# Patient Record
Sex: FEMALE | Race: WHITE | NOT HISPANIC OR LATINO | Employment: OTHER
[De-identification: names, ages, dates, MRNs, and addresses within clinical notes are randomized per-mention and may not be internally consistent; named-entity substitution may affect disease eponyms.]

---

## 2017-01-15 ENCOUNTER — HOSPITAL (OUTPATIENT)
Dept: OTHER | Age: 72
End: 2017-01-15
Attending: EMERGENCY MEDICINE

## 2017-01-19 ENCOUNTER — APPOINTMENT (OUTPATIENT)
Dept: CT IMAGING | Facility: HOSPITAL | Age: 72
DRG: 190 | End: 2017-01-19
Attending: EMERGENCY MEDICINE
Payer: MEDICARE

## 2017-01-19 ENCOUNTER — APPOINTMENT (OUTPATIENT)
Dept: GENERAL RADIOLOGY | Facility: HOSPITAL | Age: 72
DRG: 190 | End: 2017-01-19
Attending: EMERGENCY MEDICINE
Payer: MEDICARE

## 2017-01-19 ENCOUNTER — HOSPITAL ENCOUNTER (INPATIENT)
Facility: HOSPITAL | Age: 72
LOS: 2 days | Discharge: HOME | DRG: 190 | End: 2017-01-21
Attending: EMERGENCY MEDICINE | Admitting: HOSPITALIST
Payer: MEDICARE

## 2017-01-19 DIAGNOSIS — J96.01 ACUTE RESPIRATORY FAILURE WITH HYPOXIA (HCC): ICD-10-CM

## 2017-01-19 DIAGNOSIS — J44.0 ACUTE BRONCHITIS WITH COPD (HCC): Primary | ICD-10-CM

## 2017-01-19 DIAGNOSIS — J20.9 ACUTE BRONCHITIS WITH COPD (HCC): Primary | ICD-10-CM

## 2017-01-19 LAB
ALBUMIN SERPL BCP-MCNC: 3.8 G/DL (ref 3.5–4.8)
ALP SERPL-CCNC: 72 U/L (ref 32–100)
ALT SERPL-CCNC: 34 U/L (ref 14–54)
ANION GAP SERPL CALC-SCNC: 12 MMOL/L (ref 0–18)
AST SERPL-CCNC: 40 U/L (ref 15–41)
BACTERIA UR QL AUTO: NEGATIVE /HPF
BASOPHILS # BLD: 0 K/UL (ref 0–0.2)
BASOPHILS NFR BLD: 0 %
BILIRUB DIRECT SERPL-MCNC: 0.1 MG/DL (ref 0–0.2)
BILIRUB SERPL-MCNC: 0.7 MG/DL (ref 0.3–1.2)
BILIRUB UR QL: NEGATIVE
BNP SERPL-MCNC: 36 PG/ML (ref 0–100)
BUN SERPL-MCNC: 17 MG/DL (ref 8–20)
BUN/CREAT SERPL: 20.7 (ref 10–20)
CALCIUM SERPL-MCNC: 9.3 MG/DL (ref 8.5–10.5)
CHLORIDE SERPL-SCNC: 96 MMOL/L (ref 95–110)
CO2 SERPL-SCNC: 28 MMOL/L (ref 22–32)
COLOR UR: YELLOW
CREAT SERPL-MCNC: 0.82 MG/DL (ref 0.5–1.5)
D DIMER PPP FEU-MCNC: 0.76 MCG/ML
EOSINOPHIL # BLD: 0.1 K/UL (ref 0–0.7)
EOSINOPHIL NFR BLD: 1 %
ERYTHROCYTE [DISTWIDTH] IN BLOOD BY AUTOMATED COUNT: 12 % (ref 11–15)
FLUAV + FLUBV RNA SPEC NAA+PROBE: NEGATIVE
GLUCOSE SERPL-MCNC: 130 MG/DL (ref 70–99)
GLUCOSE UR-MCNC: NEGATIVE MG/DL
HCT VFR BLD AUTO: 45 % (ref 35–48)
HGB BLD-MCNC: 14.9 G/DL (ref 12–16)
INR BLD: 1 (ref 0.9–1.2)
KETONES UR-MCNC: NEGATIVE MG/DL
LACTATE SERPL-SCNC: 1.8 MMOL/L (ref 0.5–2.2)
LYMPHOCYTES # BLD: 2.5 K/UL (ref 1–4)
LYMPHOCYTES NFR BLD: 22 %
MAGNESIUM SERPL-MCNC: 2 MG/DL (ref 1.8–2.5)
MCH RBC QN AUTO: 32.9 PG (ref 27–32)
MCHC RBC AUTO-ENTMCNC: 33.2 G/DL (ref 32–37)
MCV RBC AUTO: 99.1 FL (ref 80–100)
MONOCYTES # BLD: 0.9 K/UL (ref 0–1)
MONOCYTES NFR BLD: 7 %
NEUTROPHILS # BLD AUTO: 8.3 K/UL (ref 1.8–7.7)
NEUTROPHILS NFR BLD: 70 %
NITRITE UR QL STRIP.AUTO: NEGATIVE
OSMOLALITY UR CALC.SUM OF ELEC: 285 MOSM/KG (ref 275–295)
PH UR: 5 [PH] (ref 5–8)
PLATELET # BLD AUTO: 240 K/UL (ref 140–400)
PMV BLD AUTO: 8.5 FL (ref 7.4–10.3)
POTASSIUM SERPL-SCNC: 3.7 MMOL/L (ref 3.3–5.1)
PROT SERPL-MCNC: 7 G/DL (ref 5.9–8.4)
PROT UR-MCNC: NEGATIVE MG/DL
PROTHROMBIN TIME: 12.5 SECONDS (ref 11.8–14.5)
RBC # BLD AUTO: 4.54 M/UL (ref 3.7–5.4)
RBC #/AREA URNS AUTO: 35 /HPF
SODIUM SERPL-SCNC: 136 MMOL/L (ref 136–144)
SP GR UR STRIP: 1.02 (ref 1–1.03)
TROPONIN I SERPL-MCNC: 0 NG/ML (ref ?–0.03)
UROBILINOGEN UR STRIP-ACNC: <2
VIT C UR-MCNC: NEGATIVE MG/DL
WBC # BLD AUTO: 11.7 K/UL (ref 4–11)
WBC #/AREA URNS AUTO: 55 /HPF

## 2017-01-19 PROCEDURE — 71260 CT THORAX DX C+: CPT

## 2017-01-19 PROCEDURE — 99222 1ST HOSP IP/OBS MODERATE 55: CPT | Performed by: INTERNAL MEDICINE

## 2017-01-19 PROCEDURE — 71020 XR CHEST PA + LAT CHEST (CPT=71020): CPT

## 2017-01-19 PROCEDURE — 99223 1ST HOSP IP/OBS HIGH 75: CPT | Performed by: HOSPITALIST

## 2017-01-19 RX ORDER — METHYLPREDNISOLONE SODIUM SUCCINATE 125 MG/2ML
125 INJECTION, POWDER, LYOPHILIZED, FOR SOLUTION INTRAMUSCULAR; INTRAVENOUS ONCE
Status: DISCONTINUED | OUTPATIENT
Start: 2017-01-19 | End: 2017-01-20

## 2017-01-19 RX ORDER — PREDNISONE 1 MG/1
TABLET ORAL
Status: ON HOLD | COMMUNITY
End: 2017-01-21

## 2017-01-19 RX ORDER — PREDNISONE 20 MG/1
60 TABLET ORAL ONCE
Status: COMPLETED | OUTPATIENT
Start: 2017-01-19 | End: 2017-01-19

## 2017-01-19 RX ORDER — IPRATROPIUM BROMIDE AND ALBUTEROL SULFATE 2.5; .5 MG/3ML; MG/3ML
3 SOLUTION RESPIRATORY (INHALATION) ONCE
Status: COMPLETED | OUTPATIENT
Start: 2017-01-19 | End: 2017-01-19

## 2017-01-19 NOTE — ED NOTES
Pt resting on cart. Pt with coarse breath sounds throughout. Pt states she has been coughing x 1 week, but increased in the last 2 days. Fever started this morning. Resp Therapy at bedside. Neb tx in progress.

## 2017-01-19 NOTE — ED NOTES
Pt up to the restroom to void, back to bed without assist. Pt noted to have expiratory wheezing heard without auscultation. Checked patient's room air saturation 86-87%, . Pt placed back on oxygen 2 lpm via NC. Sats now up to 95%.  MD Lesley Burris made a

## 2017-01-19 NOTE — ED NOTES
Pt refused IV solumedrol, sts \"when I take that it makes me sick I do better with oral prednisone\" Dr. Mejia Larsen made aware orders for prednisone received.

## 2017-01-19 NOTE — H&P
[facility]    PATIENT'S NAME: Apple Esparza   ATTENDING PHYSICIAN: Alexa Briggs MD   PATIENT ACCOUNT#:   [de-identified]    LOCATION:  [place] [address]  MEDICAL RECORD #:   [id number]       YOB: 1945  ADMISSION DATE:       01/19/20 wheezing and shortness of breath for the last few days. Symptoms started with cough, generalized body aches, and low-grade fever. Her  had similar symptoms. The patient stated the cough is nonproductive of phlegm. No abdominal pain.   No dysuria

## 2017-01-19 NOTE — ED NOTES
Explained IV CT process to patient. Pt states understanding, denies any history of allergy to contrast dye. IVF infusing at this time.

## 2017-01-19 NOTE — ED PROVIDER NOTES
Patient Seen in: [facility] AND [facility] Emergency Department    History   Patient presents with:  Fever Sepsis (infectious)    Stated Complaint: SOB FEVER    HPI    65-year-old female with past medical history of smoking and cervical radiculopathy and stenosi reviewed. All other systems reviewed and negative except as noted above. PSFH elements reviewed from today and agreed except as otherwise stated in HPI.     Physical Exam       ED Triage Vitals   BP 01/19/17 1331 161/77 mmHg   Pulse 01/19/17 1331 83 following:     Clarity Urine Hazy (*)     Blood Urine Moderate (*)     Leukocyte Esterase Urine Large (*)     WBC Urine 55 (*)     RBC URINE 35 (*)     All other components within normal limits   CBC W/ DIFFERENTIAL - Abnormal; Notable for the following: treatment. Imaging:   Xr Chest Pa + Lat Chest (cpt=71020)    1/19/2017  PROCEDURE: XR CHEST PA + LAT CHEST (CPT=71020)  COMPARISON: None. INDICATIONS: Worsening shortness of breath, cough x2 days. Fever today. TECHNIQUE:   Two views.    FINDINGS: CAR to artifactual degradation. The main pulmonary artery trunk is normal in caliber, measuring 2.5 cm. CARDIAC: The heart is not enlarged. There is no bowing of the interventricular septum to suggest right ventricular strain.  Aortic annular calcifications are MD on 1/19/2017 at 16:36          1/19/2017  CONCLUSION:  1. No evidence of acute pulmonary embolism to the level of the segmental pulmonary artery branches.   2. Scattered peripheral nodular opacities, likely representing small airways disease, possibly wi

## 2017-01-20 PROBLEM — R09.02 HYPOXEMIA: Status: ACTIVE | Noted: 2017-01-20

## 2017-01-20 LAB
ANION GAP SERPL CALC-SCNC: 8 MMOL/L (ref 0–18)
BASOPHILS # BLD: 0 K/UL (ref 0–0.2)
BASOPHILS NFR BLD: 0 %
BUN SERPL-MCNC: 13 MG/DL (ref 8–20)
BUN/CREAT SERPL: 21 (ref 10–20)
CALCIUM SERPL-MCNC: 8.4 MG/DL (ref 8.5–10.5)
CHLORIDE SERPL-SCNC: 98 MMOL/L (ref 95–110)
CO2 SERPL-SCNC: 28 MMOL/L (ref 22–32)
CREAT SERPL-MCNC: 0.62 MG/DL (ref 0.5–1.5)
EOSINOPHIL # BLD: 0 K/UL (ref 0–0.7)
EOSINOPHIL NFR BLD: 0 %
ERYTHROCYTE [DISTWIDTH] IN BLOOD BY AUTOMATED COUNT: 11.9 % (ref 11–15)
GLUCOSE SERPL-MCNC: 82 MG/DL (ref 70–99)
HCT VFR BLD AUTO: 38.1 % (ref 35–48)
HGB BLD-MCNC: 12.9 G/DL (ref 12–16)
LYMPHOCYTES # BLD: 2.2 K/UL (ref 1–4)
LYMPHOCYTES NFR BLD: 19 %
MCH RBC QN AUTO: 33.2 PG (ref 27–32)
MCHC RBC AUTO-ENTMCNC: 34 G/DL (ref 32–37)
MCV RBC AUTO: 97.7 FL (ref 80–100)
MONOCYTES # BLD: 1.1 K/UL (ref 0–1)
MONOCYTES NFR BLD: 9 %
NEUTROPHILS # BLD AUTO: 8.2 K/UL (ref 1.8–7.7)
NEUTROPHILS NFR BLD: 71 %
OSMOLALITY UR CALC.SUM OF ELEC: 277 MOSM/KG (ref 275–295)
PLATELET # BLD AUTO: 213 K/UL (ref 140–400)
PMV BLD AUTO: 8.5 FL (ref 7.4–10.3)
POTASSIUM SERPL-SCNC: 4 MMOL/L (ref 3.3–5.1)
RBC # BLD AUTO: 3.9 M/UL (ref 3.7–5.4)
SODIUM SERPL-SCNC: 134 MMOL/L (ref 136–144)
WBC # BLD AUTO: 11.4 K/UL (ref 4–11)

## 2017-01-20 PROCEDURE — 99233 SBSQ HOSP IP/OBS HIGH 50: CPT | Performed by: HOSPITALIST

## 2017-01-20 PROCEDURE — 99232 SBSQ HOSP IP/OBS MODERATE 35: CPT | Performed by: INTERNAL MEDICINE

## 2017-01-20 RX ORDER — METHYLPREDNISOLONE SODIUM SUCCINATE 40 MG/ML
40 INJECTION, POWDER, LYOPHILIZED, FOR SOLUTION INTRAMUSCULAR; INTRAVENOUS EVERY 6 HOURS
Status: DISCONTINUED | OUTPATIENT
Start: 2017-01-20 | End: 2017-01-20

## 2017-01-20 RX ORDER — IPRATROPIUM BROMIDE AND ALBUTEROL SULFATE 2.5; .5 MG/3ML; MG/3ML
3 SOLUTION RESPIRATORY (INHALATION)
Status: DISCONTINUED | OUTPATIENT
Start: 2017-01-20 | End: 2017-01-21

## 2017-01-20 RX ORDER — NICOTINE 21 MG/24HR
1 PATCH, TRANSDERMAL 24 HOURS TRANSDERMAL DAILY PRN
Status: DISCONTINUED | OUTPATIENT
Start: 2017-01-20 | End: 2017-01-21

## 2017-01-20 RX ORDER — DOCUSATE SODIUM 100 MG/1
100 CAPSULE, LIQUID FILLED ORAL 2 TIMES DAILY
Status: DISCONTINUED | OUTPATIENT
Start: 2017-01-20 | End: 2017-01-21

## 2017-01-20 RX ORDER — ACETAMINOPHEN 325 MG/1
650 TABLET ORAL EVERY 6 HOURS PRN
Status: DISCONTINUED | OUTPATIENT
Start: 2017-01-20 | End: 2017-01-21

## 2017-01-20 RX ORDER — SODIUM CHLORIDE 9 MG/ML
INJECTION, SOLUTION INTRAVENOUS CONTINUOUS
Status: DISCONTINUED | OUTPATIENT
Start: 2017-01-20 | End: 2017-01-20

## 2017-01-20 RX ORDER — PREDNISONE 20 MG/1
60 TABLET ORAL
Status: DISCONTINUED | OUTPATIENT
Start: 2017-01-21 | End: 2017-01-21

## 2017-01-20 RX ORDER — GUAIFENESIN 100 MG/5ML
100 SOLUTION ORAL EVERY 4 HOURS PRN
Status: DISCONTINUED | OUTPATIENT
Start: 2017-01-20 | End: 2017-01-21

## 2017-01-20 RX ORDER — ENOXAPARIN SODIUM 100 MG/ML
40 INJECTION SUBCUTANEOUS DAILY
Status: DISCONTINUED | OUTPATIENT
Start: 2017-01-20 | End: 2017-01-21

## 2017-01-20 RX ORDER — ONDANSETRON 2 MG/ML
4 INJECTION INTRAMUSCULAR; INTRAVENOUS EVERY 6 HOURS PRN
Status: DISCONTINUED | OUTPATIENT
Start: 2017-01-20 | End: 2017-01-21

## 2017-01-20 RX ORDER — SODIUM PHOSPHATE, DIBASIC AND SODIUM PHOSPHATE, MONOBASIC 7; 19 G/133ML; G/133ML
1 ENEMA RECTAL ONCE AS NEEDED
Status: ACTIVE | OUTPATIENT
Start: 2017-01-20 | End: 2017-01-20

## 2017-01-20 RX ORDER — POLYETHYLENE GLYCOL 3350 17 G/17G
17 POWDER, FOR SOLUTION ORAL DAILY PRN
Status: DISCONTINUED | OUTPATIENT
Start: 2017-01-20 | End: 2017-01-21

## 2017-01-20 RX ORDER — IPRATROPIUM BROMIDE AND ALBUTEROL SULFATE 2.5; .5 MG/3ML; MG/3ML
3 SOLUTION RESPIRATORY (INHALATION) EVERY 4 HOURS PRN
Status: DISCONTINUED | OUTPATIENT
Start: 2017-01-20 | End: 2017-01-21

## 2017-01-20 RX ORDER — PREDNISONE 20 MG/1
40 TABLET ORAL
Status: DISCONTINUED | OUTPATIENT
Start: 2017-01-20 | End: 2017-01-20

## 2017-01-20 RX ORDER — PREDNISONE 20 MG/1
40 TABLET ORAL
Status: DISCONTINUED | OUTPATIENT
Start: 2017-01-21 | End: 2017-01-20

## 2017-01-20 RX ORDER — BISACODYL 10 MG
10 SUPPOSITORY, RECTAL RECTAL
Status: DISCONTINUED | OUTPATIENT
Start: 2017-01-20 | End: 2017-01-21

## 2017-01-20 NOTE — PLAN OF CARE
Vss, assessment wnl. Will continue iv abx, ivf. Pt currently refusing iv steriods, but requesting oral steroids. Will continue to monitor pt closely for changes.   Smoking cessation hand out as given,

## 2017-01-20 NOTE — PROGRESS NOTES
[facility]     Progress Note        Algernon Dance Patient Status:  Inpatient    1945 MRN [id number]   Location [facility] 3W/SW Attending Joseph Chand MD   [facility] Day # 1 PCP Luiza Cote MD       Subjective • sodium chloride 100 mL/hr at 01/20/17 8154       Physical Exam  Constitutional: no acute distress, alert, oriented  HEENT: PERRL, EOMI, nares patents, no nasal polyps   Cardio: RRR, S1 S2  Respiratory: bilateral expiratory wheeze  GI: abdomen soft, non material. Automated exposure control for dose reduction was used. Adjustment of the mA and/or kV was done based on the patient's size. Iterative reconstruction technique for dose reduction was employed.  Multiplanar reformats and maximum intensity projectio LIMITED ABDOMEN: Within the parameters of the arterial phase of contrast-enhancement, the included upper abdomen is notable for the presence of a splenule in the left upper quadrant.   BONES: And angulated levoscoliotic curvature of the lumbar spine is inco steroids.  -Nebulizer treatments  -Continue empiric ceftriaxone and azithromycin  -Activity as tolerated.  Assess for home oxygen prior to [facility] [place] in 1-2 days    Curly Riedel, [address] [facility]

## 2017-01-20 NOTE — CONSULTS
[facility] HOSP - [facility]    Report of Consultation    José Miguelrussell Childss Patient Status:  Emergency    1945 MRN [id number]   Location [address] Attending Coy Zavala MD   Hosp Day # 0 PCP Luiza Cote, syncope, weakness, dizziness,   Psychiatric: denies depression, anxiety  Hematologic: denies bruising        Physical Exam:   Blood pressure 137/68, pulse 80, temperature 98.4 °F (36.9 °C), temperature source Temporal, resp.  rate 18, height 4' 11\" (1.499 cardiomegaly. Tortuous aorta. 2. Mild diffuse reticulonodular interstitial pattern with bibasilar predominance.  Suspect mild interstitial pneumonitis         Ct Chest Pain/pe (iv Only) Em    1/19/2017  PROCEDURE: CT OF THE CHEST W CONTRAST PAIN/PE PROTOCOL subsegmental atelectasis bilaterally. No airspace consolidation, pleural effusion, or pneumothorax is detected. AIRWAYS: Mild, diffuse borderline bronchiectasis is suggested. Subtle areas of bronchial wall thickening may reflect bronchitis.  The tracheobro function testing. -CT chest without evidence of acute pulmonary embolism or significant consolidation seen.   -Influenza panel negative.  -Steroids and will wean gradually  -Nebulizer treatments  -Continue empiric ceftriaxone and azithromycin  -Encourage

## 2017-01-21 VITALS
SYSTOLIC BLOOD PRESSURE: 131 MMHG | RESPIRATION RATE: 22 BRPM | OXYGEN SATURATION: 89 % | HEIGHT: 59 IN | DIASTOLIC BLOOD PRESSURE: 61 MMHG | TEMPERATURE: 98 F | BODY MASS INDEX: 22.18 KG/M2 | HEART RATE: 95 BPM | WEIGHT: 110 LBS

## 2017-01-21 LAB
ANION GAP SERPL CALC-SCNC: 8 MMOL/L (ref 0–18)
BUN SERPL-MCNC: 13 MG/DL (ref 8–20)
BUN/CREAT SERPL: 20 (ref 10–20)
CALCIUM SERPL-MCNC: 8.7 MG/DL (ref 8.5–10.5)
CHLORIDE SERPL-SCNC: 100 MMOL/L (ref 95–110)
CO2 SERPL-SCNC: 29 MMOL/L (ref 22–32)
CREAT SERPL-MCNC: 0.65 MG/DL (ref 0.5–1.5)
ERYTHROCYTE [DISTWIDTH] IN BLOOD BY AUTOMATED COUNT: 12.1 % (ref 11–15)
GLUCOSE SERPL-MCNC: 94 MG/DL (ref 70–99)
HCT VFR BLD AUTO: 37.9 % (ref 35–48)
HGB BLD-MCNC: 13 G/DL (ref 12–16)
MCH RBC QN AUTO: 33.4 PG (ref 27–32)
MCHC RBC AUTO-ENTMCNC: 34.2 G/DL (ref 32–37)
MCV RBC AUTO: 97.7 FL (ref 80–100)
OSMOLALITY UR CALC.SUM OF ELEC: 284 MOSM/KG (ref 275–295)
PLATELET # BLD AUTO: 191 K/UL (ref 140–400)
PMV BLD AUTO: 8.5 FL (ref 7.4–10.3)
POTASSIUM SERPL-SCNC: 3.9 MMOL/L (ref 3.3–5.1)
RBC # BLD AUTO: 3.88 M/UL (ref 3.7–5.4)
SODIUM SERPL-SCNC: 137 MMOL/L (ref 136–144)
WBC # BLD AUTO: 10 K/UL (ref 4–11)

## 2017-01-21 PROCEDURE — 99239 HOSP IP/OBS DSCHRG MGMT >30: CPT | Performed by: HOSPITALIST

## 2017-01-21 PROCEDURE — 99232 SBSQ HOSP IP/OBS MODERATE 35: CPT | Performed by: INTERNAL MEDICINE

## 2017-01-21 RX ORDER — LEVOFLOXACIN 500 MG/1
500 TABLET, FILM COATED ORAL DAILY
Qty: 5 TABLET | Refills: 0 | Status: SHIPPED | OUTPATIENT
Start: 2017-01-21 | End: 2017-01-26

## 2017-01-21 RX ORDER — PREDNISONE 10 MG/1
10 TABLET ORAL DAILY
Qty: 45 TABLET | Refills: 0 | Status: ON HOLD | OUTPATIENT
Start: 2017-01-21 | End: 2017-12-25 | Stop reason: ALTCHOICE

## 2017-01-21 RX ORDER — NICOTINE 21 MG/24HR
1 PATCH, TRANSDERMAL 24 HOURS TRANSDERMAL EVERY 24 HOURS
Qty: 30 PATCH | Refills: 0 | Status: SHIPPED | OUTPATIENT
Start: 2017-01-21 | End: 2017-02-20

## 2017-01-21 NOTE — PLAN OF CARE
Patient/Family Goals    • Patient/Family Long Term Goal Progressing    • Patient/Family Short Term Goal Progressing        RESPIRATORY - ADULT    • Achieves optimal ventilation and oxygenation Progressing        Iv antibiotics and po steroids continue.  Oxy

## 2017-01-21 NOTE — PROGRESS NOTES
[facility]     Progress Note        Genna Lopez Patient Status:  Inpatient    1945 MRN [id number]   Location [facility] 3W/SW Attending Claudette Harsh, MD   [address] Day # 2 PCP Luiza Cote MD       Subjective Infusions:        Physical Exam  Constitutional: no acute distress, alert, oriented  HEENT: PERRL, EOMI, nares patents, no nasal polyps   Cardio: RRR, S1 S2  Respiratory: diminished breath sounds  GI: abdomen soft, non tender  Extremities: no clubbing, cya for dose reduction was used. Adjustment of the mA and/or kV was done based on the patient's size. Iterative reconstruction technique for dose reduction was employed. Multiplanar reformats and maximum intensity projection images were created.    FINDINGS: CO the arterial phase of contrast-enhancement, the included upper abdomen is notable for the presence of a splenule in the left upper quadrant. BONES: And angulated levoscoliotic curvature of the lumbar spine is incompletely delineated.  There is right convex azithromycin  -Activity as tolerated. Assess for home oxygen today  -If tolerating ambulation possible discharge later today with tapering course of Prednisone therapy.     Lori Hogan, DO  Pulmonary [address] [facility]

## 2017-01-21 NOTE — PROGRESS NOTES
[facility] HOSPITALIST  Progress Note     Carmelita Massed Patient Status:  Inpatient    1945 MRN [id number]   Location [facility] 3W/SW Attending Adrianna Tiwari MD   [facility] Day # 1 PCP Luiza Cote MD     Chief Complaint: SOB Em    1/19/2017  CONCLUSION:  1. No evidence of acute pulmonary embolism to the level of the segmental pulmonary artery branches. 2. Scattered peripheral nodular opacities, likely representing small airways disease, possibly with an element of bronchitis. clinical documentation in H+P. Based on patients current state of illness, I anticipate that, after discharge, patient will require TBD.

## 2017-01-21 NOTE — DISCHARGE SUMMARY
[place] HOSPITALIST  DISCHARGE SUMMARY     Michi Wilber Campbellmary Patient Status:  Inpatient    1945 MRN [id number]   Location [facility] 3W/SW Attending Balwinder Stevenson MD   Hosp Day # 2 PCP Luiza Cote MD     Date of Admission: 1 for RAD.    - o2 as needed.    - Resp panel negative. CXR and CT chest no consolidation     Probable COPD  - Long standing h/o smoking since age 20 1PPD.    - Needs PFT's outpt in 3-6 weeks.    - Pulm following.    - cont duonebs [id number] Wa.  fLOVENT  - o2 nurse     Bring a paper prescription for each of these medications    - ipratropium-albuterol  MCG/ACT Aero  - levofloxacin 500 MG Tabs  - predniSONE 10 MG Tabs          Discharge Condition: Medically Stable for discharge. Discharge Diet: .  G 1/21/2017    Time spent:  > 35 minutes

## 2017-01-21 NOTE — PLAN OF CARE
Patient ambulated 150ft on room air. Lowest oxygen saturation is 89% and highest o2 saturation is 93%. Patient did not experience shortness of breath with exertion. At rest, o2 saturation is 92% on room air.   Dr. Sly Ortiz made aware of oxygen saturation

## 2017-01-23 ENCOUNTER — TELEPHONE (OUTPATIENT)
Dept: MEDSURG UNIT | Facility: HOSPITAL | Age: 72
End: 2017-01-23

## 2017-01-26 ENCOUNTER — CHARTING TRANS (OUTPATIENT)
Dept: OTHER | Age: 72
End: 2017-01-26

## 2017-02-14 ENCOUNTER — CHARTING TRANS (OUTPATIENT)
Dept: OTHER | Age: 72
End: 2017-02-14

## 2017-12-25 ENCOUNTER — HOSPITAL ENCOUNTER (INPATIENT)
Facility: HOSPITAL | Age: 72
LOS: 2 days | Discharge: HOME | DRG: 392 | End: 2017-12-27
Attending: EMERGENCY MEDICINE | Admitting: INTERNAL MEDICINE
Payer: MEDICARE

## 2017-12-25 ENCOUNTER — APPOINTMENT (OUTPATIENT)
Dept: GENERAL RADIOLOGY | Facility: HOSPITAL | Age: 72
DRG: 392 | End: 2017-12-25
Attending: EMERGENCY MEDICINE
Payer: MEDICARE

## 2017-12-25 DIAGNOSIS — R53.1 WEAKNESS GENERALIZED: Primary | ICD-10-CM

## 2017-12-25 DIAGNOSIS — R13.10 DYSPHAGIA, UNSPECIFIED TYPE: ICD-10-CM

## 2017-12-25 PROCEDURE — 71020 XR CHEST PA + LAT CHEST (CPT=71020): CPT | Performed by: EMERGENCY MEDICINE

## 2017-12-25 PROCEDURE — 80076 HEPATIC FUNCTION PANEL: CPT | Performed by: EMERGENCY MEDICINE

## 2017-12-25 PROCEDURE — 81001 URINALYSIS AUTO W/SCOPE: CPT | Performed by: EMERGENCY MEDICINE

## 2017-12-25 PROCEDURE — 85025 COMPLETE CBC W/AUTO DIFF WBC: CPT | Performed by: EMERGENCY MEDICINE

## 2017-12-25 PROCEDURE — 36415 COLL VENOUS BLD VENIPUNCTURE: CPT

## 2017-12-25 PROCEDURE — 80048 BASIC METABOLIC PNL TOTAL CA: CPT | Performed by: EMERGENCY MEDICINE

## 2017-12-25 PROCEDURE — 99285 EMERGENCY DEPT VISIT HI MDM: CPT

## 2017-12-25 RX ORDER — ENOXAPARIN SODIUM 100 MG/ML
40 INJECTION SUBCUTANEOUS DAILY
Status: DISCONTINUED | OUTPATIENT
Start: 2017-12-25 | End: 2017-12-27

## 2017-12-25 RX ORDER — DEXTROAMPHETAMINE SACCHARATE, AMPHETAMINE ASPARTATE, DEXTROAMPHETAMINE SULFATE AND AMPHETAMINE SULFATE 1.25; 1.25; 1.25; 1.25 MG/1; MG/1; MG/1; MG/1
5 TABLET ORAL 2 TIMES DAILY
COMMUNITY
End: 2018-03-10

## 2017-12-25 RX ORDER — SODIUM CHLORIDE 0.9 % (FLUSH) 0.9 %
3 SYRINGE (ML) INJECTION AS NEEDED
Status: DISCONTINUED | OUTPATIENT
Start: 2017-12-25 | End: 2017-12-27

## 2017-12-25 RX ORDER — DIAZEPAM 5 MG/1
5 TABLET ORAL EVERY 6 HOURS PRN
Status: DISCONTINUED | OUTPATIENT
Start: 2017-12-25 | End: 2017-12-27

## 2017-12-25 RX ORDER — DEXTROAMPHETAMINE SACCHARATE, AMPHETAMINE ASPARTATE, DEXTROAMPHETAMINE SULFATE AND AMPHETAMINE SULFATE 1.25; 1.25; 1.25; 1.25 MG/1; MG/1; MG/1; MG/1
5 TABLET ORAL 2 TIMES DAILY
Status: DISCONTINUED | OUTPATIENT
Start: 2017-12-25 | End: 2017-12-27

## 2017-12-25 RX ORDER — DEXTROSE AND SODIUM CHLORIDE 5; .45 G/100ML; G/100ML
INJECTION, SOLUTION INTRAVENOUS CONTINUOUS
Status: DISCONTINUED | OUTPATIENT
Start: 2017-12-25 | End: 2017-12-27

## 2017-12-25 RX ORDER — HYDROCODONE BITARTRATE AND ACETAMINOPHEN 5; 325 MG/1; MG/1
1 TABLET ORAL EVERY 6 HOURS PRN
COMMUNITY
End: 2018-03-10

## 2017-12-25 RX ORDER — HYDROCODONE BITARTRATE AND ACETAMINOPHEN 5; 325 MG/1; MG/1
1 TABLET ORAL EVERY 6 HOURS PRN
Status: DISCONTINUED | OUTPATIENT
Start: 2017-12-25 | End: 2017-12-27

## 2017-12-25 RX ORDER — DIAZEPAM 5 MG/1
5 TABLET ORAL EVERY 6 HOURS PRN
COMMUNITY

## 2017-12-25 NOTE — ED INITIAL ASSESSMENT (HPI)
Pt brought in by EMS with difficulty swallowing. Pt has been dealing with this issue for the last few months but feels it has worsened over the last week. PT states she is very hungry. No difficulty breathing. Speaking in full sentences.

## 2017-12-25 NOTE — CONSULTS
[facility] HOSP - [facility]    Report of Consultation    Batsheva Seo Patient Status:  Inpatient    1945 MRN [id number]   [address] [place] [address] Drive 5SW/SE Attending Marty Bragg MD   Hosp Day # 0 PCP Luiza Cote MD     Date infusion  Intravenous Continuous       Prescriptions Prior to Admission:  diazepam 5 MG Oral Tab Take 5 mg by mouth every 6 (six) hours as needed for Anxiety. amphetamine-dextroamphetamine (ADDERALL) 5 MG Oral Tab Take 5 mg by mouth 2 (two) times daily. unspecified type  Suspect mechanical due to surgery and possibly subsequent scaring. Rule out stricture, neoplasm, candidiasis.       Recommendations:  EGD will be done  If negative for other than general hypopharyngeal narrowing, advise speech therapy (wit

## 2017-12-25 NOTE — H&P
[address] Patient Status:  Inpatient    1945 MRN [id number]   Location [facility] 5SW/SE Attending Grabiel Mera MD   Hosp Day # 0 PCP Luiza Cote MD     Date:   DISK,ONE LEVEL  No family history on file.   Social History:  Smoking status: Former Smoker                                                              Packs/day: 0.00      Years: 0.00      Smokeless tobacco: Never Used                        Allergies/Med throat is clear, JORGE, EOMI, clear conjunctiva   NECK: No masses, symmetric, no adenopathy   LUNGS: CTAB, no wrr   CV: RRR without murmur   ABD: Soft, nontender and not distended   EXTREMITIES: no cyanosis, clubbing or edema   NEURO: Strength intact; no se

## 2017-12-25 NOTE — ED PROVIDER NOTES
Patient Seen in: [facility] AND [facility] Emergency Department    History   Patient presents with:  Swallowing Problem (gastrointestinal)    Stated Complaint: difficulty swallowing    HPI    71-year-old female patient presents complaining of generalized weaknes Normal respiratory effort, clear lungs  Abdomen: Soft,  nondistended, non tender  : No CVA tenderness  Skin: No rash, no lesions  Musculoskeletal: Symmetric, no deformity, no injuries  Neuro: Normal speech, nonfocal examination.   Symmetric motor, sensory List

## 2017-12-26 ENCOUNTER — ANESTHESIA (OUTPATIENT)
Dept: ENDOSCOPY | Facility: HOSPITAL | Age: 72
DRG: 392 | End: 2017-12-26
Payer: MEDICARE

## 2017-12-26 ENCOUNTER — ANESTHESIA EVENT (OUTPATIENT)
Dept: ENDOSCOPY | Facility: HOSPITAL | Age: 72
DRG: 392 | End: 2017-12-26
Payer: MEDICARE

## 2017-12-26 ENCOUNTER — SURGERY (OUTPATIENT)
Age: 72
End: 2017-12-26

## 2017-12-26 ENCOUNTER — APPOINTMENT (OUTPATIENT)
Dept: CT IMAGING | Facility: HOSPITAL | Age: 72
DRG: 392 | End: 2017-12-26
Attending: INTERNAL MEDICINE
Payer: MEDICARE

## 2017-12-26 PROBLEM — R63.4 WEIGHT LOSS: Status: ACTIVE | Noted: 2017-12-26

## 2017-12-26 PROCEDURE — 0DB28ZX EXCISION OF MIDDLE ESOPHAGUS, VIA NATURAL OR ARTIFICIAL OPENING ENDOSCOPIC, DIAGNOSTIC: ICD-10-PCS | Performed by: INTERNAL MEDICINE

## 2017-12-26 PROCEDURE — 80048 BASIC METABOLIC PNL TOTAL CA: CPT | Performed by: INTERNAL MEDICINE

## 2017-12-26 PROCEDURE — 88312 SPECIAL STAINS GROUP 1: CPT | Performed by: INTERNAL MEDICINE

## 2017-12-26 PROCEDURE — 70491 CT SOFT TISSUE NECK W/DYE: CPT | Performed by: INTERNAL MEDICINE

## 2017-12-26 PROCEDURE — 85652 RBC SED RATE AUTOMATED: CPT | Performed by: INTERNAL MEDICINE

## 2017-12-26 PROCEDURE — 0DB38ZX EXCISION OF LOWER ESOPHAGUS, VIA NATURAL OR ARTIFICIAL OPENING ENDOSCOPIC, DIAGNOSTIC: ICD-10-PCS | Performed by: INTERNAL MEDICINE

## 2017-12-26 PROCEDURE — 88305 TISSUE EXAM BY PATHOLOGIST: CPT | Performed by: INTERNAL MEDICINE

## 2017-12-26 PROCEDURE — 84443 ASSAY THYROID STIM HORMONE: CPT | Performed by: INTERNAL MEDICINE

## 2017-12-26 RX ORDER — LIDOCAINE HYDROCHLORIDE 10 MG/ML
INJECTION, SOLUTION EPIDURAL; INFILTRATION; INTRACAUDAL; PERINEURAL AS NEEDED
Status: DISCONTINUED | OUTPATIENT
Start: 2017-12-26 | End: 2017-12-26 | Stop reason: SURG

## 2017-12-26 RX ORDER — SENNA AND DOCUSATE SODIUM 50; 8.6 MG/1; MG/1
2 TABLET, FILM COATED ORAL DAILY
Status: DISCONTINUED | OUTPATIENT
Start: 2017-12-26 | End: 2017-12-27

## 2017-12-26 RX ORDER — 0.9 % SODIUM CHLORIDE 0.9 %
VIAL (ML) INJECTION
Status: COMPLETED
Start: 2017-12-26 | End: 2017-12-26

## 2017-12-26 RX ORDER — SODIUM CHLORIDE, SODIUM LACTATE, POTASSIUM CHLORIDE, CALCIUM CHLORIDE 600; 310; 30; 20 MG/100ML; MG/100ML; MG/100ML; MG/100ML
INJECTION, SOLUTION INTRAVENOUS CONTINUOUS
Status: DISCONTINUED | OUTPATIENT
Start: 2017-12-26 | End: 2017-12-26

## 2017-12-26 RX ORDER — POTASSIUM CHLORIDE 20 MEQ/1
40 TABLET, EXTENDED RELEASE ORAL EVERY 4 HOURS
Status: DISPENSED | OUTPATIENT
Start: 2017-12-26 | End: 2017-12-26

## 2017-12-26 RX ORDER — SODIUM CHLORIDE 9 MG/ML
INJECTION, SOLUTION INTRAVENOUS CONTINUOUS PRN
Status: DISCONTINUED | OUTPATIENT
Start: 2017-12-26 | End: 2017-12-26 | Stop reason: SURG

## 2017-12-26 RX ORDER — NALOXONE HYDROCHLORIDE 0.4 MG/ML
80 INJECTION, SOLUTION INTRAMUSCULAR; INTRAVENOUS; SUBCUTANEOUS AS NEEDED
Status: DISCONTINUED | OUTPATIENT
Start: 2017-12-26 | End: 2017-12-26 | Stop reason: HOSPADM

## 2017-12-26 RX ADMIN — SODIUM CHLORIDE: 9 INJECTION, SOLUTION INTRAVENOUS at 12:27:00

## 2017-12-26 RX ADMIN — SODIUM CHLORIDE: 9 INJECTION, SOLUTION INTRAVENOUS at 12:55:00

## 2017-12-26 RX ADMIN — LIDOCAINE HYDROCHLORIDE 40 MG: 10 INJECTION, SOLUTION EPIDURAL; INFILTRATION; INTRACAUDAL; PERINEURAL at 12:39:00

## 2017-12-26 NOTE — H&P
PRE-PROCEDURE UPDATE    HPI: David Emanuel is a 67year old female. 9/25/1945. Patient presents for an Esophagogastroduodenoscopy. Has had dysphagia with wt loss since cervical surgery earlier this year. EGD to evaluate.     ALLERGIES:   Aspirin

## 2017-12-26 NOTE — PROGRESS NOTES
[facility]    Progress Note     Patient Status:  Inpatient    1945 MRN [id number]   Location [facility] 5SW/SE Attending Nigel Sawyer MD   [facility] Day # 1 PCP Luiza Cote MD     Subjective:

## 2017-12-26 NOTE — ANESTHESIA PREPROCEDURE EVALUATION
Anesthesia PreOp Note    HPI:     Eliot Marino is a 67year old female who presents for preoperative consultation requested by: April Castorena MD    Date of Surgery: 12/25/2017 - 12/26/2017    Procedure(s):  ESOPHAGOGASTRODUODENOSCOPY (EGD)  Indication: diazepam (VALIUM) tab 5 mg 5 mg Oral Q6H PRN Elsa Abraham MD 5 mg at 12/25/17 1819     No current [facility]-ordered outpatient prescriptions on file. Aspirin                 Itching    History reviewed. No pertinent family history.     Social Histo distance: >3 FB  Neck ROM: limited  Dental - normal exam     Pulmonary - normal exam    breath sounds clear to auscultation    ROS comment: Hx of acute bronchitis, no current lung issues    Cardiovascular - negative ROS and normal exam  Exercise tolerance:

## 2017-12-26 NOTE — PROCEDURES
ESOPHAGOGASTRODUODENOSCOPY REPORT    Patient Name:  Clari Daniel Record #: [id number]  YOB: 1945  Date of Procedure: 12/26/2017    Referring physician: Luiza Cote MD    Surgeon:  Thien Briscoe MD    Pre-op diag

## 2017-12-26 NOTE — ANESTHESIA POSTPROCEDURE EVALUATION
Patient: David Emanuel    Procedure Summary     Date:  12/26/17 Room / Location:  [address] [place] [address] ENDOSCOPY 01 / [address] [place] [address] ENDOSCOPY    Anesthesia Start:  5698 Anesthesia Stop:      Procedure:  ESOPHAGOGASTRODUODENOSCOPY (EGD) (N/A ) Diagnosis:       Dysphagia, unspecified

## 2017-12-27 VITALS
RESPIRATION RATE: 18 BRPM | BODY MASS INDEX: 18.85 KG/M2 | SYSTOLIC BLOOD PRESSURE: 135 MMHG | OXYGEN SATURATION: 95 % | DIASTOLIC BLOOD PRESSURE: 67 MMHG | TEMPERATURE: 98 F | HEIGHT: 59 IN | HEART RATE: 73 BPM | WEIGHT: 93.5 LBS

## 2017-12-27 PROCEDURE — 92610 EVALUATE SWALLOWING FUNCTION: CPT

## 2017-12-27 PROCEDURE — 84132 ASSAY OF SERUM POTASSIUM: CPT | Performed by: INTERNAL MEDICINE

## 2017-12-27 NOTE — DISCHARGE SUMMARY
[facility]    Discharge Summary    Missy Khanjen Patient Status:  Inpatient    1945 MRN [id number]   Location [facility] 5SW/SE Attending Britt Linares MD   Hosp Day # 2 PCP Luiza Cote MD     Date of daily. Refills:  0     diazepam 5 MG Tabs  Commonly known as:  VALIUM      Take 5 mg by mouth every 6 (six) hours as needed for Anxiety.    Refills:  0     HYDROcodone-acetaminophen 5-325 MG Tabs  Commonly known as:  NORCO      Take 1 tablet by mouth ever

## 2017-12-27 NOTE — PROGRESS NOTES
[facility]  Gastroenterology Progress Note    Missy Mace Patient Status:  Inpatient    1945 MRN [id number]   [facility] 5SW/SE Attending Britt Linares MD   Hosp Day # 2 PCP Luiza Cote MD     Subject plate s/p surgery in this area. EGD noted patent lumen. Patient does tolerate a soft diet.  Biopsies of esophagus unremarkable.    -Consult to speech and nutrition to maximize caloric intake, dietary restrictions etc.  -Can consider neurosurgery re-evaluati

## 2017-12-27 NOTE — PROGRESS NOTES
[facility]    Progress Note    Camron Kato Patient Status:  Inpatient    1945 MRN [id number]   Location [facility] 5SW/SE Attending Lyndsay Araujo MD   [address] Day # 2 PCP Luiza Cote MD     Subjective: from cervical fusion plate onto esophagus. However the lumen is opened. Therefore will have dietary and speech therapy see patient to help with the dietary choices. Patient is able to tolerate pure re-food well.   Hopefully over time symptoms will improve

## 2017-12-27 NOTE — SLP NOTE
ADULT SWALLOWING EVALUATION    ASSESSMENT    ASSESSMENT/OVERALL IMPRESSION:  Pt seen for bedside swallow evaluation secondary to a complaint of dysphagia post spinal fusion.   The pt reports she has a globus sensation with solids sticking in her lower throa Aspiration precautions  Discharge Recommendations/Plan: Home    HISTORY   MEDICAL HISTORY  Reason for Referral: R/O aspiration    Problem List  Principal Problem:    Weakness generalized  Active Problems:    Dysphagia, unspecified type    Weight loss aspiration.)    Esophageal Phase of Swallow: Complaints consistent with possible esophageal involvement                GOALS  Goal #1 The patient will tolerate soft solids consistency and thin liquids without overt signs or symptoms of aspiration with 100

## 2017-12-27 NOTE — CM/SW NOTE
Met with patient at bedside to explain the BPCI/Medicare program. Patient agreed with phone follow up for 3 months from [address] [place] [address] after discharge from [address] [place] [address]. Patient was enrolled under DRG    392 . BPCI/Medicare letter and brochure provided.

## 2017-12-27 NOTE — PROGRESS NOTES
Pt discharged,  provided transportation home. Discharge paperwork and prescriptions reviewed. Pt and  verbalized understanding. All questions and concerns addressed. IV access discontinued.

## 2018-02-03 ENCOUNTER — APPOINTMENT (OUTPATIENT)
Dept: GENERAL RADIOLOGY | Age: 73
End: 2018-02-03
Attending: EMERGENCY MEDICINE
Payer: MEDICARE

## 2018-02-03 ENCOUNTER — HOSPITAL ENCOUNTER (OUTPATIENT)
Age: 73
Discharge: HOME | End: 2018-02-03
Attending: EMERGENCY MEDICINE
Payer: MEDICARE

## 2018-02-03 VITALS
SYSTOLIC BLOOD PRESSURE: 168 MMHG | OXYGEN SATURATION: 97 % | BODY MASS INDEX: 20.22 KG/M2 | HEART RATE: 103 BPM | TEMPERATURE: 98 F | WEIGHT: 103 LBS | HEIGHT: 60 IN | DIASTOLIC BLOOD PRESSURE: 97 MMHG | RESPIRATION RATE: 18 BRPM

## 2018-02-03 DIAGNOSIS — S90.129A CONTUSION OF TOE WITHOUT DAMAGE TO NAIL, UNSPECIFIED TOE, INITIAL ENCOUNTER: Primary | ICD-10-CM

## 2018-02-03 PROCEDURE — 99213 OFFICE O/P EST LOW 20 MIN: CPT

## 2018-02-03 PROCEDURE — 73630 X-RAY EXAM OF FOOT: CPT | Performed by: EMERGENCY MEDICINE

## 2018-02-03 NOTE — ED INITIAL ASSESSMENT (HPI)
PATIENT ARRIVED AMBULATORY TO ROOM C/O PAIN TO THE 2ND DIGIT ON THE RIGHT FOOT PATIENT STATES \"A FEW DAYS AGO I WALKED INTO A WALL DURING THE NIGHT\" PATIENT STATES \"IT WAS FEELING BETTER BUT THE PAIN IS BACK AND IT WAS GOING UP MY LEG\" PT [address]

## 2018-02-03 NOTE — ED PROVIDER NOTES
Patient Seen in: [address]    History   Patient presents with:   Toe Pain    Stated Complaint: Toe Pain    HPI    Patient is a 50-year-old female that states she woke up in the middle of night to go the bathroom and she s Impression:  Contusion of toe without damage to nail, unspecified toe, initial encounter  (primary encounter diagnosis)    Disposition:  Discharge  2/3/2018 11:52 am    Follow-up:  Luiza Cote MD [address]

## 2018-03-08 ENCOUNTER — APPOINTMENT (OUTPATIENT)
Dept: CT IMAGING | Facility: HOSPITAL | Age: 73
DRG: 069 | End: 2018-03-08
Attending: EMERGENCY MEDICINE
Payer: MEDICARE

## 2018-03-08 ENCOUNTER — APPOINTMENT (OUTPATIENT)
Dept: GENERAL RADIOLOGY | Facility: HOSPITAL | Age: 73
DRG: 069 | End: 2018-03-08
Attending: EMERGENCY MEDICINE
Payer: MEDICARE

## 2018-03-08 ENCOUNTER — APPOINTMENT (OUTPATIENT)
Dept: ULTRASOUND IMAGING | Facility: HOSPITAL | Age: 73
DRG: 069 | End: 2018-03-08
Attending: INTERNAL MEDICINE
Payer: MEDICARE

## 2018-03-08 ENCOUNTER — APPOINTMENT (OUTPATIENT)
Dept: CV DIAGNOSTICS | Facility: HOSPITAL | Age: 73
DRG: 069 | End: 2018-03-08
Attending: INTERNAL MEDICINE
Payer: MEDICARE

## 2018-03-08 ENCOUNTER — HOSPITAL ENCOUNTER (INPATIENT)
Facility: HOSPITAL | Age: 73
LOS: 2 days | Discharge: HOME HEALTH | DRG: 069 | End: 2018-03-10
Attending: EMERGENCY MEDICINE | Admitting: INTERNAL MEDICINE
Payer: MEDICARE

## 2018-03-08 DIAGNOSIS — G45.1 TIA INVOLVING CAROTID ARTERY: Primary | ICD-10-CM

## 2018-03-08 PROBLEM — E44.0 MODERATE PROTEIN-CALORIE MALNUTRITION (HCC): Status: ACTIVE | Noted: 2018-03-08

## 2018-03-08 PROBLEM — F98.8 ATTENTION DEFICIT DISORDER (ADD) WITHOUT HYPERACTIVITY: Status: ACTIVE | Noted: 2018-03-08

## 2018-03-08 PROBLEM — E87.1 HYPONATREMIA: Status: ACTIVE | Noted: 2018-03-08

## 2018-03-08 LAB
ANION GAP SERPL CALC-SCNC: 6 MMOL/L (ref 0–18)
APTT PPP: 27.3 SECONDS (ref 23.2–35.3)
BACTERIA UR QL AUTO: NEGATIVE /HPF
BASOPHILS # BLD: 0.1 K/UL (ref 0–0.2)
BASOPHILS # BLD: 0.1 K/UL (ref 0–0.2)
BASOPHILS NFR BLD: 1 %
BASOPHILS NFR BLD: 1 %
BILIRUB UR QL: NEGATIVE
BUN SERPL-MCNC: 10 MG/DL (ref 8–20)
BUN/CREAT SERPL: 13.7 (ref 10–20)
CALCIUM SERPL-MCNC: 9.1 MG/DL (ref 8.5–10.5)
CHLORIDE SERPL-SCNC: 93 MMOL/L (ref 95–110)
CHOLEST SERPL-MCNC: 191 MG/DL (ref 110–200)
CLARITY UR: CLEAR
CO2 SERPL-SCNC: 25 MMOL/L (ref 22–32)
COLOR UR: YELLOW
CREAT SERPL-MCNC: 0.73 MG/DL (ref 0.5–1.5)
CREAT UR-MCNC: 26.3 MG/DL
EOSINOPHIL # BLD: 0.1 K/UL (ref 0–0.7)
EOSINOPHIL # BLD: 0.1 K/UL (ref 0–0.7)
EOSINOPHIL NFR BLD: 1 %
EOSINOPHIL NFR BLD: 2 %
ERYTHROCYTE [DISTWIDTH] IN BLOOD BY AUTOMATED COUNT: 13.1 % (ref 11–15)
ERYTHROCYTE [DISTWIDTH] IN BLOOD BY AUTOMATED COUNT: 13.2 % (ref 11–15)
GLUCOSE BLDC GLUCOMTR-MCNC: 95 MG/DL (ref 70–99)
GLUCOSE SERPL-MCNC: 107 MG/DL (ref 70–99)
GLUCOSE UR-MCNC: NEGATIVE MG/DL
HBA1C MFR BLD: 4.9 % (ref 4–6)
HCT VFR BLD AUTO: 35.5 % (ref 35–48)
HCT VFR BLD AUTO: 39.4 % (ref 35–48)
HDLC SERPL-MCNC: 60 MG/DL
HGB BLD-MCNC: 12.4 G/DL (ref 12–16)
HGB BLD-MCNC: 13.6 G/DL (ref 12–16)
INR BLD: 1 (ref 0.9–1.2)
INR BLD: 1.2 (ref 0.9–1.2)
KETONES UR-MCNC: NEGATIVE MG/DL
LDLC SERPL CALC-MCNC: 117 MG/DL (ref 0–99)
LEUKOCYTE ESTERASE UR QL STRIP.AUTO: NEGATIVE
LYMPHOCYTES # BLD: 1.7 K/UL (ref 1–4)
LYMPHOCYTES # BLD: 2 K/UL (ref 1–4)
LYMPHOCYTES NFR BLD: 16 %
LYMPHOCYTES NFR BLD: 31 %
MCH RBC QN AUTO: 32.6 PG (ref 27–32)
MCH RBC QN AUTO: 33.1 PG (ref 27–32)
MCHC RBC AUTO-ENTMCNC: 34.6 G/DL (ref 32–37)
MCHC RBC AUTO-ENTMCNC: 35 G/DL (ref 32–37)
MCV RBC AUTO: 94.2 FL (ref 80–100)
MCV RBC AUTO: 94.3 FL (ref 80–100)
MONOCYTES # BLD: 0.8 K/UL (ref 0–1)
MONOCYTES # BLD: 0.8 K/UL (ref 0–1)
MONOCYTES NFR BLD: 11 %
MONOCYTES NFR BLD: 8 %
NEUTROPHILS # BLD AUTO: 3.7 K/UL (ref 1.8–7.7)
NEUTROPHILS # BLD AUTO: 7.8 K/UL (ref 1.8–7.7)
NEUTROPHILS NFR BLD: 56 %
NEUTROPHILS NFR BLD: 74 %
NITRITE UR QL STRIP.AUTO: NEGATIVE
NONHDLC SERPL-MCNC: 131 MG/DL
OSMOLALITY SERPL: 260 MOSM/KG (ref 275–295)
OSMOLALITY UR CALC.SUM OF ELEC: 258 MOSM/KG (ref 275–295)
OSMOLALITY UR: 125 MOSM/KG (ref 300–1100)
PH UR: 6 [PH] (ref 5–8)
PLATELET # BLD AUTO: 277 K/UL (ref 140–400)
PLATELET # BLD AUTO: 293 K/UL (ref 140–400)
PMV BLD AUTO: 7.7 FL (ref 7.4–10.3)
PMV BLD AUTO: 8 FL (ref 7.4–10.3)
POTASSIUM SERPL-SCNC: 3.6 MMOL/L (ref 3.3–5.1)
PROT UR-MCNC: NEGATIVE MG/DL
PROTHROMBIN TIME: 13.3 SECONDS (ref 11.8–14.5)
PROTHROMBIN TIME: 14.4 SECONDS (ref 11.8–14.5)
RBC # BLD AUTO: 3.76 M/UL (ref 3.7–5.4)
RBC # BLD AUTO: 4.18 M/UL (ref 3.7–5.4)
RBC #/AREA URNS AUTO: 1 /HPF
SODIUM SERPL-SCNC: 124 MMOL/L (ref 136–144)
SODIUM SERPL-SCNC: 130 MMOL/L (ref 136–144)
SODIUM UR-SCNC: 14 MMOL/L
SP GR UR STRIP: 1.01 (ref 1–1.03)
TRIGL SERPL-MCNC: 69 MG/DL (ref 1–149)
TROPONIN I SERPL-MCNC: 0 NG/ML (ref ?–0.03)
UROBILINOGEN UR STRIP-ACNC: <2
VIT C UR-MCNC: NEGATIVE MG/DL
WBC # BLD AUTO: 10.5 K/UL (ref 4–11)
WBC # BLD AUTO: 6.7 K/UL (ref 4–11)
WBC #/AREA URNS AUTO: 1 /HPF

## 2018-03-08 PROCEDURE — 70450 CT HEAD/BRAIN W/O DYE: CPT | Performed by: EMERGENCY MEDICINE

## 2018-03-08 PROCEDURE — 99223 1ST HOSP IP/OBS HIGH 75: CPT | Performed by: OTHER

## 2018-03-08 PROCEDURE — B246YZZ ULTRASONOGRAPHY OF RIGHT AND LEFT HEART USING OTHER CONTRAST: ICD-10-PCS | Performed by: INTERNAL MEDICINE

## 2018-03-08 PROCEDURE — 93306 TTE W/DOPPLER COMPLETE: CPT | Performed by: INTERNAL MEDICINE

## 2018-03-08 PROCEDURE — 93880 EXTRACRANIAL BILAT STUDY: CPT | Performed by: INTERNAL MEDICINE

## 2018-03-08 PROCEDURE — 4A00X4Z MEASUREMENT OF CENTRAL NERVOUS ELECTRICAL ACTIVITY, EXTERNAL APPROACH: ICD-10-PCS | Performed by: OTHER

## 2018-03-08 PROCEDURE — 71045 X-RAY EXAM CHEST 1 VIEW: CPT | Performed by: EMERGENCY MEDICINE

## 2018-03-08 PROCEDURE — 95816 EEG AWAKE AND DROWSY: CPT | Performed by: OTHER

## 2018-03-08 RX ORDER — MECLIZINE HYDROCHLORIDE 25 MG/1
25 TABLET ORAL 2 TIMES DAILY PRN
Status: DISCONTINUED | OUTPATIENT
Start: 2018-03-08 | End: 2018-03-10

## 2018-03-08 RX ORDER — ONDANSETRON 2 MG/ML
4 INJECTION INTRAMUSCULAR; INTRAVENOUS ONCE
Status: COMPLETED | OUTPATIENT
Start: 2018-03-08 | End: 2018-03-08

## 2018-03-08 RX ORDER — SODIUM CHLORIDE 9 MG/ML
INJECTION, SOLUTION INTRAVENOUS CONTINUOUS
Status: DISCONTINUED | OUTPATIENT
Start: 2018-03-08 | End: 2018-03-10

## 2018-03-08 RX ORDER — FAMOTIDINE 20 MG/1
10 TABLET ORAL 2 TIMES DAILY
Status: DISCONTINUED | OUTPATIENT
Start: 2018-03-08 | End: 2018-03-10

## 2018-03-08 RX ORDER — ONDANSETRON 4 MG/1
4 TABLET, FILM COATED ORAL EVERY 8 HOURS PRN
Status: DISCONTINUED | OUTPATIENT
Start: 2018-03-08 | End: 2018-03-10

## 2018-03-08 RX ORDER — DEXTROAMPHETAMINE SACCHARATE, AMPHETAMINE ASPARTATE, DEXTROAMPHETAMINE SULFATE AND AMPHETAMINE SULFATE 1.25; 1.25; 1.25; 1.25 MG/1; MG/1; MG/1; MG/1
5 TABLET ORAL 2 TIMES DAILY
Status: DISCONTINUED | OUTPATIENT
Start: 2018-03-08 | End: 2018-03-08

## 2018-03-08 RX ORDER — DICYCLOMINE HYDROCHLORIDE 10 MG/5ML
20 SOLUTION ORAL
Status: DISCONTINUED | OUTPATIENT
Start: 2018-03-08 | End: 2018-03-10

## 2018-03-08 RX ORDER — ATORVASTATIN CALCIUM 10 MG/1
10 TABLET, FILM COATED ORAL NIGHTLY
Status: DISCONTINUED | OUTPATIENT
Start: 2018-03-08 | End: 2018-03-10

## 2018-03-08 RX ORDER — MECLIZINE HYDROCHLORIDE 25 MG/1
25 TABLET ORAL 3 TIMES DAILY PRN
COMMUNITY

## 2018-03-08 RX ORDER — CLOPIDOGREL BISULFATE 75 MG/1
75 TABLET ORAL DAILY
Status: DISCONTINUED | OUTPATIENT
Start: 2018-03-08 | End: 2018-03-10

## 2018-03-08 RX ORDER — HYDROCODONE BITARTRATE AND ACETAMINOPHEN 5; 325 MG/1; MG/1
1 TABLET ORAL 2 TIMES DAILY PRN
Status: DISCONTINUED | OUTPATIENT
Start: 2018-03-08 | End: 2018-03-10

## 2018-03-08 RX ORDER — ASPIRIN 81 MG/1
324 TABLET, CHEWABLE ORAL ONCE
Status: DISCONTINUED | OUTPATIENT
Start: 2018-03-08 | End: 2018-03-10

## 2018-03-08 RX ORDER — POLYVINYL ALCOHOL 14 MG/ML
2 SOLUTION/ DROPS OPHTHALMIC 4 TIMES DAILY PRN
Status: DISCONTINUED | OUTPATIENT
Start: 2018-03-08 | End: 2018-03-10

## 2018-03-08 NOTE — ED PROVIDER NOTES
Patient Seen in: [facility] AND [facility] Emergency Department    History   Patient presents with:  Stroke (neurologic)    Stated Complaint:     HPI    66 yo female presents via EMS with possible stroke symptoms.  The patient is a poor historian and  is n to light. Neck: Normal range of motion. Neck supple. Cardiovascular: Normal rate, regular rhythm, normal heart sounds and intact distal pulses. Pulmonary/Chest: Effort normal and breath sounds normal.   Abdominal: Soft.  Bowel sounds are normal. She RAINBOW DRAW BLUE   RAINBOW DRAW LAVENDER   RAINBOW DRAW DARK GREEN   RAINBOW DRAW LIGHT GREEN   RAINBOW DRAW GOLD   RAINBOW DRAW LAVENDER TALL (BNP)     EKG    Rate, intervals and axes as noted on EKG Report.   Rate: 76  Rhythm: Sinus Rhythm  Reading: no

## 2018-03-08 NOTE — PROGRESS NOTES
Therapeutic interchange from tagamet 200mg BID to Famotidine (Pepcid) 10 mg BID per P&T approved protocol.      Coreen TongD

## 2018-03-08 NOTE — CONSULTS
[facility] HOSP - [facility]    Report of Consultation    David Nazs Patient Status:  Inpatient    1945 MRN [id number]   Location [facility] 3W/SW Attending Amada Chun MD   Hosp Day # 0 PCP Christine Gifford MD     Date of Admission: Dicyclomine HCl (BENTYL) 10 MG/5ML syrup 20 mg 20 mg Oral TID AC and HS   Ondansetron HCl (ZOFRAN) tab 4 mg 4 mg Oral Q8H PRN   Clopidogrel Bisulfate (PLAVIX) tab 75 mg 75 mg Oral Daily   0.9%  NaCl infusion  Intravenous Continuous       Prescriptions Pr Imaging:  Xr Chest Ap Portable  (cpt=71045)    Result Date: 3/8/2018  CONCLUSION:  1. Little change from December 25, 2017. 2. Cardiomegaly. 3. Atherosclerosis. 4. Demineralization. 5. Scoliosis. 6. Osteoarthritis. 7. Postop changes cervical spine.  8.

## 2018-03-08 NOTE — PROGRESS NOTES
Have attempted to patient twice this morning-down for carotid,then echo-will see later this afternoon. Thank you for consult.

## 2018-03-08 NOTE — PHYSICAL THERAPY NOTE
Attempted physical therapy x 2 attempts. Patient off unit for testing. Will re-attempt as available. ELOINA Banegas aware.

## 2018-03-08 NOTE — SLP NOTE
Attempted to see patient for Bedside Swallow Evaluation, however, patient was off floor for ECHO. Will attempt again tomorrow.   Candi Smart MA/CCC-SLP  Speech Language Pathologist  [place]. [phone]

## 2018-03-09 ENCOUNTER — APPOINTMENT (OUTPATIENT)
Dept: MRI IMAGING | Facility: HOSPITAL | Age: 73
DRG: 069 | End: 2018-03-09
Attending: Other
Payer: MEDICARE

## 2018-03-09 LAB
ANION GAP SERPL CALC-SCNC: 4 MMOL/L (ref 0–18)
BASOPHILS # BLD: 0.1 K/UL (ref 0–0.2)
BASOPHILS NFR BLD: 1 %
BUN SERPL-MCNC: 10 MG/DL (ref 8–20)
BUN/CREAT SERPL: 15.4 (ref 10–20)
CALCIUM SERPL-MCNC: 9 MG/DL (ref 8.5–10.5)
CHLORIDE SERPL-SCNC: 100 MMOL/L (ref 95–110)
CO2 SERPL-SCNC: 28 MMOL/L (ref 22–32)
CREAT SERPL-MCNC: 0.65 MG/DL (ref 0.5–1.5)
EOSINOPHIL # BLD: 0.1 K/UL (ref 0–0.7)
EOSINOPHIL NFR BLD: 2 %
ERYTHROCYTE [DISTWIDTH] IN BLOOD BY AUTOMATED COUNT: 13.7 % (ref 11–15)
GLUCOSE BLDC GLUCOMTR-MCNC: 87 MG/DL (ref 70–99)
GLUCOSE SERPL-MCNC: 95 MG/DL (ref 70–99)
HCT VFR BLD AUTO: 36 % (ref 35–48)
HGB BLD-MCNC: 12.4 G/DL (ref 12–16)
LYMPHOCYTES # BLD: 1.5 K/UL (ref 1–4)
LYMPHOCYTES NFR BLD: 25 %
MCH RBC QN AUTO: 32.7 PG (ref 27–32)
MCHC RBC AUTO-ENTMCNC: 34.4 G/DL (ref 32–37)
MCV RBC AUTO: 95 FL (ref 80–100)
MONOCYTES # BLD: 0.6 K/UL (ref 0–1)
MONOCYTES NFR BLD: 11 %
NEUTROPHILS # BLD AUTO: 3.7 K/UL (ref 1.8–7.7)
NEUTROPHILS NFR BLD: 62 %
OSMOLALITY UR CALC.SUM OF ELEC: 273 MOSM/KG (ref 275–295)
PLATELET # BLD AUTO: 281 K/UL (ref 140–400)
PMV BLD AUTO: 8 FL (ref 7.4–10.3)
POTASSIUM SERPL-SCNC: 3.5 MMOL/L (ref 3.3–5.1)
RBC # BLD AUTO: 3.79 M/UL (ref 3.7–5.4)
SODIUM SERPL-SCNC: 132 MMOL/L (ref 136–144)
TSH SERPL-ACNC: 0.66 UIU/ML (ref 0.45–5.33)
VIT B12 SERPL-MCNC: 852 PG/ML (ref 181–914)
WBC # BLD AUTO: 6 K/UL (ref 4–11)

## 2018-03-09 PROCEDURE — 70551 MRI BRAIN STEM W/O DYE: CPT | Performed by: OTHER

## 2018-03-09 PROCEDURE — 99233 SBSQ HOSP IP/OBS HIGH 50: CPT | Performed by: OTHER

## 2018-03-09 RX ORDER — CLOPIDOGREL BISULFATE 75 MG/1
75 TABLET ORAL DAILY
Qty: 30 TABLET | Refills: 3 | Status: SHIPPED | OUTPATIENT
Start: 2018-03-10 | End: 2020-11-24 | Stop reason: ALTCHOICE

## 2018-03-09 RX ORDER — ATORVASTATIN CALCIUM 10 MG/1
10 TABLET, FILM COATED ORAL NIGHTLY
Qty: 30 TABLET | Refills: 3 | Status: SHIPPED | OUTPATIENT
Start: 2018-03-09 | End: 2020-11-24

## 2018-03-09 RX ORDER — SENNOSIDES 8.6 MG
8.6 TABLET ORAL 2 TIMES DAILY
Status: DISCONTINUED | OUTPATIENT
Start: 2018-03-09 | End: 2018-03-10

## 2018-03-09 RX ORDER — 0.9 % SODIUM CHLORIDE 0.9 %
VIAL (ML) INJECTION
Status: COMPLETED
Start: 2018-03-09 | End: 2018-03-09

## 2018-03-09 NOTE — PLAN OF CARE
Problem: Patient/Family Goals  Goal: Patient/Family Long Term Goal  Patient's Long Term Goal: Stable to be discharged home   Interventions:  - Medication administration  -Tests  -Diet/Nutrition   Outcome: Progressing    Goal: Patient/Family Short Term Goal safety  and administer oxygen as ordered  - If seizure occurs, turn patient to side and suction secretions as needed  - Instruct patient/family to notify RN of any seizure activity  - Instruct patient/family to call for assistance with activity based on as

## 2018-03-09 NOTE — PROGRESS NOTES
Apparently patient was very unsteady with physical therapy today. Therapy  feels that patient would benefit from rehab. When patient later seen by neurology, gait appear to be unremarkable.   However patient is having intermittent fluctuations as confirme

## 2018-03-09 NOTE — PROGRESS NOTES
[facility]    Progress Note    [facility] Patient Status:  Inpatient    1945 MRN [id number]   [facility] 3W/SW Attending Rachael Clark MD   Hosp Day # 1 PCP Niya Summers MD       Subjective:   Jordana Og There are mild microvascular white matter ischemic changes, likely related to long-standing hypertension and/or diabetes.       Us Carotid Doppler Bilat - Diag Im (cpt=93880)    Result Date: 3/8/2018  CONCLUSION: 0-49% luminal stenosis both internal caroti

## 2018-03-09 NOTE — DISCHARGE SUMMARY
[facility]    Discharge Summary    Camron Huang Patient Status:  Inpatient    1945 MRN [id number]   Location [facility] 3W/SW Attending Kendall Dandy, MD   Hosp Day # 1 PCP Grace Macdonald MD     Date of Admission: 3/ has been seen by dietitian to help with diet. I have asked patient to make follow-up appointment with neurosurgeon regarding her cervical fusion to see if this needs to be addressed as a cause of her symptoms.     Otherwise patient was medically stable for Tabs  · Clopidogrel Bisulfate 75 MG Tabs         Follow up Visits:  Follow-up with PCP in 2 weeks      Giuseppe Parsons  3/9/2018  10:05 AM

## 2018-03-09 NOTE — CM/SW NOTE
CTL progression of care note:  Order for PMR consult entered per Dr. Cassie Bronson. Contacted Viktoria at [facility] and notified her of request for consult. Pt info given. Notifed [employer].

## 2018-03-09 NOTE — PHYSICAL THERAPY NOTE
PHYSICAL THERAPY EVALUATION - INPATIENT     Room Number: 213/422-F  Evaluation Date: 3/9/2018  Type of Evaluation: Initial   Physician Order: PT Eval and Treat    Presenting Problem: p/w garbled speech, weakness, R facial droop, confusion; dx corotid michele anterior). Patient ambulated additional 48' with rolling walker for gait training at min A, demonstrated improved stability however impaired walker management especially during turns, improved with verbal/tactile cues and practice.  Patient and  educ level  Stairs to Enter : 3  Railing: Yes  Stairs to Bedroom: 0       Lives With: Spouse  Drives: Yes  Patient Owned Equipment: None  Patient Regularly Uses: Glasses    Prior Level of [place]: Independent ADLs, IADLs without use of assistive of assisti speed;Right decreased accuracy (L>R)  Coordination - Heel to Lorenzana: Left decreased speed;Right decreased speed (L>R)  Coordination - Rapid Alternating Movement: Left decreased speed;Left decreased accuracy; Right decreased speed;Right decreased accuracy (johnny session/findings; All patient questions and concerns addressed; Alarm set    CURRENT GOALS    Goals to be met by: 3/16/18  Patient Goal Patient's self-stated goal is: to get rid of the dizziness   Goal #1 Patient is able to demonstrate supine - sit EOB @ lev

## 2018-03-09 NOTE — PROGRESS NOTES
[facility] HOSP - [facility]    Progress Note    Cachorro Clark Patient Status:  Inpatient    1945 MRN [id number]   [facility] 3W/SW Attending Louis Sevilla MD   Hosp Day # 1 PCP Stanley Nye MD       Subjective:   Windy Medico (ANTIVERT) tab 25 mg 25 mg Oral BID PRN   Dicyclomine HCl (BENTYL) 10 MG/5ML syrup 20 mg 20 mg Oral TID AC and HS   Ondansetron HCl (ZOFRAN) tab 4 mg 4 mg Oral Q8H PRN   Clopidogrel Bisulfate (PLAVIX) tab 75 mg 75 mg Oral Daily   0.9%  NaCl infusion  Intra 3/8/2018  CONCLUSION: 0-49% luminal stenosis both internal carotid arteries. Xr Chest Ap Portable  (cpt=71045)    Result Date: 3/8/2018  CONCLUSION:  1. Little change from December 25, 2017. 2. Cardiomegaly. 3. Atherosclerosis. 4. Demineralization.

## 2018-03-09 NOTE — PROCEDURES
[address] [facility] [address] [place] [address] Georgette S      PATIENT'S NAME: Jame Benton   ATTENDING PHYSICIAN: Irvin Hillman MD   PATIENT ACCOUNT #: [de-identified] LOCATION: [address] [place] [address] #: [id number] DATE OF BIRTH: [birth year]

## 2018-03-09 NOTE — CM/SW NOTE
3/9/18 CM Discharge planning   Requested PMR for rehab recommendations, PT recommending acute rehab. Tentative referral made to [facility], pending PMR recommendations.   Mortimer Spiegel X [phone]

## 2018-03-09 NOTE — DIETARY NOTE
NUTRITION - INITIAL ASSESSMENT    Pt is at moderate nutrition risk. Pt does not meet malnutrition criteria.       RECOMMENDATIONS TO MD:  See Nutrition Intervention     NUTRITION DIAGNOSIS/PROBLEM:  Inadequate oral intake related to dysphagia as evidenced No  Cultural/Ethnic/Islam Preferences: No    MEDICATIONS: reviewed    LABS: reviewed    Recent Labs   03/08/18  0338 03/08/18  1749 03/09/18  0721   *  --  95   BUN 10  --  10   CREATSERUM 0.73  --  0.65   CA 9.1  --  9.0   * 130* 132*   K

## 2018-03-09 NOTE — OCCUPATIONAL THERAPY NOTE
OCCUPATIONAL THERAPY EVALUATION - INPATIENT     Room Number: 643/920-O  Evaluation Date: 3/9/2018  Type of Evaluation: Initial  Presenting Problem: TIA involving carotid artery    Physician Order: IP Consult to Occupational Therapy  Reason for Therapy: ADL decreased cognitive deficits, decreased initiation, decreased safety awareness, decreased balance, decreased coordination. These deficits manifest functionally while performing ADLs/IADLs.  The patient is below baseline and would benefit from skilled inpat OBJECTIVE  Precautions: Bed/chair alarm  Fall Risk: High fall risk    PAIN ASSESSMENT  Ratin          ACTIVITY TOLERANCE  Room air  No shortness of breath  Blood Pressure: 147/76    COGNITION  Attention Span:  difficulty attending to directions  Fo need…  -   Putting on and taking off regular lower body clothing?: A Little  -   Bathing (including washing, rinsing, drying)?: A Little  -   Toileting, which includes using toilet, bedpan or urinal? : A Little  -   Putting on and taking off regular upper

## 2018-03-09 NOTE — PROGRESS NOTES
[facility]    Progress Note    Andrewallison Ramires Patient Status:  Inpatient    1945 MRN [id number]   [facility] 3W/ Attending Caden Arriola MD   Hosp Day # 1 PCP Shital George MD     Subjective:     Constitut Ap Portable  (cpt=71045)    Result Date: 3/8/2018  CONCLUSION:  1. Little change from December 25, 2017. 2. Cardiomegaly. 3. Atherosclerosis. 4. Demineralization. 5. Scoliosis. 6. Osteoarthritis. 7. Postop changes cervical spine.  8. A preliminary report wa follow-up with me again in 2 weeks at which time we will recheck her sodium.       Elke Alvarez MD  3/9/2018

## 2018-03-09 NOTE — PROGRESS NOTES
EEG reveals a very slight generalized slowing of cerebral activity. No focal or generalized epileptogenic etiology. Consult previously dictated–probable left middle cerebral artery TIA. Recommendations pending results of test.  Thank you for consult.

## 2018-03-09 NOTE — SLP NOTE
ADULT SWALLOWING EVALUATION    ASSESSMENT    ASSESSMENT/OVERALL IMPRESSION:    Pt verbalized history of swallowing solid foods and \"usually eats pureed at home\". Pt eats solids (crackers/cookies) after being \"dipped in liquids to soft it up. \" Pt report Recommendations - Liquid: Thin    Compensatory Strategies Recommended: Slow rate; Alternate consistencies;Small bites and sips;Multiple swallows  Aspiration Precautions: Upright position; Slow rate;Small bites and sips  Medication Administration Recommendati Appears impaired     (Please note: Silent aspiration cannot be evaluated clinically.  Videofluoroscopic Swallow Study is required to rule-out silent aspiration.)    GOALS  Goal #1 The patient will tolerate pureed consistency and thin liquids without overt s

## 2018-03-09 NOTE — CONSULTS
[facility]    PATIENT'S NAME: Tavo Husain   ATTENDING PHYSICIAN: Henrry Ortega MD   CONSULTING PHYSICIAN: Felecia Ricci MD   PATIENT ACCOUNT#:   [id number]    LOCATION:  [facility] [address] RECORD #:   [id number]       DATE OF BIRTH:  0 Visual fields are full. NEUROLOGIC:  Cranial nerves III through VII and IX through XII normal.  Strength in the arms and legs normal.  No carotid bruits. Deep tendon reflexes symmetric without Babinski signs.   Alternating motion rates are normal.  No pr

## 2018-03-10 VITALS
RESPIRATION RATE: 18 BRPM | HEART RATE: 79 BPM | BODY MASS INDEX: 20.53 KG/M2 | HEIGHT: 58 IN | OXYGEN SATURATION: 95 % | SYSTOLIC BLOOD PRESSURE: 165 MMHG | DIASTOLIC BLOOD PRESSURE: 89 MMHG | WEIGHT: 97.81 LBS | TEMPERATURE: 98 F

## 2018-03-10 LAB
ANION GAP SERPL CALC-SCNC: 8 MMOL/L (ref 0–18)
BUN SERPL-MCNC: 5 MG/DL (ref 8–20)
BUN/CREAT SERPL: 8.8 (ref 10–20)
CALCIUM SERPL-MCNC: 9.2 MG/DL (ref 8.5–10.5)
CHLORIDE SERPL-SCNC: 101 MMOL/L (ref 95–110)
CO2 SERPL-SCNC: 25 MMOL/L (ref 22–32)
CREAT SERPL-MCNC: 0.57 MG/DL (ref 0.5–1.5)
GLUCOSE BLDC GLUCOMTR-MCNC: 103 MG/DL (ref 70–99)
GLUCOSE BLDC GLUCOMTR-MCNC: 88 MG/DL (ref 70–99)
GLUCOSE SERPL-MCNC: 94 MG/DL (ref 70–99)
OSMOLALITY UR CALC.SUM OF ELEC: 275 MOSM/KG (ref 275–295)
POTASSIUM SERPL-SCNC: 3.8 MMOL/L (ref 3.3–5.1)
SODIUM SERPL-SCNC: 134 MMOL/L (ref 136–144)

## 2018-03-10 RX ORDER — POTASSIUM CHLORIDE 20 MEQ/1
40 TABLET, EXTENDED RELEASE ORAL ONCE
Status: DISCONTINUED | OUTPATIENT
Start: 2018-03-10 | End: 2018-03-10

## 2018-03-10 NOTE — PLAN OF CARE
Goal: To go home. Patient is ambulating in room, no complaints of pain. Patient reports slight dizziness at times when ambulating. Accuchecks TID AC per protocol. Patient is able to be discharged today, awaiting PMR consult.

## 2018-03-10 NOTE — PROGRESS NOTES
[facility]    Progress Note    [facility] Patient Status:  Inpatient    1945 MRN [id number]   [facility] 3W/SW Attending Melissa Vasquez MD   Hosp Day # 2 PCP Kita Granado MD       Subjective:   Marita Bonilla intracranial process. No evidence of acute or subacute infarct. 2.  There are mild microvascular white matter ischemic changes, likely related to long-standing hypertension and/or diabetes.       Us Carotid Doppler Bilat - Diag Img (cpt=93880)    Result Da

## 2018-03-10 NOTE — PROGRESS NOTES
[facility] HOSP - [facility]    Progress Note    [facility] Patient Status:  Inpatient    1945 MRN [id number]   [facility] Attending Rachael Clark MD   Hosp Day # 2 PCP Niya Summers MD       Subjective:   Pt feels we changes, likely related to long-standing hypertension and/or diabetes. Us Carotid Doppler Bilat - Diag Img (cpt=93880)    Result Date: 3/8/2018  CONCLUSION: 0-49% luminal stenosis both internal carotid arteries.                  Kentrell Aleman MD

## 2018-03-10 NOTE — CONSULTS
[address] Patient Status:  Inpatient    1945 MRN [id number]   [facility] 3W/SW Attending Tamica Giles MD   Hosp Day # 2 PCP Marcela Lindsey MD     Patient Identification  Nicole Jordan is a 67 year ol consistent with abnormal left ventricular relaxation (grade 1     diastolic dysfunction). 2. Atrial septum: Negative Bubble Study. 3. Baseline ECG: Normal sinus rhythm. No previous study was available for comparison      Seen by Neurology.  Marshall to be a Polyvinyl Alcohol (LIQUI-TEARS) 1.4 % ophthalmic solution 2 drop 2 drop Both Eyes QID PRN          Smoking status: Former Smoker     Smokeless tobacco: Never Used    Alcohol use Yes    Comment: socially       Family History   Problem Relation Age of Onse 5. ROM WNL. Left Upper Extremity:  Strength is 5. ROM WNL. Right Lower Extremity:  Strength  is 5. ROM WNL. Left Lower Extremity: Strength  is 5. ROM WNL. Neuro: CNII-XII are grossly intact.  Sensation to dull touch intact in all extrem

## 2018-03-10 NOTE — HOME CARE LIAISON
MET WITH PATIENT TO DISCUSS HOME HEALTH SERVICES. PATIENT VERBALIZED AGREEMENT WITH SERVICES BEING PROVIDED BY [facility]IDENT[facility]. PROVIDED RESIDENTIAL BROCHURE WITH CONTACT INFORMATION, ALONG WITH LIAISON'S BUSINESS CARD.

## 2018-03-10 NOTE — PLAN OF CARE
AOx3, forgetful, VSS, no c/o pain. C/o dizziness early AM, meclizine given, resolved. Neuro Q4. Bed locked/lowest position, bed alarm on, call light and belongings within reach, calls appropriately.

## 2018-03-15 ENCOUNTER — LAB REQUISITION (OUTPATIENT)
Dept: LAB | Facility: HOSPITAL | Age: 73
End: 2018-03-15
Payer: MEDICARE

## 2018-03-15 DIAGNOSIS — G45.9 TRANSIENT CEREBRAL ISCHEMIC ATTACK: ICD-10-CM

## 2018-03-15 DIAGNOSIS — G45.1 CAROTID ARTERY SYNDROME HEMISPHERIC: ICD-10-CM

## 2018-03-15 DIAGNOSIS — J44.9 CHRONIC OBSTRUCTIVE PULMONARY DISEASE (HCC): ICD-10-CM

## 2018-03-15 LAB
ANION GAP SERPL CALC-SCNC: 7 MMOL/L (ref 0–18)
BUN SERPL-MCNC: 13 MG/DL (ref 8–20)
BUN/CREAT SERPL: 17.6 (ref 10–20)
CALCIUM SERPL-MCNC: 9.7 MG/DL (ref 8.5–10.5)
CHLORIDE SERPL-SCNC: 99 MMOL/L (ref 95–110)
CO2 SERPL-SCNC: 27 MMOL/L (ref 22–32)
CREAT SERPL-MCNC: 0.74 MG/DL (ref 0.5–1.5)
GLUCOSE SERPL-MCNC: 94 MG/DL (ref 70–99)
OSMOLALITY UR CALC.SUM OF ELEC: 276 MOSM/KG (ref 275–295)
POTASSIUM SERPL-SCNC: 4.4 MMOL/L (ref 3.3–5.1)
SODIUM SERPL-SCNC: 133 MMOL/L (ref 136–144)

## 2018-03-15 PROCEDURE — 80048 BASIC METABOLIC PNL TOTAL CA: CPT | Performed by: INTERNAL MEDICINE

## 2018-03-15 NOTE — PROGRESS NOTES
Na level is stable. Therefore will stay off [place] completely. Pt will need to discuss with psychiatry other options. I spoke with pt about above.   She understands and agrees  Pt has f/u appt with me next wk

## 2018-04-08 ENCOUNTER — HOSPITAL ENCOUNTER (OUTPATIENT)
Age: 73
Discharge: HOME | End: 2018-04-08
Attending: EMERGENCY MEDICINE
Payer: MEDICARE

## 2018-04-08 VITALS
WEIGHT: 100 LBS | HEIGHT: 59 IN | HEART RATE: 98 BPM | SYSTOLIC BLOOD PRESSURE: 128 MMHG | OXYGEN SATURATION: 99 % | BODY MASS INDEX: 20.16 KG/M2 | DIASTOLIC BLOOD PRESSURE: 56 MMHG | TEMPERATURE: 99 F | RESPIRATION RATE: 18 BRPM

## 2018-04-08 DIAGNOSIS — N76.0 ACUTE VAGINITIS: Primary | ICD-10-CM

## 2018-04-08 PROCEDURE — 99214 OFFICE O/P EST MOD 30 MIN: CPT

## 2018-04-08 PROCEDURE — 81002 URINALYSIS NONAUTO W/O SCOPE: CPT

## 2018-04-08 PROCEDURE — 87086 URINE CULTURE/COLONY COUNT: CPT | Performed by: EMERGENCY MEDICINE

## 2018-04-08 RX ORDER — FLUCONAZOLE 150 MG/1
150 TABLET ORAL ONCE
Qty: 1 TABLET | Refills: 0 | Status: SHIPPED | OUTPATIENT
Start: 2018-04-08 | End: 2018-04-08

## 2018-04-08 NOTE — ED PROVIDER NOTES
Patient Seen in: [facility]    History   Patient presents with:  Martha-PINKY (gynecologic)    Stated Complaint: POSS UTI    HPI    Patient is a 50-year-old female that states for the last 2 weeks she has had some mild dysuria. reactive to light. Neck: Neck supple. Cardiovascular: Normal rate, regular rhythm, normal heart sounds and intact distal pulses. Pulmonary/Chest: Effort normal and breath sounds normal. No respiratory distress. Abdominal: Soft.  Bowel sounds are no

## 2018-04-08 NOTE — ED INITIAL ASSESSMENT (HPI)
2weeks of vaginal irritation  Burning with urination to area, pt, using water to flush area  +itching  No discharge

## 2018-06-27 ENCOUNTER — HOSPITAL ENCOUNTER (OUTPATIENT)
Age: 73
Discharge: HOME | End: 2018-06-27
Attending: FAMILY MEDICINE
Payer: MEDICARE

## 2018-06-27 VITALS
SYSTOLIC BLOOD PRESSURE: 122 MMHG | WEIGHT: 105 LBS | HEART RATE: 81 BPM | OXYGEN SATURATION: 99 % | HEIGHT: 59 IN | RESPIRATION RATE: 20 BRPM | DIASTOLIC BLOOD PRESSURE: 91 MMHG | TEMPERATURE: 98 F | BODY MASS INDEX: 21.17 KG/M2

## 2018-06-27 DIAGNOSIS — R11.0 NAUSEA: Primary | ICD-10-CM

## 2018-06-27 DIAGNOSIS — M54.2 NECK PAIN: ICD-10-CM

## 2018-06-27 PROCEDURE — 99213 OFFICE O/P EST LOW 20 MIN: CPT

## 2018-06-27 PROCEDURE — 99214 OFFICE O/P EST MOD 30 MIN: CPT

## 2018-06-27 RX ORDER — OMEPRAZOLE 40 MG/1
40 CAPSULE, DELAYED RELEASE ORAL DAILY
Qty: 14 CAPSULE | Refills: 0 | Status: SHIPPED | OUTPATIENT
Start: 2018-06-27 | End: 2018-07-11

## 2018-06-27 RX ORDER — ASPIRIN 81 MG
100 TABLET, DELAYED RELEASE (ENTERIC COATED) ORAL 2 TIMES DAILY
Qty: 14 TABLET | Refills: 0 | Status: SHIPPED | OUTPATIENT
Start: 2018-06-27 | End: 2018-07-04

## 2018-06-27 RX ORDER — ONDANSETRON 4 MG/1
4 TABLET, ORALLY DISINTEGRATING ORAL EVERY 8 HOURS PRN
Qty: 9 TABLET | Refills: 0 | Status: SHIPPED | OUTPATIENT
Start: 2018-06-27 | End: 2018-06-30

## 2018-06-27 RX ORDER — HYDROCODONE BITARTRATE AND ACETAMINOPHEN 5; 325 MG/1; MG/1
1 TABLET ORAL EVERY 6 HOURS PRN
Qty: 8 TABLET | Refills: 0 | Status: SHIPPED | OUTPATIENT
Start: 2018-06-27 | End: 2018-06-29

## 2018-06-27 NOTE — ED INITIAL ASSESSMENT (HPI)
States for the last 2 weeks she has trouble eating. C/o epigastric pain, nausea, and \"heartburn\". No fever. Hx of narrow esophagus post cervical fusion. No choking episodes. Some episodes of diarrhea last night. Taking 's norco for pain in neck.

## 2018-06-27 NOTE — ED PROVIDER NOTES
Patient Seen in: [address] [facility]    History   Patient presents with:  Nausea    Stated Complaint: nause    HPI    Pt is here with complaints of nausea, heartburn and abdominal discomfort.    X weeks  Also c/o chronic neck pain Normal range of motion. Neck supple. No JVD present. No tracheal deviation present. Cardiovascular: Normal rate, regular rhythm, normal heart sounds and intact distal pulses. Exam reveals no friction rub. No murmur heard.   Pulmonary/Chest: Effort nor R-0    docusate sodium 100 MG Oral Tab  Take 1 tablet (100 mg total) by mouth 2 (two) times daily. , Normal, Disp-14 tablet, R-0    HYDROcodone-acetaminophen (NORCO) 5-325 MG Oral Tab  Take 1 tablet by mouth every 6 (six) hours as needed for Pain., Print, D

## 2018-11-06 VITALS
WEIGHT: 109 LBS | HEART RATE: 70 BPM | BODY MASS INDEX: 22.88 KG/M2 | OXYGEN SATURATION: 96 % | HEIGHT: 58 IN | SYSTOLIC BLOOD PRESSURE: 145 MMHG | DIASTOLIC BLOOD PRESSURE: 65 MMHG

## 2019-03-06 ENCOUNTER — OFFICE VISIT (OUTPATIENT)
Dept: PODIATRY CLINIC | Facility: CLINIC | Age: 74
End: 2019-03-06
Payer: MEDICARE

## 2019-03-06 DIAGNOSIS — G57.91 NEURITIS OF ANKLE, RIGHT: Primary | ICD-10-CM

## 2019-03-06 PROCEDURE — 99203 OFFICE O/P NEW LOW 30 MIN: CPT | Performed by: PODIATRIST

## 2019-03-10 NOTE — PROGRESS NOTES
Pro Jefferson is a 75year old female. Patient presents with:   Foot Pain: Right - onset 2/2018 - no injury - has pain has pain in the R big and 2nd toe - statrted to have tingling in her toes after CS sx - rates pain as 6/10 on and off - uses a cream for Number of children: Not on file      Years of education: Not on file      Highest education level: Not on file    Tobacco Use      Smoking status: Former Smoker      Smokeless tobacco: Never Used    Substance and Sexual Activity      Alcohol use:  Yes worsen or fail to improve.     Lien Espinal, KOFI  3/10/2019

## 2019-06-27 ENCOUNTER — TELEPHONE (OUTPATIENT)
Dept: PODIATRY CLINIC | Facility: CLINIC | Age: 74
End: 2019-06-27

## 2019-06-27 NOTE — TELEPHONE ENCOUNTER
Pt saw  in March - asking about the foam insets that she was given to hold her toes together - she states she cannot find them at [employer] - asking if some can be mailed to her

## 2019-07-02 NOTE — TELEPHONE ENCOUNTER
Called pt's preferred # and no voicemail. Phone rang several times with no answer. Unable to LM. Please send call back to triage when pt calls back. Thank you.

## 2019-07-03 NOTE — TELEPHONE ENCOUNTER
Spoke to pt and informed that she may go by the [facility] office of [facility] to get toe separators today between 1:00 - 4:30. Pt asking if she will be billed for this and informed her that I am not sure. Pt verbalized understanding.     Lonny Ghosh and Dr. Magnus Olszewski

## 2019-08-17 ENCOUNTER — HOSPITAL ENCOUNTER (OUTPATIENT)
Age: 74
Discharge: HOME | End: 2019-08-17
Attending: FAMILY MEDICINE
Payer: MEDICARE

## 2019-08-17 VITALS
WEIGHT: 98 LBS | OXYGEN SATURATION: 96 % | DIASTOLIC BLOOD PRESSURE: 68 MMHG | TEMPERATURE: 99 F | SYSTOLIC BLOOD PRESSURE: 158 MMHG | RESPIRATION RATE: 18 BRPM | HEART RATE: 88 BPM | BODY MASS INDEX: 20 KG/M2

## 2019-08-17 DIAGNOSIS — N30.01 ACUTE CYSTITIS WITH HEMATURIA: Primary | ICD-10-CM

## 2019-08-17 LAB
BILIRUB UR QL STRIP: NEGATIVE
COLOR UR: YELLOW
GLUCOSE UR STRIP-MCNC: NEGATIVE MG/DL
KETONES UR STRIP-MCNC: NEGATIVE MG/DL
NITRITE UR QL STRIP: POSITIVE
PH UR STRIP: 8.5 [PH]
PROT UR STRIP-MCNC: 100 MG/DL
SP GR UR STRIP: 1.02
UROBILINOGEN UR STRIP-ACNC: <2 MG/DL

## 2019-08-17 PROCEDURE — 99214 OFFICE O/P EST MOD 30 MIN: CPT

## 2019-08-17 PROCEDURE — 87086 URINE CULTURE/COLONY COUNT: CPT | Performed by: FAMILY MEDICINE

## 2019-08-17 PROCEDURE — 87186 SC STD MICRODIL/AGAR DIL: CPT | Performed by: FAMILY MEDICINE

## 2019-08-17 PROCEDURE — 81002 URINALYSIS NONAUTO W/O SCOPE: CPT

## 2019-08-17 PROCEDURE — 87088 URINE BACTERIA CULTURE: CPT | Performed by: FAMILY MEDICINE

## 2019-08-17 RX ORDER — OXCARBAZEPINE 150 MG/1
150 TABLET, FILM COATED ORAL 3 TIMES DAILY
COMMUNITY
End: 2020-11-24

## 2019-08-17 RX ORDER — CEPHALEXIN 500 MG/1
500 CAPSULE ORAL 2 TIMES DAILY
Qty: 20 CAPSULE | Refills: 0 | Status: SHIPPED | OUTPATIENT
Start: 2019-08-17 | End: 2019-08-27

## 2019-08-17 RX ORDER — ONDANSETRON 4 MG/1
4 TABLET, ORALLY DISINTEGRATING ORAL EVERY 8 HOURS PRN
COMMUNITY
End: 2020-11-24

## 2019-08-17 RX ORDER — SENNOSIDES 8.6 MG
8.6 TABLET ORAL NIGHTLY
COMMUNITY

## 2019-08-17 RX ORDER — GABAPENTIN 100 MG/1
100 CAPSULE ORAL 3 TIMES DAILY
COMMUNITY
End: 2020-11-24

## 2019-08-17 NOTE — ED PROVIDER NOTES
Patient Seen in: [facility]    History   Patient presents with:  Urinary Symptoms (urologic)    Stated Complaint: urinary symptoms    HPI    Patient complains of urinary frequency, urgency and dysuria that began about 3-4 Used    Alcohol use: Yes      Comment: socially    Drug use: No      Review of Systems    Positive for stated complaint: urinary symptoms  Other systems are as noted in HPI. Constitutional and vital signs reviewed.       All other systems reviewed and nega Normal, Disp-20 capsule, R-0

## 2019-08-17 NOTE — ED INITIAL ASSESSMENT (HPI)
PATIENT ARRIVED AMBULATORY TO ROOM C/O SYMPTOMS OF A UTI X3 DAYS. PATIENT WAS RECENTLY ADMITTED TO THE HOSPITAL FOR \"MEDICATION CHANGE\" +DYSURIA. +FREQUENCY/URGENCY. NO FEVERS. NO N/V/D.

## 2020-01-15 ENCOUNTER — HOSPITAL ENCOUNTER (OUTPATIENT)
Age: 75
Discharge: HOME | End: 2020-01-15
Attending: EMERGENCY MEDICINE
Payer: MEDICARE

## 2020-01-15 VITALS
DIASTOLIC BLOOD PRESSURE: 68 MMHG | BODY MASS INDEX: 20.16 KG/M2 | OXYGEN SATURATION: 97 % | HEIGHT: 59 IN | SYSTOLIC BLOOD PRESSURE: 128 MMHG | RESPIRATION RATE: 20 BRPM | WEIGHT: 100 LBS | HEART RATE: 88 BPM | TEMPERATURE: 98 F

## 2020-01-15 DIAGNOSIS — R30.0 DYSURIA: Primary | ICD-10-CM

## 2020-01-15 DIAGNOSIS — N76.0 ACUTE VAGINITIS: ICD-10-CM

## 2020-01-15 LAB
BILIRUB UR QL STRIP: NEGATIVE
CLARITY UR: CLEAR
COLOR UR: YELLOW
GLUCOSE UR STRIP-MCNC: NEGATIVE MG/DL
KETONES UR STRIP-MCNC: NEGATIVE MG/DL
LEUKOCYTE ESTERASE UR QL STRIP: NEGATIVE
NITRITE UR QL STRIP: NEGATIVE
PH UR STRIP: 7 [PH]
PROT UR STRIP-MCNC: NEGATIVE MG/DL
SP GR UR STRIP: 1.02
UROBILINOGEN UR STRIP-ACNC: <2 MG/DL

## 2020-01-15 PROCEDURE — 87086 URINE CULTURE/COLONY COUNT: CPT | Performed by: EMERGENCY MEDICINE

## 2020-01-15 PROCEDURE — 81002 URINALYSIS NONAUTO W/O SCOPE: CPT

## 2020-01-15 PROCEDURE — 99214 OFFICE O/P EST MOD 30 MIN: CPT

## 2020-01-15 RX ORDER — PHENAZOPYRIDINE HYDROCHLORIDE 200 MG/1
200 TABLET, FILM COATED ORAL 3 TIMES DAILY PRN
Qty: 6 TABLET | Refills: 0 | Status: SHIPPED | OUTPATIENT
Start: 2020-01-15 | End: 2020-01-22

## 2020-01-15 NOTE — ED INITIAL ASSESSMENT (HPI)
PATIENT ARRIVED AMBULATORY TO ROOM C/O SYMPTOMS OF A UTI THAT STARTED 4 DAYS AGO. +BURNING WITH URINATION. NO HEMATURIA. NO ABDOMINAL PAIN. NO BACK PAIN.  NO FEVERS
15-Aug-2019

## 2020-01-15 NOTE — ED PROVIDER NOTES
Patient Seen in: [address] [facility]      History   Patient presents with:  Urinary Symptoms    Stated Complaint: UTI    HPI    Patient presents to the immediate care center complaining of dysuria and frequency of urination for th acute distress. Appearance: She is well-developed. HENT:      Head: Normocephalic. Eyes:      Conjunctiva/sclera: Conjunctivae normal.   Neck:      Musculoskeletal: Normal range of motion and neck supple.    Cardiovascular:      Rate and Rhythm: Nor 0.625 MG/GM Vaginal Cream  Place 0.5 g vaginally daily for 7 days. , Chapin Disp-1 Tube, R-0    Phenazopyridine HCl 200 MG Oral Tab  Take 1 tablet (200 mg total) by mouth 3 (three) times daily as needed for Pain., Print, Disp-6 tablet, R-0

## 2020-02-21 ENCOUNTER — OFFICE VISIT (OUTPATIENT)
Dept: PHYSICAL THERAPY | Facility: HOSPITAL | Age: 75
End: 2020-02-21
Attending: INTERNAL MEDICINE
Payer: MEDICARE

## 2020-02-21 ENCOUNTER — ORDER TRANSCRIPTION (OUTPATIENT)
Dept: PHYSICAL THERAPY | Facility: HOSPITAL | Age: 75
End: 2020-02-21

## 2020-02-21 DIAGNOSIS — M54.9 DORSALGIA: Primary | ICD-10-CM

## 2020-02-21 PROCEDURE — 97162 PT EVAL MOD COMPLEX 30 MIN: CPT

## 2020-02-21 PROCEDURE — 97112 NEUROMUSCULAR REEDUCATION: CPT

## 2020-02-21 PROCEDURE — 97110 THERAPEUTIC EXERCISES: CPT

## 2020-02-21 NOTE — PROGRESS NOTES
LUMBAR SPINE EVALUATION:   Diego Coelho    9/25/1945  Referring Physician:  Luiza Cote  Diagnosis: Dorsalgia (M54.9)  Date of Onset: 9/2019  Initial Evaluation Date: 2/21/2020  Insurance: Medicare        Through date: 5/21/2020 not willing to go through surgery. She also is not open to injections and only wants to take opioids for the pain. She does not currently report taking opioids. History of current condition: 5 month history of flare up of back pain.    Current functional Pain Ave 8/10       Better: heat, medication, OTC patches  Sensation: no changes  Night: falls asleep - ok , Position - sides, Hours of sleep - 10 , Wakes - 1x/ bathroom, Sweats - no  Incontinence: no  Saddle Anesthesia: no  OB-Gyn:  NVD0  GI: incre 1      Lumbopelvic 2/21/2020   Control  Very poor        Neural mobility 2/21/2020   SLR R wnl   SLR L wnl      NICKO 2/21/2020   R Min limit w (+) LBP   L Min limit w (+) LBP        Functional Performance:    2/21/2020     Motor Control   Double leg squat (CST): Kristine Fabian MD on 3/08/2018 at 7:24          =====  CONCLUSION:   1. Little change from December 25, 2017.  2. Cardiomegaly. 3. Atherosclerosis. 4. Demineralization. 5. Scoliosis. 6. Osteoarthritis. 7. Postop changes cervical spine.   8. A [phone].  If you have any questions, please contact me at Dept: [phone]    Sincerely,  Electronically signed by therapist: Melissa Ramirez, PT    [de-identified] certification required: Yes  I certify the need for these services furnished under this plan

## 2020-02-24 ENCOUNTER — APPOINTMENT (OUTPATIENT)
Dept: PHYSICAL THERAPY | Facility: HOSPITAL | Age: 75
End: 2020-02-24
Attending: INTERNAL MEDICINE
Payer: MEDICARE

## 2020-02-24 ENCOUNTER — TELEPHONE (OUTPATIENT)
Dept: PHYSICAL THERAPY | Facility: HOSPITAL | Age: 75
End: 2020-02-24

## 2020-02-27 ENCOUNTER — OFFICE VISIT (OUTPATIENT)
Dept: PHYSICAL THERAPY | Facility: HOSPITAL | Age: 75
End: 2020-02-27
Attending: INTERNAL MEDICINE
Payer: MEDICARE

## 2020-02-27 PROCEDURE — 97112 NEUROMUSCULAR REEDUCATION: CPT

## 2020-02-27 PROCEDURE — 97110 THERAPEUTIC EXERCISES: CPT

## 2020-02-27 NOTE — PROGRESS NOTES
Diego Coelho    9/25/1945  Referring Physician:  Luiza Cote  Diagnosis: Dorsalgia (M54.9)  Date of Onset: 9/2019  Initial Evaluation Date: 2/21/2020  Insurance: Medicare        Through date: 5/21/2020          Next MD Visit: none schedu knowledge and understanding of patient on pain related topics was explored to create baseline. Education - 2.  Sensitive Nerves   Patient educated on the concept of  the nervous system as the bodies alarm system, and the role of  nociception to warn the omar  educated on therapeutic neuroscience concepts as noted above. Time given to answer all questions and reinforce concepts of the importance of movement and especially movement to get her out of her shifted postural alignment.   Pt requires extra time Devices: none       Assistance: none  Gillet Test: wnl  Standing Forward Bend Test: wnl    Abdominals 2/21/2020   Tone  Very poor     Diastasis in fingers 2/21/2020   6 cm above umbilicus 1   At umbilicus 1   6 cm below umbilicus 1      Lumbopelvic 2/21/20 scoliosis. Mild osteoarthritic changes are seen in the spine and both shoulders. Postop changes in the cervical spine. OTHER:             Negative. Dictated by (CST): Anne Stover MD on 3/08/2018 at 7:21       Approved by (CST):  Cher Patel

## 2020-03-09 NOTE — ED NOTES
Pt calls stating she is once again having the \"mosquito bite\" area of mild discomfort. She was wondering if she should just use her estrogen cream She was seen over 6 weeks ago.   After reading note, I advised pt she could try it, but if her symptoms did

## 2020-03-10 ENCOUNTER — TELEPHONE (OUTPATIENT)
Dept: PHYSICAL THERAPY | Facility: HOSPITAL | Age: 75
End: 2020-03-10

## 2020-03-10 NOTE — TELEPHONE ENCOUNTER
Left message advising pt to discontinue any exercises that she feels are worsening her pain and they will be reassessed at her next visit. Pt on waitlist for midday sooner appts.

## 2020-03-16 ENCOUNTER — TELEPHONE (OUTPATIENT)
Dept: PHYSICAL THERAPY | Facility: HOSPITAL | Age: 75
End: 2020-03-16

## 2020-03-16 NOTE — TELEPHONE ENCOUNTER
Pt called to cancel her 3/17 appointment due to concerns over COVID 19. She wishes to keep her 3/27 appointment at this time.

## 2020-03-16 NOTE — TELEPHONE ENCOUNTER
Pt called and advised that appointments are to be cancelled through 9/85 due to public health concerns. Next appt is 3/31.

## 2020-03-17 ENCOUNTER — TELEPHONE (OUTPATIENT)
Dept: PHYSICAL THERAPY | Facility: HOSPITAL | Age: 75
End: 2020-03-17

## 2020-03-20 ENCOUNTER — APPOINTMENT (OUTPATIENT)
Dept: PHYSICAL THERAPY | Facility: HOSPITAL | Age: 75
End: 2020-03-20
Attending: INTERNAL MEDICINE
Payer: MEDICARE

## 2020-03-24 ENCOUNTER — APPOINTMENT (OUTPATIENT)
Dept: PHYSICAL THERAPY | Facility: HOSPITAL | Age: 75
End: 2020-03-24
Attending: INTERNAL MEDICINE
Payer: MEDICARE

## 2020-03-25 ENCOUNTER — TELEPHONE (OUTPATIENT)
Dept: PHYSICAL THERAPY | Facility: HOSPITAL | Age: 75
End: 2020-03-25

## 2020-03-25 ENCOUNTER — HOSPITAL ENCOUNTER (EMERGENCY)
Facility: HOSPITAL | Age: 75
Discharge: HOME | End: 2020-03-25
Attending: EMERGENCY MEDICINE
Payer: MEDICARE

## 2020-03-25 VITALS
TEMPERATURE: 98 F | BODY MASS INDEX: 23.56 KG/M2 | WEIGHT: 120 LBS | HEART RATE: 82 BPM | RESPIRATION RATE: 16 BRPM | DIASTOLIC BLOOD PRESSURE: 74 MMHG | OXYGEN SATURATION: 97 % | HEIGHT: 60 IN | SYSTOLIC BLOOD PRESSURE: 142 MMHG

## 2020-03-25 DIAGNOSIS — R53.1 WEAKNESS GENERALIZED: Primary | ICD-10-CM

## 2020-03-25 LAB
ALBUMIN SERPL-MCNC: 3.5 G/DL (ref 3.4–5)
ALP LIVER SERPL-CCNC: 109 U/L (ref 55–142)
ALT SERPL-CCNC: 20 U/L (ref 13–56)
ANION GAP SERPL CALC-SCNC: 6 MMOL/L (ref 0–18)
AST SERPL-CCNC: 23 U/L (ref 15–37)
BASOPHILS # BLD AUTO: 0.05 X10(3) UL (ref 0–0.2)
BASOPHILS NFR BLD AUTO: 0.8 %
BILIRUB DIRECT SERPL-MCNC: <0.1 MG/DL (ref 0–0.2)
BILIRUB SERPL-MCNC: 0.4 MG/DL (ref 0.1–2)
BILIRUB UR QL: NEGATIVE
BUN BLD-MCNC: 11 MG/DL (ref 7–18)
BUN/CREAT SERPL: 15.3 (ref 10–20)
CALCIUM BLD-MCNC: 9.2 MG/DL (ref 8.5–10.1)
CHLORIDE SERPL-SCNC: 104 MMOL/L (ref 98–112)
CLARITY UR: CLEAR
CO2 SERPL-SCNC: 26 MMOL/L (ref 21–32)
COLOR UR: YELLOW
CREAT BLD-MCNC: 0.72 MG/DL (ref 0.55–1.02)
DEPRECATED RDW RBC AUTO: 42.7 FL (ref 35.1–46.3)
EOSINOPHIL # BLD AUTO: 0.22 X10(3) UL (ref 0–0.7)
EOSINOPHIL NFR BLD AUTO: 3.6 %
ERYTHROCYTE [DISTWIDTH] IN BLOOD BY AUTOMATED COUNT: 11.9 % (ref 11–15)
GLUCOSE BLD-MCNC: 100 MG/DL (ref 70–99)
GLUCOSE UR-MCNC: NEGATIVE MG/DL
HCT VFR BLD AUTO: 36.6 % (ref 35–48)
HGB BLD-MCNC: 12.3 G/DL (ref 12–16)
HGB UR QL STRIP.AUTO: NEGATIVE
IMM GRANULOCYTES # BLD AUTO: 0.01 X10(3) UL (ref 0–1)
IMM GRANULOCYTES NFR BLD: 0.2 %
KETONES UR-MCNC: NEGATIVE MG/DL
LEUKOCYTE ESTERASE UR QL STRIP.AUTO: NEGATIVE
LIPASE SERPL-CCNC: 92 U/L (ref 73–393)
LYMPHOCYTES # BLD AUTO: 1.96 X10(3) UL (ref 1–4)
LYMPHOCYTES NFR BLD AUTO: 32 %
M PROTEIN MFR SERPL ELPH: 6.9 G/DL (ref 6.4–8.2)
MCH RBC QN AUTO: 33 PG (ref 26–34)
MCHC RBC AUTO-ENTMCNC: 33.6 G/DL (ref 31–37)
MCV RBC AUTO: 98.1 FL (ref 80–100)
MONOCYTES # BLD AUTO: 0.55 X10(3) UL (ref 0.1–1)
MONOCYTES NFR BLD AUTO: 9 %
NEUTROPHILS # BLD AUTO: 3.33 X10 (3) UL (ref 1.5–7.7)
NEUTROPHILS # BLD AUTO: 3.33 X10(3) UL (ref 1.5–7.7)
NEUTROPHILS NFR BLD AUTO: 54.4 %
NITRITE UR QL STRIP.AUTO: NEGATIVE
OSMOLALITY SERPL CALC.SUM OF ELEC: 281 MOSM/KG (ref 275–295)
PH UR: 6 [PH] (ref 5–8)
PLATELET # BLD AUTO: 300 10(3)UL (ref 150–450)
POTASSIUM SERPL-SCNC: 4.7 MMOL/L (ref 3.5–5.1)
PROT UR-MCNC: NEGATIVE MG/DL
RBC # BLD AUTO: 3.73 X10(6)UL (ref 3.8–5.3)
SODIUM SERPL-SCNC: 136 MMOL/L (ref 136–145)
SP GR UR STRIP: 1.02 (ref 1–1.03)
UROBILINOGEN UR STRIP-ACNC: <2
WBC # BLD AUTO: 6.1 X10(3) UL (ref 4–11)

## 2020-03-25 PROCEDURE — 81003 URINALYSIS AUTO W/O SCOPE: CPT | Performed by: EMERGENCY MEDICINE

## 2020-03-25 PROCEDURE — 36415 COLL VENOUS BLD VENIPUNCTURE: CPT

## 2020-03-25 PROCEDURE — 93010 ELECTROCARDIOGRAM REPORT: CPT | Performed by: EMERGENCY MEDICINE

## 2020-03-25 PROCEDURE — 85025 COMPLETE CBC W/AUTO DIFF WBC: CPT | Performed by: EMERGENCY MEDICINE

## 2020-03-25 PROCEDURE — 83690 ASSAY OF LIPASE: CPT | Performed by: EMERGENCY MEDICINE

## 2020-03-25 PROCEDURE — 80076 HEPATIC FUNCTION PANEL: CPT | Performed by: EMERGENCY MEDICINE

## 2020-03-25 PROCEDURE — 93005 ELECTROCARDIOGRAM TRACING: CPT

## 2020-03-25 PROCEDURE — 85025 COMPLETE CBC W/AUTO DIFF WBC: CPT

## 2020-03-25 PROCEDURE — 99283 EMERGENCY DEPT VISIT LOW MDM: CPT

## 2020-03-25 PROCEDURE — 80048 BASIC METABOLIC PNL TOTAL CA: CPT | Performed by: EMERGENCY MEDICINE

## 2020-03-25 NOTE — TELEPHONE ENCOUNTER
Left message w patient advising that exercises have been mailed. Asked for return phone call to discuss her status.

## 2020-03-25 NOTE — ED NOTES
VERBALIZED UNDERSTANDING OF DC INSTRUCTIONS AND APPROPRIATE FOLLOW UP CARE. [address] [facility]. GIVEN COPY OF DC INSTRUCTIONS.

## 2020-03-25 NOTE — ED INITIAL ASSESSMENT (HPI)
Patient presents to ER via EMS with c/o weakness, dizziness, and mid abdominal pain over the last two weeks. Denies cough, fever, recent sick contacts.

## 2020-03-25 NOTE — ED PROVIDER NOTES
Patient Seen in: [facility] Emergency Department      History   Patient presents with:  Dizziness  Abdomen/Flank Pain    Stated Complaint: dizzy, abd pain    HPI    76-year-old female with listed history was from reported chronic fatigue problems equal, round, and reactive to light. Neck: Normal range of motion. Neck supple. Cardiovascular: Normal rate, regular rhythm and intact distal pulses. Pulmonary/Chest: Effort normal. No respiratory distress. Abdominal: Soft. There is no tenderness. hospital.  She is stable for discharge home. She knows to follow-up with her doctor and return with any worsening or change.               Disposition and Plan     Clinical Impression:  Weakness generalized  (primary encounter diagnosis)    Disposition:  D

## 2020-03-27 ENCOUNTER — APPOINTMENT (OUTPATIENT)
Dept: PHYSICAL THERAPY | Facility: HOSPITAL | Age: 75
End: 2020-03-27
Attending: INTERNAL MEDICINE
Payer: MEDICARE

## 2020-03-31 ENCOUNTER — APPOINTMENT (OUTPATIENT)
Dept: PHYSICAL THERAPY | Facility: HOSPITAL | Age: 75
End: 2020-03-31
Attending: INTERNAL MEDICINE
Payer: MEDICARE

## 2020-04-02 ENCOUNTER — TELEPHONE (OUTPATIENT)
Dept: PHYSICAL THERAPY | Facility: HOSPITAL | Age: 75
End: 2020-04-02

## 2020-04-02 ENCOUNTER — APPOINTMENT (OUTPATIENT)
Dept: PHYSICAL THERAPY | Facility: HOSPITAL | Age: 75
End: 2020-04-02
Attending: INTERNAL MEDICINE
Payer: MEDICARE

## 2020-04-02 NOTE — TELEPHONE ENCOUNTER
Returned call to patient and discussed her current HEP and progression of exercises that were mailed to her. Pt reports that she has not been feeling well and has not yet tried any of the new exercises.   Pt advised to start w 1-2 of each and progress slow

## 2020-04-07 ENCOUNTER — APPOINTMENT (OUTPATIENT)
Dept: PHYSICAL THERAPY | Facility: HOSPITAL | Age: 75
End: 2020-04-07
Attending: INTERNAL MEDICINE
Payer: MEDICARE

## 2020-04-10 ENCOUNTER — APPOINTMENT (OUTPATIENT)
Dept: PHYSICAL THERAPY | Facility: HOSPITAL | Age: 75
End: 2020-04-10
Attending: INTERNAL MEDICINE
Payer: MEDICARE

## 2020-04-17 ENCOUNTER — APPOINTMENT (OUTPATIENT)
Dept: PHYSICAL THERAPY | Facility: HOSPITAL | Age: 75
End: 2020-04-17
Attending: INTERNAL MEDICINE
Payer: MEDICARE

## 2020-04-24 ENCOUNTER — APPOINTMENT (OUTPATIENT)
Dept: PHYSICAL THERAPY | Facility: HOSPITAL | Age: 75
End: 2020-04-24
Attending: INTERNAL MEDICINE
Payer: MEDICARE

## 2020-07-07 ENCOUNTER — TELEPHONE (OUTPATIENT)
Dept: PHYSICAL THERAPY | Facility: HOSPITAL | Age: 75
End: 2020-07-07

## 2020-07-07 NOTE — TELEPHONE ENCOUNTER
Made return phone call to patient to discuss care. Pt states that she has not been able to do any of the pelvic tilt type exercises given to her and asked how to proceed.   Pt advised to contact her physician for further medical plan of care and then retur

## 2020-08-03 ENCOUNTER — TELEPHONE (OUTPATIENT)
Dept: NEUROLOGY | Facility: CLINIC | Age: 75
End: 2020-08-03

## 2020-08-03 NOTE — TELEPHONE ENCOUNTER
Patient called for sooner new patient appt with Dr. Lindsey March than 8/24/20. Patient offered appt 8/5/20 in [place] but declined.  Patient would like to be seen in [place].  No sooner appts available at this time in [place].

## 2020-08-13 ENCOUNTER — TELEPHONE (OUTPATIENT)
Dept: NEUROLOGY | Facility: CLINIC | Age: 75
End: 2020-08-13

## 2020-08-13 ENCOUNTER — TELEPHONE (OUTPATIENT)
Dept: PAIN CLINIC | Facility: HOSPITAL | Age: 75
End: 2020-08-13

## 2020-08-13 NOTE — TELEPHONE ENCOUNTER
New patient appt 8/24/20 with Dr. Fannie Claros    Patient called to cancel her appt for her  Lenni Neely appt on 9/8/20 at 2p.  appt cancelled

## 2020-08-13 NOTE — TELEPHONE ENCOUNTER
----- Message from NIA Nguyen sent at 8/13/2020  1:18 PM CDT -----  Denisha Sanford,   This patient called our inpatient consult line regarding her appointment with Dr. Alcira Johnston. Can you please give her a call back. Thank you!     Sherice

## 2020-08-13 NOTE — TELEPHONE ENCOUNTER
Patient called inpatient pain line regarding outpatient appointment. Has appointment scheduled with Dr. Charlotte Ballard.  Will forward to appropriate team

## 2020-10-27 ENCOUNTER — ORDER TRANSCRIPTION (OUTPATIENT)
Dept: ADMINISTRATIVE | Facility: HOSPITAL | Age: 75
End: 2020-10-27

## 2020-10-27 DIAGNOSIS — Z11.59 SCREENING FOR VIRAL DISEASE: ICD-10-CM

## 2020-10-27 DIAGNOSIS — M54.50 SEVERE LOW BACK PAIN: Primary | ICD-10-CM

## 2020-10-27 DIAGNOSIS — Z01.818 PREOP EXAMINATION: ICD-10-CM

## 2020-11-16 ENCOUNTER — APPOINTMENT (OUTPATIENT)
Dept: LAB | Age: 75
End: 2020-11-16
Attending: ANESTHESIOLOGY
Payer: MEDICARE

## 2020-11-16 DIAGNOSIS — Z01.818 PREOP EXAMINATION: ICD-10-CM

## 2020-11-16 DIAGNOSIS — M54.50 SEVERE LOW BACK PAIN: ICD-10-CM

## 2020-11-16 DIAGNOSIS — Z11.59 SCREENING FOR VIRAL DISEASE: ICD-10-CM

## 2020-11-18 ENCOUNTER — ORDER TRANSCRIPTION (OUTPATIENT)
Dept: ADMINISTRATIVE | Facility: HOSPITAL | Age: 75
End: 2020-11-18

## 2020-11-18 DIAGNOSIS — Z01.818 PREPROCEDURAL EXAMINATION: Primary | ICD-10-CM

## 2020-11-19 ENCOUNTER — HOSPITAL ENCOUNTER (OUTPATIENT)
Dept: MRI IMAGING | Facility: HOSPITAL | Age: 75
End: 2020-11-19
Attending: ANESTHESIOLOGY
Payer: MEDICARE

## 2020-11-19 ENCOUNTER — HOSPITAL ENCOUNTER (OUTPATIENT)
Dept: MRI IMAGING | Facility: HOSPITAL | Age: 75
Discharge: HOME | End: 2020-11-19
Attending: ANESTHESIOLOGY
Payer: MEDICARE

## 2020-11-22 ENCOUNTER — APPOINTMENT (OUTPATIENT)
Dept: LAB | Facility: HOSPITAL | Age: 75
End: 2020-11-22
Attending: ANESTHESIOLOGY
Payer: MEDICARE

## 2020-11-22 DIAGNOSIS — Z01.818 PREPROCEDURAL EXAMINATION: ICD-10-CM

## 2020-11-24 VITALS — HEIGHT: 59 IN | BODY MASS INDEX: 21.37 KG/M2 | WEIGHT: 106 LBS

## 2020-11-24 RX ORDER — ONDANSETRON 4 MG/1
4 TABLET, ORALLY DISINTEGRATING ORAL DAILY
COMMUNITY

## 2020-11-24 RX ORDER — HYDROCODONE BITARTRATE AND ACETAMINOPHEN 10; 325 MG/1; MG/1
1 TABLET ORAL EVERY 6 HOURS PRN
COMMUNITY

## 2020-11-24 RX ORDER — CHOLECALCIFEROL (VITAMIN D3) 1250 MCG
1 CAPSULE ORAL WEEKLY
COMMUNITY

## 2020-11-24 RX ORDER — SODIUM CHLORIDE, SODIUM LACTATE, POTASSIUM CHLORIDE, CALCIUM CHLORIDE 600; 310; 30; 20 MG/100ML; MG/100ML; MG/100ML; MG/100ML
INJECTION, SOLUTION INTRAVENOUS CONTINUOUS
Status: CANCELLED | OUTPATIENT
Start: 2020-11-24

## 2020-11-24 RX ORDER — ACETAMINOPHEN 500 MG
1000 TABLET ORAL ONCE
Status: CANCELLED | OUTPATIENT
Start: 2020-11-24 | End: 2020-11-24

## 2020-11-25 ENCOUNTER — HOSPITAL ENCOUNTER (OUTPATIENT)
Dept: MRI IMAGING | Facility: HOSPITAL | Age: 75
Discharge: HOME | End: 2020-11-25
Attending: ANESTHESIOLOGY
Payer: MEDICARE

## 2020-11-30 ENCOUNTER — HOSPITAL ENCOUNTER (OUTPATIENT)
Age: 75
Discharge: HOME | End: 2020-11-30
Payer: MEDICARE

## 2020-11-30 VITALS
SYSTOLIC BLOOD PRESSURE: 153 MMHG | TEMPERATURE: 98 F | DIASTOLIC BLOOD PRESSURE: 91 MMHG | HEART RATE: 96 BPM | RESPIRATION RATE: 18 BRPM | OXYGEN SATURATION: 96 %

## 2020-11-30 DIAGNOSIS — N89.8 VAGINAL IRRITATION: Primary | ICD-10-CM

## 2020-11-30 PROCEDURE — 99213 OFFICE O/P EST LOW 20 MIN: CPT

## 2020-11-30 PROCEDURE — 81002 URINALYSIS NONAUTO W/O SCOPE: CPT

## 2020-11-30 PROCEDURE — 99214 OFFICE O/P EST MOD 30 MIN: CPT

## 2020-11-30 RX ORDER — PREDNISONE 10 MG/1
10 TABLET ORAL DAILY
Qty: 5 TABLET | Refills: 0 | Status: SHIPPED | OUTPATIENT
Start: 2020-11-30 | End: 2020-12-05

## 2020-11-30 NOTE — ED INITIAL ASSESSMENT (HPI)
PATIENT REPORTS 1 WEEK HISTORY OF BURNING AFTER URINATION. STATES PAIN DECREASES AFTER WASHING/RINSING THE AREA WITH WATER. DENIES DISCHARGE.

## 2020-11-30 NOTE — ED PROVIDER NOTES
Patient Seen in: [facility]      History   Patient presents with:  Gerard    Stated Complaint: urinate burns     HPI    This is a 16-year-old female who presents with a chief complaint of irritation and burning to the vagina.   Patient states HPI.  Constitutional and vital signs reviewed. All other systems reviewed and negative except as noted above.     Physical Exam     ED Triage Vitals [11/30/20 1127]   BP (!) 153/91   Pulse 96   Resp 18   Temp 97.7 °F (36.5 °C)   Temp src Temporal   SpO DIPSTICK - Abnormal; Notable for the following components:       Result Value    Blood, Urine Trace-Intact (*)     All other components within normal limits           MDM      Patient is well-appearing, nontoxic appearance. Trace blood on the urine dip.

## 2020-12-07 ENCOUNTER — TELEPHONE (OUTPATIENT)
Dept: FAMILY MEDICINE CLINIC | Facility: CLINIC | Age: 75
End: 2020-12-07

## 2020-12-07 NOTE — TELEPHONE ENCOUNTER
Pt called with several questions. Initially it was concerning vaginal irritation. Had been seen at [facility] 8 days ago. Used Clotrimazole\"3day\" externally only, and then follow ed by some of her \"old Premarin cream\".  Instructed that she would need to have th

## 2021-03-04 DIAGNOSIS — Z23 NEED FOR VACCINATION: ICD-10-CM

## 2021-03-11 ENCOUNTER — IMMUNIZATION (OUTPATIENT)
Dept: LAB | Facility: HOSPITAL | Age: 76
End: 2021-03-11
Attending: HOSPITALIST
Payer: MEDICARE

## 2021-03-11 DIAGNOSIS — Z23 NEED FOR VACCINATION: Primary | ICD-10-CM

## 2021-03-11 PROCEDURE — 0011A SARSCOV2 VAC 100MCG/0.5ML IM: CPT

## 2021-04-08 ENCOUNTER — IMMUNIZATION (OUTPATIENT)
Dept: LAB | Facility: HOSPITAL | Age: 76
End: 2021-04-08
Attending: EMERGENCY MEDICINE
Payer: MEDICARE

## 2021-04-08 DIAGNOSIS — Z23 NEED FOR VACCINATION: Primary | ICD-10-CM

## 2021-04-08 PROCEDURE — 0012A SARSCOV2 VAC 100MCG/0.5ML IM: CPT

## 2022-02-16 ENCOUNTER — IMAGING SERVICES (OUTPATIENT)
Dept: OTHER | Age: 77
End: 2022-02-16

## 2022-06-28 ENCOUNTER — EXTERNAL RECORD (OUTPATIENT)
Dept: HEALTH INFORMATION MANAGEMENT | Facility: OTHER | Age: 77
End: 2022-06-28

## 2022-07-05 ENCOUNTER — EXTERNAL RECORD (OUTPATIENT)
Dept: HEALTH INFORMATION MANAGEMENT | Facility: OTHER | Age: 77
End: 2022-07-05

## 2022-07-29 ENCOUNTER — HOSPITAL ENCOUNTER (EMERGENCY)
Facility: HOSPITAL | Age: 77
Discharge: HOME | End: 2022-07-29
Attending: EMERGENCY MEDICINE
Payer: MEDICARE

## 2022-07-29 VITALS
TEMPERATURE: 98 F | SYSTOLIC BLOOD PRESSURE: 122 MMHG | OXYGEN SATURATION: 98 % | DIASTOLIC BLOOD PRESSURE: 70 MMHG | HEART RATE: 88 BPM | BODY MASS INDEX: 17.75 KG/M2 | WEIGHT: 104 LBS | RESPIRATION RATE: 18 BRPM | HEIGHT: 64 IN

## 2022-07-29 DIAGNOSIS — G89.29 CHRONIC LOW BACK PAIN WITH RIGHT-SIDED SCIATICA, UNSPECIFIED BACK PAIN LATERALITY: Primary | ICD-10-CM

## 2022-07-29 DIAGNOSIS — M54.41 CHRONIC LOW BACK PAIN WITH RIGHT-SIDED SCIATICA, UNSPECIFIED BACK PAIN LATERALITY: Primary | ICD-10-CM

## 2022-07-29 PROCEDURE — 99283 EMERGENCY DEPT VISIT LOW MDM: CPT

## 2022-07-29 RX ORDER — HYDROCODONE BITARTRATE AND ACETAMINOPHEN 10; 325 MG/1; MG/1
1 TABLET ORAL ONCE
Status: COMPLETED | OUTPATIENT
Start: 2022-07-29 | End: 2022-07-29

## 2022-07-29 RX ORDER — HYDROCODONE BITARTRATE AND ACETAMINOPHEN 10; 325 MG/1; MG/1
1 TABLET ORAL EVERY 6 HOURS PRN
Qty: 25 TABLET | Refills: 0 | Status: SHIPPED | OUTPATIENT
Start: 2022-07-29

## 2022-07-29 NOTE — ED INITIAL ASSESSMENT (HPI)
Pt arrived via EMS from home because pt reports back pain. Pt states that she took her last Norco today and needs a refill. Pt states that her doctor is no vacation this week. Pt reports 10/10 back pain.

## 2023-01-30 ENCOUNTER — TELEPHONE (OUTPATIENT)
Dept: NEUROSURGERY | Age: 78
End: 2023-01-30

## 2023-01-30 DIAGNOSIS — M54.50 LUMBAR BACK PAIN: Primary | ICD-10-CM

## 2023-02-14 ENCOUNTER — APPOINTMENT (OUTPATIENT)
Dept: NEUROSURGERY | Age: 78
End: 2023-02-14

## 2023-02-15 ENCOUNTER — APPOINTMENT (OUTPATIENT)
Dept: NEUROSURGERY | Age: 78
End: 2023-02-15

## 2023-02-16 ENCOUNTER — APPOINTMENT (OUTPATIENT)
Dept: NEUROSURGERY | Age: 78
End: 2023-02-16

## 2023-03-28 ENCOUNTER — OFFICE VISIT (OUTPATIENT)
Dept: NEUROSURGERY | Age: 78
End: 2023-03-28

## 2023-03-28 ENCOUNTER — EXTERNAL RECORD (OUTPATIENT)
Dept: HEALTH INFORMATION MANAGEMENT | Facility: OTHER | Age: 78
End: 2023-03-28

## 2023-03-28 ENCOUNTER — IMAGING SERVICES (OUTPATIENT)
Dept: GENERAL RADIOLOGY | Age: 78
End: 2023-03-28

## 2023-03-28 VITALS
RESPIRATION RATE: 16 BRPM | DIASTOLIC BLOOD PRESSURE: 69 MMHG | SYSTOLIC BLOOD PRESSURE: 115 MMHG | HEART RATE: 94 BPM | HEIGHT: 60 IN | BODY MASS INDEX: 21.6 KG/M2 | WEIGHT: 110 LBS

## 2023-03-28 DIAGNOSIS — M54.50 LUMBAR BACK PAIN: ICD-10-CM

## 2023-03-28 DIAGNOSIS — M41.9 SCOLIOSIS OF LUMBAR SPINE, UNSPECIFIED SCOLIOSIS TYPE: Primary | ICD-10-CM

## 2023-03-28 PROCEDURE — 72084 X-RAY EXAM ENTIRE SPI 6/> VW: CPT | Performed by: NURSE PRACTITIONER

## 2023-03-28 PROCEDURE — 77073 BONE LENGTH STUDIES: CPT | Performed by: NURSE PRACTITIONER

## 2023-03-28 PROCEDURE — 99203 OFFICE O/P NEW LOW 30 MIN: CPT | Performed by: NURSE PRACTITIONER

## 2023-03-28 RX ORDER — DIAZEPAM 5 MG/1
TABLET ORAL
COMMUNITY
Start: 2023-03-06

## 2023-03-28 RX ORDER — BUPRENORPHINE 20 UG/H
PATCH TRANSDERMAL
COMMUNITY
Start: 2023-03-03

## 2023-03-28 RX ORDER — ONDANSETRON 4 MG/1
4 TABLET, ORALLY DISINTEGRATING ORAL DAILY PRN
COMMUNITY

## 2023-03-28 RX ORDER — HYDROCODONE BITARTRATE AND ACETAMINOPHEN 10; 325 MG/1; MG/1
TABLET ORAL
COMMUNITY
Start: 2023-03-17

## 2023-03-28 ASSESSMENT — ENCOUNTER SYMPTOMS
NUMBNESS: 0
BACK PAIN: 1
WEAKNESS: 0

## 2023-04-11 ENCOUNTER — TELEPHONE (OUTPATIENT)
Dept: PHYSICAL THERAPY | Facility: HOSPITAL | Age: 78
End: 2023-04-11

## 2023-04-11 NOTE — TELEPHONE ENCOUNTER
Pt call returned inquiring about PT for scoliosis. Pt given information on PT in our system, Alex Barba. Pt advised that she will need a rx from her physician to get PT care.

## 2023-04-13 ENCOUNTER — TELEPHONE (OUTPATIENT)
Dept: PHYSICAL THERAPY | Facility: HOSPITAL | Age: 78
End: 2023-04-13

## 2023-04-14 ENCOUNTER — TELEPHONE (OUTPATIENT)
Dept: NEUROSURGERY | Age: 78
End: 2023-04-14

## 2023-04-18 ENCOUNTER — ORDER TRANSCRIPTION (OUTPATIENT)
Dept: PHYSICAL THERAPY | Facility: HOSPITAL | Age: 78
End: 2023-04-18

## 2023-04-18 DIAGNOSIS — M54.50 LOW BACK PAIN: ICD-10-CM

## 2023-04-18 DIAGNOSIS — M41.9 SCOLIOSIS: Primary | ICD-10-CM

## 2023-04-19 ENCOUNTER — TELEPHONE (OUTPATIENT)
Dept: PHYSICAL THERAPY | Age: 78
End: 2023-04-19

## 2023-04-19 NOTE — CM/SW NOTE
Met with patient at bedside. Patient states she is home with her  and is still driving. Patient is a current smoker, is currently on 2L of oxygen but does not think she will need it when she is discharged.  Patient states \"It's just some congestion
Implemented All Universal Safety Interventions:  [place] to call system. Call bell, personal items and telephone within reach. Instruct patient to call for assistance. Room bathroom lighting operational. Non-slip footwear when patient is off stretcher. Physically safe environment: no spills, clutter or unnecessary equipment. Stretcher in lowest position, wheels locked, appropriate side rails in place.

## 2023-05-10 ENCOUNTER — APPOINTMENT (OUTPATIENT)
Dept: PHYSICAL THERAPY | Age: 78
End: 2023-05-10
Attending: NURSE PRACTITIONER
Payer: MEDICARE

## 2023-05-10 ENCOUNTER — TELEPHONE (OUTPATIENT)
Dept: PHYSICAL THERAPY | Age: 78
End: 2023-05-10

## 2023-05-17 ENCOUNTER — OFFICE VISIT (OUTPATIENT)
Dept: PHYSICAL THERAPY | Age: 78
End: 2023-05-17
Attending: NURSE PRACTITIONER
Payer: MEDICARE

## 2023-05-17 PROCEDURE — 97112 NEUROMUSCULAR REEDUCATION: CPT

## 2023-05-17 PROCEDURE — 97162 PT EVAL MOD COMPLEX 30 MIN: CPT

## 2023-05-24 ENCOUNTER — OFFICE VISIT (OUTPATIENT)
Dept: PHYSICAL THERAPY | Age: 78
End: 2023-05-24
Attending: NURSE PRACTITIONER
Payer: MEDICARE

## 2023-05-24 PROCEDURE — 97530 THERAPEUTIC ACTIVITIES: CPT

## 2023-05-24 PROCEDURE — 97140 MANUAL THERAPY 1/> REGIONS: CPT

## 2023-05-24 PROCEDURE — 97112 NEUROMUSCULAR REEDUCATION: CPT

## 2023-05-24 NOTE — PROGRESS NOTES
Diagnosis: Scoliosis (M41.9)  Low back pain (M54.50)  Insurance (Authorized # of Visits):  Contreras Simon Physician: Dr. Angelina North MD visit: none scheduled  Fall Risk: standard         Precautions: n/a           Cancellations: 0   No Shows: 0  Most recent Evaluation/Progress note: Visit 1 on 5/17/23    Subjective: Pt reports she is in a lot of pain today. Laying down did not help. Had to take another pain pill today. Pain: 9/10    Assessment: initiated manual interventions focused on L lumbar paraspinals. Reviewed breathing mechanics and posture using mirror for visual cueing. Instructed pt on posture elongation for decompression. Reviewed pt imaging and educated pt of effect of compression, dysfunctional breathing and decreased proximal stability on symptoms as related to presentation. Objective: (See Flowsheet Below)  Decreased diaphragmatic expansion    Goals: (to be met in 10-12 visits)   1. Pt will verbalize and demo compliance with HEP at least 75% of the time to allow for independent management of symptoms - in progress  2. Pt will demo improved trunk mobility by at least 25% for all deficit motions to be able to transition and turn without limitation - in progress  3. Pt will demo improved core and pelvic strength for improved stability with ambulation - in progress  4. Pt will demo improved breathing mecahnics with good diaphragmatic and lateral costal expansion to facilitate best 3D alignment - in progress  5. Pt will demo improved posture awareness, requiring cueing <25% of the time to allow for independent management of symptoms - in progress     Date: 5/24/23  Tx #: 2 Date: Tx#: Date:   Tx#:   Therapeutic exercises Nustep lvl [id number] UEs/LEs           Manual Therapy Rsidely: STM thoracolumbar paraspinals focus on L lumbar     Neuromuscular Re-education Seated diaphragmatic breathing with self palpation   - with mirror cueing   - with TrA activation on exhalation     TherAct Reviewed and discussed CT imaging of lumbar spine    Reviewed and discussed importance of proper diaphragmatic function       Current HEP:   5/17: Patient was instructed in and issued a HEP for: diaphragmatic breathing  5/24: TrA draw in on exhalation; added focus on self elongation with breathing    Plan: Continue with manual interventions as indicated. Review breathing mechanics. May initiate gentle elongation work at stall bar. Initiate core and hip strengthening. May review pt home LE strengthening ex's. Charges:  Man x1 (10mins), Neuro Re-ed x1 (16mins), TherAct x1 (14mins), TherEx x0 (5mins)       Total Timed Treatment: 45 min  Total Treatment Time: 45 min

## 2023-05-31 ENCOUNTER — APPOINTMENT (OUTPATIENT)
Dept: PHYSICAL THERAPY | Age: 78
End: 2023-05-31
Attending: NURSE PRACTITIONER
Payer: MEDICARE

## 2023-05-31 ENCOUNTER — TELEPHONE (OUTPATIENT)
Dept: PHYSICAL THERAPY | Age: 78
End: 2023-05-31

## 2023-06-02 ENCOUNTER — APPOINTMENT (OUTPATIENT)
Dept: PHYSICAL THERAPY | Age: 78
End: 2023-06-02
Attending: NURSE PRACTITIONER
Payer: MEDICARE

## 2023-06-08 ENCOUNTER — OFFICE VISIT (OUTPATIENT)
Dept: PHYSICAL THERAPY | Age: 78
End: 2023-06-08
Attending: NURSE PRACTITIONER
Payer: MEDICARE

## 2023-06-08 PROCEDURE — 97112 NEUROMUSCULAR REEDUCATION: CPT

## 2023-06-08 PROCEDURE — 97110 THERAPEUTIC EXERCISES: CPT

## 2023-06-08 PROCEDURE — 97140 MANUAL THERAPY 1/> REGIONS: CPT

## 2023-06-08 NOTE — PROGRESS NOTES
Diagnosis: Scoliosis (M41.9)  Low back pain (M54.50)  Insurance (Authorized # of Visits):  Keegan Quezada Physician: Dr. Coleen North MD visit: none scheduled  Fall Risk: standard         Precautions: n/a           Cancellations: 0   No Shows: 0  Most recent Evaluation/Progress note: Visit 1 on 5/17/23    Subjective: Pt reports she has been feeling a little better the past couple days. Pain: 9/10 this morning; 7/10 currently with pain medication    Assessment:Continued with manual interventions. Greatest limitations at L lumbar paraspinals. Initiated gentle seated strengthening. Cueing required for proper core recruitment and posture control    Objective: (See Flowsheet Below)  Decreased diaphragmatic expansion    Goals: (to be met in 10-12 visits)   1. Pt will verbalize and demo compliance with HEP at least 75% of the time to allow for independent management of symptoms - in progress  2. Pt will demo improved trunk mobility by at least 25% for all deficit motions to be able to transition and turn without limitation - in progress  3. Pt will demo improved core and pelvic strength for improved stability with ambulation - in progress  4. Pt will demo improved breathing mecahnics with good diaphragmatic and lateral costal expansion to facilitate best 3D alignment - in progress  5. Pt will demo improved posture awareness, requiring cueing <25% of the time to allow for independent management of symptoms - in progress     Date: 5/24/23  Tx #: 2 Date: 6/8/23  Tx#:3 Date:   Tx#:   Therapeutic exercises Nustep lvl [id number] UEs/LEs       Nustep lvl [id number] UEs/LEs    Seated TrA    Seated TrA march 10x R/L    Seated LAQ 10x R/L    Seated hip add with ball [id number]    Manual Therapy Rsidely: STM thoracolumbar paraspinals focus on L lumbar Rsidely: STM thoracolumbar paraspinals focus on L lumbar; assessed glutes and R lumbar    Neuromuscular Re-education Seated diaphragmatic breathing with self palpation   - with mirror cueing   - with TrA activation on exhalation Seated diaphragmatic breathing with self palpation   - with TrA activation on exhalation   - with cervical flex/ext cueing    TherAct Reviewed and discussed CT imaging of lumbar spine    Reviewed and discussed importance of proper diaphragmatic function Reviewed previous HEP and instructed pt on appropriate ex's. Current HEP:   5/17: Patient was instructed in and issued a HEP for: diaphragmatic breathing  5/24: TrA draw in on exhalation; added focus on self elongation with breathing  6/8: seated march, laq, hip add    Plan: Continue with manual interventions as indicated. Review breathing mechanics. May initiate gentle elongation work at stall bar. Initiate core and hip strengthening. Charges:  Man x1 (10mins), Neuro Re-ed x1 (9mins), TherAct x0 (5mins), TherEx x1 (16 mins)       Total Timed Treatment: 40 min  Total Treatment Time: 42 min

## 2023-06-12 ENCOUNTER — APPOINTMENT (OUTPATIENT)
Dept: PHYSICAL THERAPY | Age: 78
End: 2023-06-12
Attending: NURSE PRACTITIONER
Payer: MEDICARE

## 2023-06-14 ENCOUNTER — OFFICE VISIT (OUTPATIENT)
Dept: PHYSICAL THERAPY | Age: 78
End: 2023-06-14
Attending: NURSE PRACTITIONER
Payer: MEDICARE

## 2023-06-14 PROCEDURE — 97110 THERAPEUTIC EXERCISES: CPT

## 2023-06-14 PROCEDURE — 97112 NEUROMUSCULAR REEDUCATION: CPT

## 2023-06-14 PROCEDURE — 97140 MANUAL THERAPY 1/> REGIONS: CPT

## 2023-06-14 NOTE — PROGRESS NOTES
Diagnosis: Scoliosis (M41.9)  Low back pain (M54.50)  Insurance (Authorized # of Visits):  Ceci Alvares Physician: Dr. Amanda Ibarra  Next MD visit: none scheduled  Fall Risk: standard         Precautions: n/a           Cancellations: 0   No Shows: 0  Most recent Evaluation/Progress note: Visit 1 on 5/17/23    Subjective: Pt reports back continues to be sore. Not sure if she is doing breathing exercises correctly  Pain: 7/10    Assessment: Reviewed breathing. Cued pt for maintenance of expansion and posture alignment on exhalation. Progressed hip strengthening. Continued with manual interventions. Pt reported improvement in symptoms by end of session. Objective: (See Flowsheet Below)  Decreased diaphragmatic expansion    Goals: (to be met in 10-12 visits)   1. Pt will verbalize and demo compliance with HEP at least 75% of the time to allow for independent management of symptoms - in progress  2. Pt will demo improved trunk mobility by at least 25% for all deficit motions to be able to transition and turn without limitation - in progress  3. Pt will demo improved core and pelvic strength for improved stability with ambulation - in progress  4. Pt will demo improved breathing mecahnics with good diaphragmatic and lateral costal expansion to facilitate best 3D alignment - in progress  5.  Pt will demo improved posture awareness, requiring cueing <25% of the time to allow for independent management of symptoms - in progress     Date: 5/24/23  Tx #: 2 Date: 6/8/23  Tx#:3 Date:6/14/23  Tx#: 4   Therapeutic exercises Nustep lvl [id number] UEs/LEs       Nustep lvl [id number] UEs/LEs    Seated TrA    Seated TrA march 10x R/L    Seated LAQ 10x R/L    Seated hip add with ball [id number] Nustep lvl [id number] UEs/LEs    Seated TrA    Seated TrA march 10x R/L    Seated LAQ 10x R/L    Seated hip abd GreenTB 10x self resistance hold   Manual Therapy Rsidely: STM thoracolumbar paraspinals focus on L lumbar Rsidely: [facility] thoracolumbar paraspinals focus on L lumbar; assessed glutes and R lumbar Seated at table: STM B thoracolumbar paraspinals, QLs   Neuromuscular Re-education Seated diaphragmatic breathing with self palpation   - with mirror cueing   - with TrA activation on exhalation Seated diaphragmatic breathing with self palpation   - with TrA activation on exhalation   - with cervical flex/ext cueing Seated diaphragmatic breathing with self palpation   - with TrA activation on exhalation   - with postural maintenance on exhalation   TherAct Reviewed and discussed CT imaging of lumbar spine    Reviewed and discussed importance of proper diaphragmatic function Reviewed previous HEP and instructed pt on appropriate ex's. Discussed 3D presentation of scoliosis, impact on discs, nerves and muscle imbalances as applies to pt's individual curve     Current HEP:   5/17: Patient was instructed in and issued a HEP for: diaphragmatic breathing  5/24: TrA draw in on exhalation; added focus on self elongation with breathing  6/8: seated march, laq, hip add    Plan: Continue with manual interventions as indicated. Review breathing mechanics. May initiate gentle elongation work at stall bar. Initiate core and hip strengthening. Charges:  Man x1 (10mins), Neuro Re-ed x1 (11mins), TherAct x0 (5mins), TherEx x1 (17 mins)       Total Timed Treatment: 43 min  Total Treatment Time: 43 min

## 2023-06-21 ENCOUNTER — APPOINTMENT (OUTPATIENT)
Dept: PHYSICAL THERAPY | Age: 78
End: 2023-06-21
Attending: NURSE PRACTITIONER
Payer: MEDICARE

## 2023-06-21 ENCOUNTER — TELEPHONE (OUTPATIENT)
Dept: PHYSICAL THERAPY | Facility: HOSPITAL | Age: 78
End: 2023-06-21

## 2023-06-28 ENCOUNTER — APPOINTMENT (OUTPATIENT)
Dept: PHYSICAL THERAPY | Age: 78
End: 2023-06-28
Attending: NURSE PRACTITIONER
Payer: MEDICARE

## 2023-07-05 ENCOUNTER — APPOINTMENT (OUTPATIENT)
Dept: PHYSICAL THERAPY | Age: 78
End: 2023-07-05
Attending: NURSE PRACTITIONER
Payer: MEDICARE

## 2023-07-12 ENCOUNTER — APPOINTMENT (OUTPATIENT)
Dept: PHYSICAL THERAPY | Age: 78
End: 2023-07-12
Attending: NURSE PRACTITIONER
Payer: MEDICARE

## 2023-07-17 ENCOUNTER — TELEPHONE (OUTPATIENT)
Dept: PHYSICAL THERAPY | Facility: HOSPITAL | Age: 78
End: 2023-07-17

## 2023-07-19 ENCOUNTER — APPOINTMENT (OUTPATIENT)
Dept: PHYSICAL THERAPY | Age: 78
End: 2023-07-19
Attending: NURSE PRACTITIONER
Payer: MEDICARE

## 2023-07-26 ENCOUNTER — APPOINTMENT (OUTPATIENT)
Dept: PHYSICAL THERAPY | Age: 78
End: 2023-07-26
Attending: NURSE PRACTITIONER
Payer: MEDICARE

## 2023-07-28 ENCOUNTER — TELEPHONE (OUTPATIENT)
Dept: NEUROSURGERY | Age: 78
End: 2023-07-28

## 2023-07-28 ENCOUNTER — ORDER TRANSCRIPTION (OUTPATIENT)
Dept: PHYSICAL THERAPY | Facility: HOSPITAL | Age: 78
End: 2023-07-28

## 2023-07-28 DIAGNOSIS — M54.50 LUMBAR PAIN: Primary | ICD-10-CM

## 2023-07-28 DIAGNOSIS — M41.9 SCOLIOSIS: ICD-10-CM

## 2023-08-02 ENCOUNTER — TELEPHONE (OUTPATIENT)
Dept: PHYSICAL THERAPY | Facility: HOSPITAL | Age: 78
End: 2023-08-02

## 2023-08-03 ENCOUNTER — TELEPHONE (OUTPATIENT)
Dept: PHYSICAL THERAPY | Facility: HOSPITAL | Age: 78
End: 2023-08-03

## 2023-08-23 ENCOUNTER — TELEPHONE (OUTPATIENT)
Dept: PHYSICAL THERAPY | Facility: HOSPITAL | Age: 78
End: 2023-08-23

## 2023-08-28 ENCOUNTER — APPOINTMENT (OUTPATIENT)
Dept: PHYSICAL THERAPY | Age: 78
End: 2023-08-28
Attending: NURSE PRACTITIONER
Payer: MEDICARE

## 2023-09-06 ENCOUNTER — APPOINTMENT (OUTPATIENT)
Dept: PHYSICAL THERAPY | Age: 78
End: 2023-09-06
Attending: NURSE PRACTITIONER
Payer: MEDICARE

## 2023-09-08 ENCOUNTER — TELEPHONE (OUTPATIENT)
Dept: PHYSICAL THERAPY | Facility: HOSPITAL | Age: 78
End: 2023-09-08

## 2023-09-08 NOTE — TELEPHONE ENCOUNTER
Returned call to pt who was asking for referral for other PT's. Discussed her need for Sch[facility] and advised pt to continue her exercises and wait to see Alex when she returns.

## 2023-09-12 ENCOUNTER — APPOINTMENT (OUTPATIENT)
Dept: PHYSICAL THERAPY | Age: 78
End: 2023-09-12
Attending: NURSE PRACTITIONER
Payer: MEDICARE

## 2023-09-13 ENCOUNTER — TELEPHONE (OUTPATIENT)
Dept: PHYSICAL THERAPY | Facility: HOSPITAL | Age: 78
End: 2023-09-13

## 2023-09-18 ENCOUNTER — TELEPHONE (OUTPATIENT)
Dept: PHYSICAL THERAPY | Facility: HOSPITAL | Age: 78
End: 2023-09-18

## 2023-09-19 ENCOUNTER — APPOINTMENT (OUTPATIENT)
Dept: PHYSICAL THERAPY | Age: 78
End: 2023-09-19
Attending: NURSE PRACTITIONER
Payer: MEDICARE

## 2023-09-25 ENCOUNTER — TELEPHONE (OUTPATIENT)
Dept: PHYSICAL THERAPY | Facility: HOSPITAL | Age: 78
End: 2023-09-25

## 2023-09-26 ENCOUNTER — TELEPHONE (OUTPATIENT)
Dept: PHYSICAL THERAPY | Facility: HOSPITAL | Age: 78
End: 2023-09-26

## 2023-09-26 ENCOUNTER — APPOINTMENT (OUTPATIENT)
Dept: PHYSICAL THERAPY | Age: 78
End: 2023-09-26
Attending: NURSE PRACTITIONER
Payer: MEDICARE

## 2023-10-05 ENCOUNTER — TELEPHONE (OUTPATIENT)
Dept: PHYSICAL THERAPY | Facility: HOSPITAL | Age: 78
End: 2023-10-05

## 2023-10-05 NOTE — TELEPHONE ENCOUNTER
I spoke with pt and informed her that Derrick is able to treat spine but she is not Schro[facility] certified. I let pt know that she may still be scheduled with Alex at [facility], but pt declined due to the distance. She reports being unable to drive long distances. She confirmed that she will keep her appts at [place]. I let the pt know that I will send an inbasket for Sravan and Alex to discuss her status and that we may utilize [facility] as a resource as needed. She reports being pleased with this plan.

## 2023-10-10 ENCOUNTER — OFFICE VISIT (OUTPATIENT)
Dept: PHYSICAL THERAPY | Age: 78
End: 2023-10-10
Attending: NURSE PRACTITIONER
Payer: MEDICARE

## 2023-10-10 DIAGNOSIS — M41.9 SCOLIOSIS: ICD-10-CM

## 2023-10-10 DIAGNOSIS — M54.50 LUMBAR PAIN: Primary | ICD-10-CM

## 2023-10-10 PROCEDURE — 97162 PT EVAL MOD COMPLEX 30 MIN: CPT | Performed by: PHYSICAL THERAPIST

## 2023-10-10 PROCEDURE — 97140 MANUAL THERAPY 1/> REGIONS: CPT | Performed by: PHYSICAL THERAPIST

## 2023-10-17 ENCOUNTER — OFFICE VISIT (OUTPATIENT)
Dept: PHYSICAL THERAPY | Age: 78
End: 2023-10-17
Attending: NURSE PRACTITIONER
Payer: MEDICARE

## 2023-10-17 PROCEDURE — 97140 MANUAL THERAPY 1/> REGIONS: CPT | Performed by: PHYSICAL THERAPIST

## 2023-10-17 PROCEDURE — 97530 THERAPEUTIC ACTIVITIES: CPT | Performed by: PHYSICAL THERAPIST

## 2023-10-17 NOTE — PROGRESS NOTES
Diagnosis:    Lumbar pain (M54.50)  Scoliosis (M41.9)       Referring Provider: Uriel Castro  Date of Evaluation:    10/10/23    Precautions:  None Next MD visit:   none scheduled  Date of Surgery: n/a   Insurance Primary/Secondary: MEDICARE / David Sandoval     # Auth Visits: NA            Subjective: Pt has increased pain due to the weather. Pain: 7/10      Objective:   Lumar AROM: (* denotes performed with pain)  Flexion: Sev limitations with pain  Extension: Sev limitations with pain  Sidebending: R 30%*; L 30%*  Rotation: R 10%*; L 10%*         Assessment: Patient had increased muscle tension on the L>R lower lumbar paraspinals and QL. Pt fatigued after LAQ. Patient required frequent cueing for diaphragmatic breathing. Goals:   (to be met in 10 visits)   Patient to be independent with HEP. Patient to be able to stand for 5 minutes while doing household activities  Patient to increase B hip strength = 4-/5 to allow patient to amb within the home without c/o pain for 5 minutes  Patient to increase score on Oswestry by >9 points to show improvement in QOL  Patient to have an overall subjective improvement of 70% or greater. Patient to be able to demonstrate and verbalize an upright posture       Plan: Cont with POC  Date: 10/17/2023  TX#: 2/10 Date:                 TX#: 3/ Date:                 TX#: 4/ Date:                 TX#: 5/ Date:    Tx#: 6/   Manual:  STM Lower Back and QL B       TherAct:  LAQ 2x10B  Diaphragmatic breathing review                       HEP: Diaphragmatic breathing    Charges: 1 Manual  x 20 min, 2 TherAct x25min      Total Timed Treatment: 45 min  Total Treatment Time: 45 min

## 2023-10-26 ENCOUNTER — OFFICE VISIT (OUTPATIENT)
Dept: PHYSICAL THERAPY | Age: 78
End: 2023-10-26
Attending: NURSE PRACTITIONER

## 2023-10-26 PROCEDURE — 97140 MANUAL THERAPY 1/> REGIONS: CPT | Performed by: PHYSICAL THERAPIST

## 2023-10-26 PROCEDURE — 97530 THERAPEUTIC ACTIVITIES: CPT | Performed by: PHYSICAL THERAPIST

## 2023-10-26 NOTE — PROGRESS NOTES
Diagnosis:    Lumbar pain (M54.50)  Scoliosis (M41.9)       Referring Provider: Joe Hernandez  Date of Evaluation:    10/10/23    Precautions:  None Next MD visit:   none scheduled  Date of Surgery: n/a   Insurance Primary/Secondary: MEDICARE / [address] [place] [address]     # Auth Visits: NA            Subjective: Pt still is waiting on her regular pain medication. Pt almost rescheduled today. She w    Pain: 7/10      Objective:   Lumar AROM: (* denotes performed with pain)  Flexion: Sev limitations with pain  Extension: Sev limitations with pain  Sidebending: R 30%*; L 30%*  Rotation: R 10%*; L 10%*         Assessment: Pt       Goals:   (to be met in 10 visits)   Patient to be independent with HEP. Patient to be able to stand for 5 minutes while doing household activities  Patient to increase B hip strength = 4-/5 to allow patient to amb within the home without c/o pain for 5 minutes  Patient to increase score on Oswestry by >9 points to show improvement in QOL  Patient to have an overall subjective improvement of 70% or greater. Patient to be able to demonstrate and verbalize an upright posture       Plan: Try standing exercises if patient's pain is under control. Date: 10/17/2023  TX#: 2/10 Date: 10/26/23                TX#: 3/10 Date:                 TX#: 4/ Date:                 TX#: 5/ Date:    Tx#: 6/   Manual:  STM Lower Back and QL B Manual:  STM Lower Back and QL B      TherAct:  LAQ 2x10B  Diaphragmatic breathing review   TherAct:  LAQ 2x10B  Diaphragmatic breathing review  Seated march without leaning on chair x10                      HEP: Diaphragmatic breathing    Charges: 2 Manual  x 25 min, 1 TherAct x15min      Total Timed Treatment: 40 min  Total Treatment Time: 40 min

## 2023-10-31 ENCOUNTER — OFFICE VISIT (OUTPATIENT)
Dept: PHYSICAL THERAPY | Age: 78
End: 2023-10-31
Attending: NURSE PRACTITIONER

## 2023-10-31 PROCEDURE — 97530 THERAPEUTIC ACTIVITIES: CPT | Performed by: PHYSICAL THERAPIST

## 2023-10-31 PROCEDURE — 97140 MANUAL THERAPY 1/> REGIONS: CPT | Performed by: PHYSICAL THERAPIST

## 2023-10-31 NOTE — PROGRESS NOTES
Diagnosis:    Lumbar pain (M54.50)  Scoliosis (M41.9)       Referring Provider: Damián Bach  Date of Evaluation:    10/10/23    Precautions:  None Next MD visit:   none scheduled  Date of Surgery: n/a   Insurance Primary/Secondary: MEDICARE / [address] [place] [address]     # Auth Visits: NA            Subjective: Pt felt good for 3 days after treatment. Pt received her pain medication. She will see her doctor next month since her pain isn't gettig too much relief with the medication. Pt said her vertigo has been coming back, but the doctor's office is aware. Pt is taking antivert to help. Pain: 8/10 with medication      Objective:   Lumar AROM: (* denotes performed with pain)  Flexion: Sev limitations with pain  Extension: Sev limitations with pain  Sidebending: R 30%*; L 30%*  Rotation: R 10%*; L 10%*         Assessment: Pt requires cueing for certain muscle activation. Still has high pain ratings with increases in pain medication. Pt requires frequent rest breaks with exercises. Goals:   (to be met in 10 visits)   Patient to be independent with HEP. Patient to be able to stand for 5 minutes while doing household activities  Patient to increase B hip strength = 4-/5 to allow patient to amb within the home without c/o pain for 5 minutes  Patient to increase score on Oswestry by >9 points to show improvement in QOL  Patient to have an overall subjective improvement of 70% or greater. Patient to be able to demonstrate and verbalize an upright posture       Plan: Try standing exercises if patient's pain is under control. Date: 10/17/2023  TX#: 2/10 Date: 10/26/23                TX#: 3/10 Date:  10/31/23               TX#: 4/10 Date:                 TX#: 5/10 Date:    Tx#: 6/   Manual:  STM Lower Back and QL B Manual:  STM Lower Back and QL B Manual:  STM Lower Back and QL B Manual:  STM Lower Back and QL B    TherAct:  LAQ 2x10B  Diaphragmatic breathing review   TherAct:  LAQ 2x10B  Diaphragmatic breathing review  Seated march without leaning on chair x10   TherAct:  LAQ 2x10B  Diaphragmatic breathing review  Seated march without leaning on chair x10 TherAct:  Seated TrA x10  Standing March x10  Standing heel raises x10  LAQ 2x10B                  HEP: Diaphragmatic breathing    Charges: 1 Manual x15min min, 2 TherAct x25min      Total Timed Treatment: 40 min  Total Treatment Time: 40 min

## 2023-11-07 ENCOUNTER — TELEPHONE (OUTPATIENT)
Dept: PHYSICAL THERAPY | Facility: HOSPITAL | Age: 78
End: 2023-11-07

## 2023-11-07 ENCOUNTER — APPOINTMENT (OUTPATIENT)
Dept: PHYSICAL THERAPY | Age: 78
End: 2023-11-07
Attending: NURSE PRACTITIONER
Payer: MEDICARE

## 2023-11-14 ENCOUNTER — OFFICE VISIT (OUTPATIENT)
Dept: PHYSICAL THERAPY | Age: 78
End: 2023-11-14
Attending: NURSE PRACTITIONER
Payer: MEDICARE

## 2023-11-14 PROCEDURE — 97530 THERAPEUTIC ACTIVITIES: CPT | Performed by: PHYSICAL THERAPIST

## 2023-11-14 NOTE — PROGRESS NOTES
Diagnosis:    Lumbar pain (M54.50)  Scoliosis (M41.9)       Referring Provider: Barby Castelan  Date of Evaluation:    10/10/23    Precautions:  None Next MD visit:   none scheduled  Date of Surgery: n/a   Insurance Primary/Secondary: MEDICARE / Ebony Holden     # Auth Visits: NA            Subjective: Pt states that therapy helps with her pain for the day of and the following day, but then pain goes up again. Pt overall feels 20% better. Pt unable to lay supine at home. Pt is worried about going to her dentist because she cannot lay supine. Pt has difficulty standing/walking more than going from her kitchen to her living room. She performs her housework carefully, but increases her pain. She uses a wheelchair when she comes into the clinic for therapy due to pain. Patient states pain feels like sharp, shooting pain from her L waist to the L buttocks. Pt started to feel vertigo like symptoms, but thinks it is because of the norco.    Pain: Current: 7/10; Best: 7/10 with pain medication + pain patch; Worst: 10/10 with activity      Objective:   Lumar AROM (standing):   Flexion  20%  Ext to neutral with pain  Rotation : 40%L with pain, 60% R  Side bend : 50% L with pain, 50% R    Hip Strength (standing):  Hip Flex 3/5 B  Hip Abd 3/5 B  Hip Ext 3/5 B      Assessment: Pt has        Goals:   (to be met in 10 visits)   Patient to be independent with HEP. - MET   Patient to be able to stand for 5 minutes while doing household activities - IN PROGRESS  Patient to increase B hip strength = 4-/5 to allow patient to amb within the home without c/o pain for 5 minutes - NOT MET  Patient to increase score on Oswestry by >9 points to show improvement in QOL  Patient to have an overall subjective improvement of 70% or greater. - IN PROGRESS  Patient to be able to demonstrate and verbalize an upright posture - MET       Plan: Try standing exercises if patient's pain is under control.    Date: 10/17/2023  TX#: 2/10 Date: 10/26/23 TX#: 3/10 Date:  10/31/23               TX#: 4/10 Date:                 TX#: 5/10 Date: 11/14/23  Tx#: 6/10   Manual:  STM Lower Back and QL B Manual:  STM Lower Back and QL B Manual:  STM Lower Back and QL B Manual:  STM Lower Back and QL B    TherAct:  LAQ 2x10B  Diaphragmatic breathing review   TherAct:  LAQ 2x10B  Diaphragmatic breathing review  Seated march without leaning on chair x10   TherAct:  LAQ 2x10B  Diaphragmatic breathing review  Seated march without leaning on chair x10 TherAct:  Seated TrA x10  Standing March x10  Standing heel raises x10  LAQ 2x10B TherAct:  Re-assessment completed  Seated holding onto 0.5# rotation x10B  Seated LAQ/March x10  Standing knee flexion x10B                 HEP: Diaphragmatic breathing    Charges: 1 Manual x15min min, 2 TherAct x25min      Total Timed Treatment: 40 min  Total Treatment Time: 40 min    Post Oswestry Disability Index Score  Post Score: 36 % (11/14/2023  1:48 PM)    24 % improvement    Plan: Continue skilled Physical Therapy 2 x/week or a total of 4 visits over a 90 day period. Treatment will include: Manual prn, theract, therex, neuroed. Patient/Family/Caregiver was advised of these findings, precautions, and treatment options and has agreed to actively participate in planning and for this course of care. Thank you for your referral. If you have any questions, please contact me at Dept: [phone]. Sincerely,  Electronically signed by therapist: Barrett Meza PT     Physician's certification required:  Yes  Please co-sign or sign and return this letter via fax as soon as possible to [phone]. I certify the need for these services furnished under this plan of treatment and while under my care.     X___________________________________________________ Date____________________    Certification From: 63/16/0291  To:2/12/2024

## 2023-11-16 ENCOUNTER — EXTERNAL RECORD (OUTPATIENT)
Dept: HEALTH INFORMATION MANAGEMENT | Facility: OTHER | Age: 78
End: 2023-11-16

## 2023-12-05 ENCOUNTER — TELEPHONE (OUTPATIENT)
Dept: PHYSICAL THERAPY | Facility: HOSPITAL | Age: 78
End: 2023-12-05

## 2023-12-13 ENCOUNTER — OFFICE VISIT (OUTPATIENT)
Dept: PHYSICAL THERAPY | Age: 78
End: 2023-12-13
Attending: NURSE PRACTITIONER
Payer: MEDICARE

## 2023-12-13 PROCEDURE — 97110 THERAPEUTIC EXERCISES: CPT | Performed by: PHYSICAL THERAPIST

## 2023-12-13 PROCEDURE — 97140 MANUAL THERAPY 1/> REGIONS: CPT | Performed by: PHYSICAL THERAPIST

## 2023-12-13 NOTE — PROGRESS NOTES
Diagnosis:    Lumbar pain (M54.50)  Scoliosis (M41.9)       Referring Provider: Judyth Kocher  Date of Evaluation:    10/10/23    Precautions:  None Next MD visit:   none scheduled  Date of Surgery: n/a   Insurance Primary/Secondary: MEDICARE / [address] [place] [address]     # Auth Visits: NA            Subjective: Pt saw the physician last week. Pt said the doctor said an MRI wasn't needed because they wouldn't do surgery at her age. No change in pain. Pain: Current: 7/10; Best: 7/10 with pain medication + pain patch; Worst: 10/10 with activity      Objective:  See outpatient daily record      Assessment: Pt has increased muscle tension B QL, and thoracolumbar paraspinals. Spoke with patient about the importance of maintaining movement. Pt verbalized understanding. Pt in agreement with progressing towards discharge with HEP. She will look for a massage therapist.         Goals:   (to be met in 10 visits)   Patient to be independent with HEP. - MET   Patient to be able to stand for 5 minutes while doing household activities - IN PROGRESS  Patient to increase B hip strength = 4-/5 to allow patient to amb within the home without c/o pain for 5 minutes - NOT MET  Patient to increase score on Oswestry by >9 points to show improvement in QOL  Patient to have an overall subjective improvement of 70% or greater.  - IN PROGRESS  Patient to be able to demonstrate and verbalize an upright posture - MET       Plan: Continue with STM/LE Strengthening  Date: 10/17/2023  TX#: 2/10 Date: 10/26/23                TX#: 3/10 Date:  10/31/23               TX#: 4/10 Date:                 TX#: 5/10 Date: 11/14/23  Tx#: 6/10 Date: 12/13/23  Tx#: 7/10   Manual:  STM Lower Back and QL B Manual:  STM Lower Back and QL B Manual:  STM Lower Back and QL B Manual:  STM Lower Back and QL B  Manual:  STM Lower Back and QL B   TherAct:  LAQ 2x10B  Diaphragmatic breathing review   TherAct:  LAQ 2x10B  Diaphragmatic breathing review  Seated march without leaning on chair x10   TherAct:  LAQ 2x10B  Diaphragmatic breathing review  Seated march without leaning on chair x10 TherAct:  Seated TrA x10  Standing March x10  Standing heel raises x10  LAQ 2x10B TherAct:  Re-assessment completed  Seated holding onto 0.5# rotation x10B  Seated LAQ/March x10  Standing knee flexion x10B TherEx:  Standing March x15  LAQ 2x10B  Heel raises x10                   HEP: Diaphragmatic breathing    Charges: 2 Manual x25min min, 1 TherEx x15min      Total Timed Treatment: 40 min  Total Treatment Time: 40 min

## 2023-12-21 ENCOUNTER — APPOINTMENT (OUTPATIENT)
Dept: PHYSICAL THERAPY | Age: 78
End: 2023-12-21
Attending: NURSE PRACTITIONER
Payer: MEDICARE

## 2023-12-21 ENCOUNTER — TELEPHONE (OUTPATIENT)
Dept: PHYSICAL THERAPY | Age: 78
End: 2023-12-21

## 2024-01-09 ENCOUNTER — OFFICE VISIT (OUTPATIENT)
Dept: PHYSICAL THERAPY | Age: 79
End: 2024-01-09
Attending: NURSE PRACTITIONER
Payer: MEDICARE

## 2024-01-09 PROCEDURE — 97140 MANUAL THERAPY 1/> REGIONS: CPT | Performed by: PHYSICAL THERAPIST

## 2024-01-09 PROCEDURE — 97110 THERAPEUTIC EXERCISES: CPT | Performed by: PHYSICAL THERAPIST

## 2024-01-09 NOTE — PROGRESS NOTES
Diagnosis:    Lumbar pain (M54.50)  Scoliosis (M41.9)       Referring Provider: Charles  Date of Evaluation:    10/10/23    Precautions:  None Next MD visit:   none scheduled  Date of Surgery: n/a   Insurance Primary/Secondary: MEDICARE / [place] INDEMNITY     # Auth Visits: NA            Subjective: Pt saw the physician last week. Pt said the doctor said an MRI wasn't needed because they wouldn't do surgery at her age. No change in pain.     Pain: Current: 7/10; Best: 7/10 with pain medication + pain patch; Worst: 10/10 with activity      Objective:  See outpatient daily record      Assessment: Emphasized importance of strengthening to help patient be able to stand longer with less pain and feelings of fatigue. Pt verbalized understanding. Will start to lessen manual work and focus on strengthening for the last 3 sessions. Pt has muscle tension R lower back upon palpation.        Goals:   (to be met in 10 visits)   Patient to be independent with HEP. - MET   Patient to be able to stand for 5 minutes while doing household activities - IN PROGRESS  Patient to increase B hip strength = 4-/5 to allow patient to amb within the home without c/o pain for 5 minutes - NOT MET  Patient to increase score on Oswestry by >9 points to show improvement in QOL  Patient to have an overall subjective improvement of 70% or greater. - IN PROGRESS  Patient to be able to demonstrate and verbalize an upright posture - MET       Plan: Continue with STM/LE Strengthening. Issue additional HEP packets.  Date:  10/31/23               TX#: 4/10 Date:                 TX#: 5/10 Date: 11/14/23  Tx#: 6/10 Date: 12/13/23  Tx#: 7/10 Date: 1/9/24  Tx#: 8/10   Manual:  STM Lower Back and QL B Manual:  STM Lower Back and QL B  Manual:  STM Lower Back and QL B Manual:   STM Lower Back and QL B   TherAct:  LAQ 2x10B  Diaphragmatic breathing review  Seated march without leaning on chair x10 TherAct:  Seated TrA x10  Standing March x10  Standing heel  raises x10  LAQ 2x10B TherAct:  Re-assessment completed  Seated holding onto 0.5# rotation x10B  Seated LAQ/March x10  Standing knee flexion x10B TherEx:  Standing March x15  LAQ 2x10B  Heel raises x10 TherEx:  Hip Ext x10 B standing  Standing Hip abd x10  Standing March x10  Heel raises x10                 HEP: Diaphragmatic breathing  1/9/23: Standing Hip abd, ext, flex; Heel raises     Charges: 1 Manual x15min min, 2 TherEx 25xmin      Total Timed Treatment: 40 min  Total Treatment Time: 40 min

## 2024-03-12 ENCOUNTER — TELEPHONE (OUTPATIENT)
Dept: PHYSICAL THERAPY | Facility: HOSPITAL | Age: 79
End: 2024-03-12

## 2024-04-09 ENCOUNTER — APPOINTMENT (OUTPATIENT)
Dept: PHYSICAL THERAPY | Age: 79
End: 2024-04-09
Attending: NURSE PRACTITIONER
Payer: MEDICARE

## 2024-04-09 ENCOUNTER — TELEPHONE (OUTPATIENT)
Dept: PHYSICAL THERAPY | Age: 79
End: 2024-04-09

## 2024-04-10 ENCOUNTER — APPOINTMENT (OUTPATIENT)
Dept: PHYSICAL THERAPY | Age: 79
End: 2024-04-10
Attending: NURSE PRACTITIONER
Payer: MEDICARE

## 2024-04-16 ENCOUNTER — OFFICE VISIT (OUTPATIENT)
Dept: PHYSICAL THERAPY | Age: 79
End: 2024-04-16
Attending: NURSE PRACTITIONER
Payer: MEDICARE

## 2024-04-16 PROCEDURE — 97140 MANUAL THERAPY 1/> REGIONS: CPT | Performed by: PHYSICAL THERAPIST

## 2024-04-16 PROCEDURE — 97110 THERAPEUTIC EXERCISES: CPT | Performed by: PHYSICAL THERAPIST

## 2024-04-16 NOTE — PROGRESS NOTES
Diagnosis:    Lumbar pain (M54.50)  Scoliosis (M41.9)       Referring Provider: Charles  Date of Evaluation:    10/10/23    Precautions:  None Next MD visit:   none scheduled  Date of Surgery: n/a   Insurance Primary/Secondary: MEDICARE / [place]DEMN[place]     # Auth Visits: NA           Progress Summary  Pt has attended 9 visits in Physical Therapy.      Subjective: Pt has not been to therapy in about 2 months because of the cold weather. She has not been doing exercises at home, and feels like her lower extremities have been weaker. She feels like she can stand for less time, and walk for less time due to weakness. Her legs would feel shaky. When she would do the exercises, she would feel better. She would like to continue to work on her strengthening since it was making a difference. She would like to try a few more sessions of therapy. Her pain continues straight across her waist, her pain has is now lower. Denies incontinence and saddle like paraesthesia. Patient states she has been sitting more than usual.    Pain: Current: 5/10 with pain medication; Best: 5/10; Worst: 10/10       Objective:  See outpatient daily record    Strength:    Seated Knee Ext: 3/5 B  Seated Knee Flex: 3/5 B  Standing Hip Flex: 3-/5 B  Standing Hip Abd: 3/5 B  Standing Hip Ext: 3-/5 B with pain     Palpation: Tenderness and pain B lumbar paraspinals, QL L>R    Standing posture: Patient stands with forward flexed posture, B knees slightly flexed.     Gait: Patient ambulates with a forward flexed posture, decreased arm swing, decreased aislinn, decreased hip ext B    Assessment: Emphasized importance of strengthening to help patient be able to stand longer with less pain and feelings of fatigue. Pt verbalized understanding. Patient has regressed with standing and walking tolerance. Showing increased area of pain as well, but pain numbers have stayed around the same. Patient has been sitting more which could be contributing to  increased pain. Pt hasn't had a follow up with referring provider, and will be making one. Pt continues to demonstrate weakness in B LE, and would benefit from stretching and strengthening to help prevent injury at home and to decrease pain.         Goals:   (to be met in 10 visits)   Patient to be independent with HEP. - Not Met  Patient to be able to stand for 5 minutes while doing household activities - In progress  Patient to increase B hip strength = 4-/5 to allow patient to amb within the home without c/o pain for 5 minutes - Not Met   Patient to increase score on Oswestry by >9 points to show improvement in QOL  Patient to have an overall subjective improvement of 70% or greater. - In progress  Patient to be able to demonstrate and verbalize an upright posture - Met       Plan: Continue with STM/LE Strengthening. Issue additional HEP packets.  Date: 12/13/23  Tx#: 7/10 Date: 1/9/24  Tx#: 8/10 Date: 4/16/24  Tx#: 9/10   Manual:  STM Lower Back and QL B Manual:   STM Lower Back and QL B Manual:  STM Lower Back and QL B   TherEx:  Standing March x15  LAQ 2x10B  Heel raises x10 TherEx:  Hip Ext x10 B standing  Standing Hip abd x10  Standing March x10  Heel raises x10 TherEx:  Seated QL stretch L 10 sec hold x2  Seated heel toe raises x15  Seated LAQ x10 B  Seated march x10 B  Re-assessment completed               HEP: Diaphragmatic breathing  1/9/23: Standing Hip abd, ext, flex; Heel raises     Charges: 1 Manual x10min min, 2 TherEx 30xmin      Total Timed Treatment: 40 min  Total Treatment Time: 40 min  Post Oswestry Disability Index Score  Post Score: 36 % (11/14/2023  1:48 PM)    24 % improvement    Plan: Continue skilled Physical Therapy 2 x/week or a total of 8 visits over a 90 day period. Treatment will include: TherEx, TherAct, NeuroEd, Manual Prn, Gait training, balance training.        Patient/Family/Caregiver was advised of these findings, precautions, and treatment options and has agreed to actively  participate in planning and for this course of care.    Thank you for your referral. If you have any questions, please contact me at Dept: [phone].    Sincerely,  Electronically signed by therapist: Sravan Pascual PT     Physician's certification required:  Yes  Please co-sign or sign and return this letter via fax as soon as possible to [phone].   I certify the need for these services furnished under this plan of treatment and while under my care.    X___________________________________________________ Date____________________    Certification From: 4/16/2024  To:7/15/2024

## 2024-05-07 ENCOUNTER — TELEPHONE (OUTPATIENT)
Dept: NEUROSURGERY | Age: 79
End: 2024-05-07

## 2024-10-12 NOTE — PLAN OF CARE
Problem: GASTROINTESTINAL - ADULT  Goal: Maintains adequate nutritional intake (undernourished)  INTERVENTIONS:  - Monitor percentage of each meal consumed  - Identify factors contributing to decreased intake, treat as appropriate  - Assist with meals as n
Knee Immobilizer

## 2024-11-06 ENCOUNTER — HOSPITAL ENCOUNTER (EMERGENCY)
Facility: HOSPITAL | Age: 79
Discharge: HOME | End: 2024-11-06
Attending: EMERGENCY MEDICINE
Payer: MEDICARE

## 2024-11-06 VITALS
SYSTOLIC BLOOD PRESSURE: 103 MMHG | RESPIRATION RATE: 16 BRPM | TEMPERATURE: 98 F | HEART RATE: 82 BPM | DIASTOLIC BLOOD PRESSURE: 51 MMHG | OXYGEN SATURATION: 93 %

## 2024-11-06 DIAGNOSIS — M54.31 SCIATICA OF RIGHT SIDE: Primary | ICD-10-CM

## 2024-11-06 PROCEDURE — 96375 TX/PRO/DX INJ NEW DRUG ADDON: CPT

## 2024-11-06 PROCEDURE — 96374 THER/PROPH/DIAG INJ IV PUSH: CPT

## 2024-11-06 PROCEDURE — 99284 EMERGENCY DEPT VISIT MOD MDM: CPT

## 2024-11-06 RX ORDER — METHYLPREDNISOLONE 4 MG/1
TABLET ORAL
Qty: 1 EACH | Refills: 0 | Status: SHIPPED | OUTPATIENT
Start: 2024-11-06

## 2024-11-06 RX ORDER — DEXAMETHASONE SODIUM PHOSPHATE 10 MG/ML
6 INJECTION, SOLUTION INTRAMUSCULAR; INTRAVENOUS ONCE
Status: COMPLETED | OUTPATIENT
Start: 2024-11-06 | End: 2024-11-06

## 2024-11-06 RX ORDER — MORPHINE SULFATE 4 MG/ML
4 INJECTION, SOLUTION INTRAMUSCULAR; INTRAVENOUS ONCE
Status: COMPLETED | OUTPATIENT
Start: 2024-11-06 | End: 2024-11-06

## 2024-11-06 RX ORDER — KETOROLAC TROMETHAMINE 15 MG/ML
15 INJECTION, SOLUTION INTRAMUSCULAR; INTRAVENOUS ONCE
Status: COMPLETED | OUTPATIENT
Start: 2024-11-06 | End: 2024-11-06

## 2024-11-06 NOTE — CM/SW NOTE
Per ANDREZ, spoke with patient regarding home PT vs outpatient PT.    Patient stated that she would like to speak with her PCP Dr. Cote, associated with [facility] and discussed PT options with him.    EDCM informed patient that we can set up [facility] PT but she declined, again, stating that she wants to discuss with her PCP first.    PATRICKM offered an outpatient order for PT but patient declined, as above.    Patient's stated that her  can not pick her up so she stated RN told her she can go home in a [facility] free of charge.  EDCM informed her that [facility] is not free and is an out of pocket expense.    EDCM estimated around $65.00 (could be more)    Patient agreeable.    MD and RN updated.

## 2024-11-06 NOTE — ED INITIAL ASSESSMENT (HPI)
Pt arrives via EMS from home, pt c/o sciatica pain that got worse several days ago, dx one year ago.   + multiple falls, difficulty bearing weight  Pt denies relief with hydrocodone at home.   Pt currently wearing buprenorphine transdermal patch.

## 2024-11-06 NOTE — ED PROVIDER NOTES
Patient Seen in: [facility] Emergency Department    History     Chief Complaint   Patient presents with    Pain       HPI    History is provided by patient/independent historian: Patient, EMS  79 year old female with history of cervical spinal stenosis, on Norco and buprenorphine transdermal patch, here with worsening sciatica over the last few days.  She is not getting any relief by taking her Norco, but is still able to do all of her daily activities while using a cane.  No incontinence, fevers, history of IV drug use or cancer.  Patient reports the pain radiates down into the right leg.  No new trauma.  No urinary symptoms.    History reviewed.   Past Medical History:    Anxiety state    Back problem    Cervical spinal stenosis    Chronic fatigue syndrome    Dysphagia    Esophageal reflux    TIA (transient ischemic attack)     - no residual problems    Vertigo    Visual impairment    glasses         History reviewed.   Past Surgical History:   Procedure Laterality Date    Other      cervical fusion  with hardware          Home Medications reviewed :  Prescriptions Prior to Admission[1]      History reviewed.   Social History     Socioeconomic History    Marital status:    Tobacco Use    Smoking status: Former     Current packs/day: 0.00     Average packs/day: 0.5 packs/day for 10.0 years (5.0 ttl pk-yrs)     Types: Cigarettes     Start date: 2007     Quit date: 2017     Years since quittin.9    Smokeless tobacco: Never   Vaping Use    Vaping status: Never Used   Substance and Sexual Activity    Alcohol use: Yes     Comment: socially    Drug use: No         ROS  Review of Systems   Respiratory:  Negative for shortness of breath.    Cardiovascular:  Negative for chest pain.   Musculoskeletal:  Positive for back pain.   All other systems reviewed and are negative.     All other pertinent organ systems are reviewed and are negative.      Physical Exam     ED Triage Vitals   BP  11/06/24 1202 109/79   Pulse 11/06/24 1158 99   Resp 11/06/24 1158 16   Temp 11/06/24 1158 98.2 °F (36.8 °C)   Temp src 11/06/24 1158 Oral   SpO2 11/06/24 1158 98 %   O2 Device 11/06/24 1158 None (Room air)     Vital signs reviewed.      Physical Exam  Vitals and nursing note reviewed.   Cardiovascular:      Pulses: Normal pulses.   Pulmonary:      Effort: No respiratory distress.   Abdominal:      General: There is no distension.   Musculoskeletal:      Comments: No midline spinal tenderness, pelvis is stable, positive ipsilateral straight leg test of the right lower extremity   Neurological:      Mental Status: She is alert.         ED Course       Labs:     Labs Reviewed   RAINBOW DRAW LAVENDER   RAINBOW DRAW LIGHT GREEN   RAINBOW DRAW BLUE         My EKG Interpretation:   As reviewed and Interpreted by me      Imaging Results Available and Reviewed while in ED:   No results found.    Decision rules/scores evaluated: none      Diagnostic labs/tests considered but not ordered: lumbar XR, CBC, BMp, UA    ED Medications Administered:   Medications   ketorolac (Toradol) 15 MG/ML injection 15 mg (15 mg Intravenous Given 11/6/24 1211)   morphINE PF 4 MG/ML injection 4 mg (4 mg Intravenous Given 11/6/24 1212)   dexAMETHasone PF (Decadron) 10 MG/ML injection 6 mg (6 mg Intravenous Given 11/6/24 1235)            - pain improved - supportive care discussed    MDM       Medical Decision Making      Differential Diagnosis: After obtaining the patient's history, performing the physical exam and reviewing the diagnostics, multiple initial diagnoses were considered based on the presenting problem including sciatica, UTI, nephrolithiasis, shingles, neuropathic pain    External document review: I personally reviewed available external medical records for any recent pertinent discharge summaries, testing, and procedures - the findings are as follows: 5/28/24 visit with RODY Cote for HTN    Complicating Factors: The  patient already  has a past medical history of Anxiety state, Back problem, Cervical spinal stenosis, Chronic fatigue syndrome, Dysphagia, Esophageal reflux, TIA (transient ischemic attack), Vertigo, and Visual impairment. to contribute to the complexity of this ED evaluation.    Procedures performed: none    Discussed management with physician/appropriate source: none    Considered admission/deescalation of care for: none    Social determinants of health affecting patient care: none    Prescription medications considered: medrol dosepak, discussed continuing current medication regimen    The patient requires continuous monitoring for: back pain    Shared decision making: discussed possible admission          Disposition and Plan     Clinical Impression:  1. Sciatica of right side        Disposition:  Discharge    Follow-up:  Arely Cote  [address] [place] [id number]  [phone]    Follow up        Medications Prescribed:  Current Discharge Medication List        START taking these medications    Details   methylPREDNISolone (MEDROL) 4 MG Oral Tablet Therapy Pack Dosepack: take as directed  Qty: 1 each, Refills: 0                            [1] (Not in a hospital admission)

## 2024-11-06 NOTE — ED QUICK NOTES
Pt tolerated road test well with walker, with EDT. Pt states she has a walker at home. Pt concerned about being able to walk at home due to her R leg. This RN educated pt on use of steroids to decrease swelling.   This RN spoke with pt's , Allen, he was informed that pt refused home PT, Allen is requesting home PT and pt be sent home in medicar.

## 2025-02-15 ENCOUNTER — APPOINTMENT (OUTPATIENT)
Dept: GENERAL RADIOLOGY | Facility: HOSPITAL | Age: 80
End: 2025-02-15
Attending: EMERGENCY MEDICINE
Payer: MEDICARE

## 2025-02-15 ENCOUNTER — HOSPITAL ENCOUNTER (INPATIENT)
Facility: HOSPITAL | Age: 80
LOS: 4 days | Discharge: NURSING, REHAB OR LONG-TERM CARE | End: 2025-02-19
Attending: EMERGENCY MEDICINE | Admitting: INTERNAL MEDICINE
Payer: MEDICARE

## 2025-02-15 DIAGNOSIS — F98.8 ATTENTION DEFICIT DISORDER (ADD) WITHOUT HYPERACTIVITY: ICD-10-CM

## 2025-02-15 DIAGNOSIS — R13.10 DYSPHAGIA, UNSPECIFIED TYPE: ICD-10-CM

## 2025-02-15 DIAGNOSIS — S72.001A CLOSED DISPLACED FRACTURE OF RIGHT FEMORAL NECK (HCC): Primary | ICD-10-CM

## 2025-02-15 LAB
ALBUMIN SERPL-MCNC: 4.1 G/DL (ref 3.2–4.8)
ALBUMIN/GLOB SERPL: 1.9 {RATIO} (ref 1–2)
ALP LIVER SERPL-CCNC: 87 U/L
ALT SERPL-CCNC: 11 U/L
ANION GAP SERPL CALC-SCNC: 9 MMOL/L (ref 0–18)
AST SERPL-CCNC: 21 U/L (ref ?–34)
BASOPHILS # BLD AUTO: 0.09 X10(3) UL (ref 0–0.2)
BASOPHILS NFR BLD AUTO: 0.8 %
BILIRUB SERPL-MCNC: 0.6 MG/DL (ref 0.2–1.1)
BUN BLD-MCNC: 10 MG/DL (ref 9–23)
BUN/CREAT SERPL: 14.1 (ref 10–20)
CALCIUM BLD-MCNC: 9.2 MG/DL (ref 8.7–10.4)
CHLORIDE SERPL-SCNC: 107 MMOL/L (ref 98–112)
CO2 SERPL-SCNC: 26 MMOL/L (ref 21–32)
CREAT BLD-MCNC: 0.71 MG/DL
DEPRECATED RDW RBC AUTO: 42.9 FL (ref 35.1–46.3)
EGFRCR SERPLBLD CKD-EPI 2021: 86 ML/MIN/1.73M2 (ref 60–?)
EOSINOPHIL # BLD AUTO: 0.26 X10(3) UL (ref 0–0.7)
EOSINOPHIL NFR BLD AUTO: 2.4 %
ERYTHROCYTE [DISTWIDTH] IN BLOOD BY AUTOMATED COUNT: 12 % (ref 11–15)
GLOBULIN PLAS-MCNC: 2.2 G/DL (ref 2–3.5)
GLUCOSE BLD-MCNC: 104 MG/DL (ref 70–99)
HCT VFR BLD AUTO: 39.4 %
HGB BLD-MCNC: 13 G/DL
IMM GRANULOCYTES # BLD AUTO: 0.08 X10(3) UL (ref 0–1)
IMM GRANULOCYTES NFR BLD: 0.7 %
LYMPHOCYTES # BLD AUTO: 2.46 X10(3) UL (ref 1–4)
LYMPHOCYTES NFR BLD AUTO: 22.3 %
MCH RBC QN AUTO: 31.9 PG (ref 26–34)
MCHC RBC AUTO-ENTMCNC: 33 G/DL (ref 31–37)
MCV RBC AUTO: 96.6 FL
MONOCYTES # BLD AUTO: 0.68 X10(3) UL (ref 0.1–1)
MONOCYTES NFR BLD AUTO: 6.2 %
MRSA NASAL: NEGATIVE
NEUTROPHILS # BLD AUTO: 7.45 X10 (3) UL (ref 1.5–7.7)
NEUTROPHILS # BLD AUTO: 7.45 X10(3) UL (ref 1.5–7.7)
NEUTROPHILS NFR BLD AUTO: 67.6 %
OSMOLALITY SERPL CALC.SUM OF ELEC: 293 MOSM/KG (ref 275–295)
PLATELET # BLD AUTO: 342 10(3)UL (ref 150–450)
POTASSIUM SERPL-SCNC: 3.7 MMOL/L (ref 3.5–5.1)
PROT SERPL-MCNC: 6.3 G/DL (ref 5.7–8.2)
RBC # BLD AUTO: 4.08 X10(6)UL
SODIUM SERPL-SCNC: 142 MMOL/L (ref 136–145)
STAPH A BY PCR: NEGATIVE
WBC # BLD AUTO: 11 X10(3) UL (ref 4–11)

## 2025-02-15 PROCEDURE — 87641 MR-STAPH DNA AMP PROBE: CPT | Performed by: EMERGENCY MEDICINE

## 2025-02-15 PROCEDURE — 85025 COMPLETE CBC W/AUTO DIFF WBC: CPT | Performed by: EMERGENCY MEDICINE

## 2025-02-15 PROCEDURE — 73502 X-RAY EXAM HIP UNI 2-3 VIEWS: CPT | Performed by: EMERGENCY MEDICINE

## 2025-02-15 PROCEDURE — 80053 COMPREHEN METABOLIC PANEL: CPT | Performed by: EMERGENCY MEDICINE

## 2025-02-15 PROCEDURE — 99285 EMERGENCY DEPT VISIT HI MDM: CPT

## 2025-02-15 PROCEDURE — 96374 THER/PROPH/DIAG INJ IV PUSH: CPT

## 2025-02-15 PROCEDURE — 96376 TX/PRO/DX INJ SAME DRUG ADON: CPT

## 2025-02-15 PROCEDURE — 96361 HYDRATE IV INFUSION ADD-ON: CPT

## 2025-02-15 PROCEDURE — 96375 TX/PRO/DX INJ NEW DRUG ADDON: CPT

## 2025-02-15 PROCEDURE — 87640 STAPH A DNA AMP PROBE: CPT | Performed by: EMERGENCY MEDICINE

## 2025-02-15 RX ORDER — MORPHINE SULFATE 4 MG/ML
4 INJECTION, SOLUTION INTRAMUSCULAR; INTRAVENOUS ONCE
Status: COMPLETED | OUTPATIENT
Start: 2025-02-15 | End: 2025-02-15

## 2025-02-15 RX ORDER — DEXTROAMPHETAMINE SACCHARATE, AMPHETAMINE ASPARTATE, DEXTROAMPHETAMINE SULFATE AND AMPHETAMINE SULFATE 1.25; 1.25; 1.25; 1.25 MG/1; MG/1; MG/1; MG/1
5 TABLET ORAL 3 TIMES DAILY
Status: DISCONTINUED | OUTPATIENT
Start: 2025-02-15 | End: 2025-02-19

## 2025-02-15 RX ORDER — PANTOPRAZOLE SODIUM 40 MG/1
40 TABLET, DELAYED RELEASE ORAL
Status: DISCONTINUED | OUTPATIENT
Start: 2025-02-16 | End: 2025-02-19

## 2025-02-15 RX ORDER — LORAZEPAM 2 MG/ML
0.5 INJECTION INTRAMUSCULAR ONCE
Status: COMPLETED | OUTPATIENT
Start: 2025-02-15 | End: 2025-02-15

## 2025-02-15 RX ORDER — ONDANSETRON 2 MG/ML
4 INJECTION INTRAMUSCULAR; INTRAVENOUS ONCE
Status: COMPLETED | OUTPATIENT
Start: 2025-02-15 | End: 2025-02-15

## 2025-02-15 RX ORDER — HYDROMORPHONE HYDROCHLORIDE 1 MG/ML
0.8 INJECTION, SOLUTION INTRAMUSCULAR; INTRAVENOUS; SUBCUTANEOUS EVERY 2 HOUR PRN
Status: DISCONTINUED | OUTPATIENT
Start: 2025-02-15 | End: 2025-02-19

## 2025-02-15 RX ORDER — LORAZEPAM 0.5 MG/1
0.5 TABLET ORAL EVERY 6 HOURS PRN
Status: DISCONTINUED | OUTPATIENT
Start: 2025-02-15 | End: 2025-02-19

## 2025-02-15 RX ORDER — MECLIZINE HCL 12.5 MG 12.5 MG/1
12.5 TABLET ORAL 3 TIMES DAILY PRN
Status: DISCONTINUED | OUTPATIENT
Start: 2025-02-15 | End: 2025-02-19

## 2025-02-15 RX ORDER — SODIUM CHLORIDE 9 MG/ML
INJECTION, SOLUTION INTRAVENOUS CONTINUOUS
Status: DISCONTINUED | OUTPATIENT
Start: 2025-02-15 | End: 2025-02-18

## 2025-02-15 RX ORDER — HYDROMORPHONE HYDROCHLORIDE 1 MG/ML
0.4 INJECTION, SOLUTION INTRAMUSCULAR; INTRAVENOUS; SUBCUTANEOUS EVERY 2 HOUR PRN
Status: DISCONTINUED | OUTPATIENT
Start: 2025-02-15 | End: 2025-02-19

## 2025-02-15 RX ORDER — SODIUM CHLORIDE 9 MG/ML
1000 INJECTION, SOLUTION INTRAVENOUS ONCE
Status: COMPLETED | OUTPATIENT
Start: 2025-02-15 | End: 2025-02-15

## 2025-02-15 RX ORDER — HYDROMORPHONE HYDROCHLORIDE 1 MG/ML
0.2 INJECTION, SOLUTION INTRAMUSCULAR; INTRAVENOUS; SUBCUTANEOUS EVERY 2 HOUR PRN
Status: DISCONTINUED | OUTPATIENT
Start: 2025-02-15 | End: 2025-02-19

## 2025-02-15 RX ORDER — ACETAMINOPHEN 325 MG/1
650 TABLET ORAL EVERY 6 HOURS PRN
Status: DISCONTINUED | OUTPATIENT
Start: 2025-02-15 | End: 2025-02-19

## 2025-02-15 RX ORDER — HYDROCODONE BITARTRATE AND ACETAMINOPHEN 10; 325 MG/1; MG/1
1 TABLET ORAL EVERY 4 HOURS PRN
Status: DISCONTINUED | OUTPATIENT
Start: 2025-02-15 | End: 2025-02-16

## 2025-02-15 RX ORDER — SENNOSIDES 8.6 MG
8.6 TABLET ORAL NIGHTLY
Status: DISCONTINUED | OUTPATIENT
Start: 2025-02-15 | End: 2025-02-19

## 2025-02-15 RX ORDER — ONDANSETRON 2 MG/ML
4 INJECTION INTRAMUSCULAR; INTRAVENOUS EVERY 6 HOURS PRN
Status: DISCONTINUED | OUTPATIENT
Start: 2025-02-15 | End: 2025-02-19

## 2025-02-15 NOTE — H&P
[facility]  part of [facility]    History & Physical    Narciso Burnett Patient Status:  Emergency    1945 MRN [id number]   Location [facility] EMERGENCY DEPARTMENT Attending Dylan Ordaz MD   Hosp Day # 0 PCP NILO STEEN     Date:  2/15/2025  Date of Admission:  2/15/2025    History provided by:patient  Chief Complaint:     Chief Complaint   Patient presents with    Fall     Hip pain       HPI:   Narciso Burnett is a(n) 79 year old female. Chronic pain syndrome, TIA, in the past, cervical spinal stenosis status post cervical fusion, who came into the emergency room with complaining of pain in the right hip after she fell out of a rolling chair.  Patient denies any chest pain.  No shortness a breath Lozol no loss of consciousness.    Patient is not on any anticoagulants.    Patient is found to have a right hip fracture and being admitted to the hospital.    The patient denies any history of coronary artery disease.  Denies any history of congestive heart failure.        History     Past Medical History:    Anxiety state    Back problem    Cervical spinal stenosis    Chronic fatigue syndrome    Dysphagia    Esophageal reflux    TIA (transient ischemic attack)     - no residual problems    Vertigo    Visual impairment    glasses     Past Surgical History:   Procedure Laterality Date    Other      cervical fusion  with hardware      Family History   Problem Relation Age of Onset    Cancer Sister     Cancer Brother     Other (Other) Father         parkinsons     Social History:  Social History     Socioeconomic History    Marital status:    Tobacco Use    Smoking status: Former     Current packs/day: 0.00     Average packs/day: 0.5 packs/day for 10.0 years (5.0 ttl pk-yrs)     Types: Cigarettes     Start date: 2007     Quit date: 2017     Years since quittin.2    Smokeless tobacco: Never   Vaping Use    Vaping status: Never Used   Substance and  Sexual Activity    Alcohol use: Yes     Comment: socially    Drug use: No     Allergies/Medications:   Allergies: Allergies[1]  (Not in a hospital admission)      Review of Systems:   Pertinent items are noted in HPI.    Physical Exam:   Vital Signs:  Blood pressure 134/73, pulse 70, temperature 97.4 °F (36.3 °C), temperature source Temporal, resp. rate 18, SpO2 90%, not currently breastfeeding.     Patient lying comfortably in bed.  HEENT: No pallor no icterus  Neck no JVD no carotid bruit.  Heart S1 and S2 regular in rate and rhythm.  The lungs are clear to auscultation.  Abdomen is soft bowel sounds are present  Extremities no pedal edema.      Cervical Papanicolaou contraindicated    Results:     Lab Results   Component Value Date    WBC 11.0 02/15/2025    HGB 13.0 02/15/2025    HCT 39.4 02/15/2025    .0 02/15/2025    CREATSERUM 0.71 02/15/2025    BUN 10 02/15/2025     02/15/2025    K 3.7 02/15/2025     02/15/2025    CO2 26.0 02/15/2025     (H) 02/15/2025    CA 9.2 02/15/2025    ALB 4.1 02/15/2025    ALKPHO 87 02/15/2025    BILT 0.6 02/15/2025    TP 6.3 02/15/2025    AST 21 02/15/2025    ALT 11 02/15/2025    PTT 27.3 03/08/2018    INR 1.2 03/08/2018    TSH 0.66 03/09/2018    LIP 92 03/25/2020    DDIMER 0.76 01/19/2017    ESRML 11 12/26/2017    MG 2.0 01/19/2017    TROP 0.00 03/08/2018    B12 852 03/09/2018       XR HIP W OR WO PELVIS 2 OR 3 VIEWS, RIGHT (CPT=73502)    Result Date: 2/15/2025  CONCLUSION: Displaced/angulated right femoral neck fracture.    Dictated by (CST): Seven Neal MD on 2/15/2025 at 8:53 AM     Finalized by (CST): Seven Neal MD on 2/15/2025 at 8:54 AM               Assessment/Plan:     Closed displaced fracture of right femoral neck (HCC)  Orthopedic consulted patient will be going for surgery tomorrow.      Cervical spinal stenosis.    Will continue the patient on   Current pain regimen.      Will continue physical therapy and Occupational  Therapy.    Discussed with the nursing staff.  Discussed with the emergency room physician.      Active Orders   Transfer    Bed request Once     Frequency: Once     Number of Occurrences: 1 Occurrences   Consult    ED Consult to Orthopedic Surgery     Frequency: Once     Number of Occurrences: 1 Occurrences       Tanmay Min MD  2/15/2025          [1]   Allergies  Allergen Reactions    Aspirin ITCHING

## 2025-02-15 NOTE — ED PROVIDER NOTES
Patient Seen in: [facility] Emergency Department      History     Chief Complaint   Patient presents with    Fall     Hip pain     Stated Complaint: fall hip pain    Subjective:   HPI      79-year-old female presents with right hip pain.  Fell out of bed just prior to arrival.  Now states she cannot move her right leg due to pain.  Did not hit her head.    Objective:     No pertinent past medical history.            No pertinent past surgical history.              No pertinent social history.                Physical Exam     ED Triage Vitals [02/15/25 0752]   /72   Pulse 76   Resp 18   Temp 97.4 °F (36.3 °C)   Temp src Temporal   SpO2 97 %   O2 Device None (Room air)       Current Vitals:   Vital Signs  BP: 145/68  Pulse: 70  Resp: 18  Temp: 97.4 °F (36.3 °C)  Temp src: Temporal  MAP (mmHg): 89    Oxygen Therapy  SpO2: 90 %  O2 Device: None (Room air)        Physical Exam  Vital signs reviewed. Nursing note reviewed.  Constitutional: Well-developed. Well-nourished. In no acute distress  HENT: Mucous membranes moist.  No signs of trauma  EYES: No scleral icterus or conjunctival injection.  NECK: Full ROM. Supple.  No cervical spine tenderness  CARDIAC: Normal rate. Normal S1/ S2. 2+ distal pulses. No edema  PULM/CHEST: Clear to auscultation bilaterally. No wheezes.  No chest wall tenderness  ABD: Soft, non-tender, non-distended.   RECTAL: deferred  Extremities: Right hip shortened, internally rotated  NEURO: Awake, alert, moving toes on right foot, normal sensation right foot  SKIN: Warm and dry. No rash or lesions.  PSYCH: Normal judgment. Normal affect.        ED Course     Labs Reviewed   COMP METABOLIC PANEL (14) - Abnormal; Notable for the following components:       Result Value    Glucose 104 (*)     All other components within normal limits   CBC WITH DIFFERENTIAL WITH PLATELET   RAINBOW DRAW LAVENDER   RAINBOW DRAW LIGHT GREEN   RAINBOW DRAW BLUE   RAINBOW DRAW GOLD   MRSA/SA SCRN BY PCR:EMERG  ORTHO SURG ONLY                   MDM      Assessment:Patient is a 79 year old female presenting to the ED due to right hip pain.    Comorbidities/chronic illnesses impacting care: Chronic pain    History obtained from: Patient    External records and previous hospitalization records reviewed and documented below    Consideration of Social Determinants of Health and Impact on Medical Decision Making:  Housing/Transportation/Financial Strain/Access to healthcare/Food insecurity/family or Community support/Language and Literacy/Substance abuse/Mental health concerns/Disabilities     -Lives at home    Radiography/Imaging:  XR HIP W OR WO PELVIS 2 OR 3 VIEWS, RIGHT (CPT=73502)   Final Result   PROCEDURE: XR HIP W OR WO PELVIS 2 OR 3 VIEWS, RIGHT (CPT=73502)       COMPARISON: None.       INDICATIONS: Right hip pain due to fall x today.       TECHNIQUE: 3 views were obtained.         FINDINGS:            Bone mineralization is diminished.       There is a displaced and angulated fracture through the right femoral neck    with superior and lateral displacement of the distal fracture fragment and    internal rotation of the femoral head within the joint space.  There is no    dislocation.  There are no    other acute appearing fractures.       There are extensive atherosclerotic calcifications within the visualized    arteries.                       =====   CONCLUSION: Displaced/angulated right femoral neck fracture.               Dictated by (CST): Seven Neal MD on 2/15/2025 at 8:53 AM        Finalized by (CST): Seven Neal MD on 2/15/2025 at 8:54 AM                       ED course  Patient arrives here mildly hypertensive.  She appears uncomfortable.  Her leg is shortened, internally rotated.  No other signs of trauma to her head, spine, chest, abdomen.  Will check basic labs, x-ray right hip, likely needs admission.  No blood thinners.    Laboratory results above were independently viewed and interpreted as:  No leukocytosis, normal renal function  Radiology: ordered and independently interpreted as: Right femoral neck fracture    Will consult orthopedic surgery.  Is doing well after 2 doses of morphine.            Medications   sodium chloride 0.9% infusion 1,000 mL (has no administration in time range)   morphINE PF 4 MG/ML injection 4 mg (4 mg Intravenous Given 2/15/25 0815)   ondansetron (Zofran) 4 MG/2ML injection 4 mg (4 mg Intravenous Given 2/15/25 0815)   morphINE PF 4 MG/ML injection 4 mg (4 mg Intravenous Given 2/15/25 0849)             Admission disposition: 2/15/2025  9:05 AM           Medical Decision Making      Disposition and Plan     Clinical Impression:  1. Closed displaced fracture of right femoral neck (HCC)         Disposition:  Admit  2/15/2025  9:05 am    Follow-up:  No follow-up provider specified.  We recommend that you schedule follow up care with a primary care provider within the next three months to obtain basic health screening including reassessment of your blood pressure.      Medications Prescribed:  Current Discharge Medication List              Supplementary Documentation:         Hospital Problems       Present on Admission  Date Reviewed: 11/30/2020            ICD-10-CM Noted POA    * (Principal) Closed displaced fracture of right femoral neck (HCC) S72.001A 2/15/2025 Unknown

## 2025-02-15 NOTE — ED QUICK NOTES
To ED via [facility], pt fell out of rolling chair, fell onto right side. C/o right hip pain. No LOC, denies thinners.

## 2025-02-15 NOTE — ED QUICK NOTES
Orders for admission, patient is aware of plan and ready to go upstairs. Any questions, please call ED RN Madison at extension [phone].     Patient Covid vaccination status: Fully vaccinated     COVID Test Ordered in ED: None    COVID Suspicion at Admission: N/A    Running Infusions:      Mental Status/LOC at time of transport: x4    Other pertinent information:   CIWA score: N/A   NIH score:  N/A

## 2025-02-16 ENCOUNTER — APPOINTMENT (OUTPATIENT)
Dept: GENERAL RADIOLOGY | Facility: HOSPITAL | Age: 80
End: 2025-02-16
Attending: PHYSICIAN ASSISTANT
Payer: MEDICARE

## 2025-02-16 ENCOUNTER — APPOINTMENT (OUTPATIENT)
Dept: GENERAL RADIOLOGY | Facility: HOSPITAL | Age: 80
End: 2025-02-16
Attending: ORTHOPAEDIC SURGERY
Payer: MEDICARE

## 2025-02-16 ENCOUNTER — ANESTHESIA EVENT (OUTPATIENT)
Dept: SURGERY | Facility: HOSPITAL | Age: 80
End: 2025-02-16
Payer: MEDICARE

## 2025-02-16 ENCOUNTER — ANESTHESIA (OUTPATIENT)
Dept: SURGERY | Facility: HOSPITAL | Age: 80
End: 2025-02-16
Payer: MEDICARE

## 2025-02-16 PROCEDURE — 73501 X-RAY EXAM HIP UNI 1 VIEW: CPT | Performed by: PHYSICIAN ASSISTANT

## 2025-02-16 PROCEDURE — 88305 TISSUE EXAM BY PATHOLOGIST: CPT | Performed by: ORTHOPAEDIC SURGERY

## 2025-02-16 PROCEDURE — 0SRR0J9 REPLACEMENT OF RIGHT HIP JOINT, FEMORAL SURFACE WITH SYNTHETIC SUBSTITUTE, CEMENTED, OPEN APPROACH: ICD-10-PCS | Performed by: ORTHOPAEDIC SURGERY

## 2025-02-16 PROCEDURE — 88311 DECALCIFY TISSUE: CPT | Performed by: ORTHOPAEDIC SURGERY

## 2025-02-16 DEVICE — SELF CENTERING BI-POLAR HEAD 28MM ID 40MM OD
Type: IMPLANTABLE DEVICE | Status: FUNCTIONAL
Brand: SELF CENTERING

## 2025-02-16 DEVICE — M-SPEC METAL FEMORAL HEAD 12/14 TAPER DIAMETER 28MM +5: Type: IMPLANTABLE DEVICE | Site: HIP | Status: FUNCTIONAL

## 2025-02-16 DEVICE — IMPLANTABLE DEVICE
Type: IMPLANTABLE DEVICE | Status: FUNCTIONAL
Brand: REFOBACIN® BONE CEMENT R

## 2025-02-16 DEVICE — SUMMIT FEMORAL STEM 12/14 TAPER CEMENTED SIZE 3 STD 108MM
Type: IMPLANTABLE DEVICE | Status: FUNCTIONAL
Brand: SUMMIT

## 2025-02-16 DEVICE — CEMENTRALIZER STEM CENTRALIZER 8.5MM CEMENTED
Type: IMPLANTABLE DEVICE | Status: FUNCTIONAL
Brand: CEMENTRALIZER

## 2025-02-16 DEVICE — KIT BNE CEM PREP FEM QUIK-USE 10 PER CA: Type: IMPLANTABLE DEVICE | Site: HIP | Status: FUNCTIONAL

## 2025-02-16 RX ORDER — OXYCODONE HYDROCHLORIDE 5 MG/1
5 TABLET ORAL EVERY 4 HOURS PRN
Status: DISCONTINUED | OUTPATIENT
Start: 2025-02-16 | End: 2025-02-19

## 2025-02-16 RX ORDER — MORPHINE SULFATE 4 MG/ML
4 INJECTION, SOLUTION INTRAMUSCULAR; INTRAVENOUS EVERY 10 MIN PRN
Status: DISCONTINUED | OUTPATIENT
Start: 2025-02-16 | End: 2025-02-16 | Stop reason: HOSPADM

## 2025-02-16 RX ORDER — TRANEXAMIC ACID 10 MG/ML
1000 INJECTION, SOLUTION INTRAVENOUS ONCE
Status: COMPLETED | OUTPATIENT
Start: 2025-02-16 | End: 2025-02-16

## 2025-02-16 RX ORDER — VANCOMYCIN HYDROCHLORIDE 1 G/20ML
INJECTION, POWDER, LYOPHILIZED, FOR SOLUTION INTRAVENOUS AS NEEDED
Status: DISCONTINUED | OUTPATIENT
Start: 2025-02-16 | End: 2025-02-16 | Stop reason: HOSPADM

## 2025-02-16 RX ORDER — HYDROMORPHONE HYDROCHLORIDE 1 MG/ML
0.2 INJECTION, SOLUTION INTRAMUSCULAR; INTRAVENOUS; SUBCUTANEOUS EVERY 5 MIN PRN
Status: DISCONTINUED | OUTPATIENT
Start: 2025-02-16 | End: 2025-02-16 | Stop reason: HOSPADM

## 2025-02-16 RX ORDER — DOCUSATE SODIUM 100 MG/1
100 CAPSULE, LIQUID FILLED ORAL 2 TIMES DAILY
Status: DISCONTINUED | OUTPATIENT
Start: 2025-02-16 | End: 2025-02-19

## 2025-02-16 RX ORDER — MORPHINE SULFATE 10 MG/ML
6 INJECTION, SOLUTION INTRAMUSCULAR; INTRAVENOUS EVERY 10 MIN PRN
Status: DISCONTINUED | OUTPATIENT
Start: 2025-02-16 | End: 2025-02-16 | Stop reason: HOSPADM

## 2025-02-16 RX ORDER — MORPHINE SULFATE 4 MG/ML
2 INJECTION, SOLUTION INTRAMUSCULAR; INTRAVENOUS EVERY 10 MIN PRN
Status: DISCONTINUED | OUTPATIENT
Start: 2025-02-16 | End: 2025-02-16 | Stop reason: HOSPADM

## 2025-02-16 RX ORDER — POLYETHYLENE GLYCOL 3350 17 G/17G
17 POWDER, FOR SOLUTION ORAL DAILY PRN
Status: DISCONTINUED | OUTPATIENT
Start: 2025-02-16 | End: 2025-02-19

## 2025-02-16 RX ORDER — TRANEXAMIC ACID 10 MG/ML
INJECTION, SOLUTION INTRAVENOUS AS NEEDED
Status: DISCONTINUED | OUTPATIENT
Start: 2025-02-16 | End: 2025-02-16 | Stop reason: SURG

## 2025-02-16 RX ORDER — OXYCODONE HYDROCHLORIDE 5 MG/1
2.5 TABLET ORAL EVERY 4 HOURS PRN
Status: DISCONTINUED | OUTPATIENT
Start: 2025-02-16 | End: 2025-02-19

## 2025-02-16 RX ORDER — SODIUM PHOSPHATE, DIBASIC AND SODIUM PHOSPHATE, MONOBASIC 7; 19 G/230ML; G/230ML
1 ENEMA RECTAL ONCE AS NEEDED
Status: DISCONTINUED | OUTPATIENT
Start: 2025-02-16 | End: 2025-02-19

## 2025-02-16 RX ORDER — SODIUM CHLORIDE, SODIUM LACTATE, POTASSIUM CHLORIDE, CALCIUM CHLORIDE 600; 310; 30; 20 MG/100ML; MG/100ML; MG/100ML; MG/100ML
INJECTION, SOLUTION INTRAVENOUS CONTINUOUS
Status: DISCONTINUED | OUTPATIENT
Start: 2025-02-16 | End: 2025-02-16 | Stop reason: HOSPADM

## 2025-02-16 RX ORDER — HYDROMORPHONE HYDROCHLORIDE 1 MG/ML
0.4 INJECTION, SOLUTION INTRAMUSCULAR; INTRAVENOUS; SUBCUTANEOUS EVERY 5 MIN PRN
Status: DISCONTINUED | OUTPATIENT
Start: 2025-02-16 | End: 2025-02-16 | Stop reason: HOSPADM

## 2025-02-16 RX ORDER — DIPHENHYDRAMINE HCL 25 MG
25 CAPSULE ORAL EVERY 4 HOURS PRN
Status: DISCONTINUED | OUTPATIENT
Start: 2025-02-16 | End: 2025-02-19

## 2025-02-16 RX ORDER — DIPHENHYDRAMINE HYDROCHLORIDE 50 MG/ML
12.5 INJECTION INTRAMUSCULAR; INTRAVENOUS EVERY 4 HOURS PRN
Status: DISCONTINUED | OUTPATIENT
Start: 2025-02-16 | End: 2025-02-19

## 2025-02-16 RX ORDER — BISACODYL 10 MG
10 SUPPOSITORY, RECTAL RECTAL
Status: DISCONTINUED | OUTPATIENT
Start: 2025-02-16 | End: 2025-02-19

## 2025-02-16 RX ORDER — NALOXONE HYDROCHLORIDE 0.4 MG/ML
0.08 INJECTION, SOLUTION INTRAMUSCULAR; INTRAVENOUS; SUBCUTANEOUS AS NEEDED
Status: DISCONTINUED | OUTPATIENT
Start: 2025-02-16 | End: 2025-02-16 | Stop reason: HOSPADM

## 2025-02-16 RX ORDER — METOCLOPRAMIDE HYDROCHLORIDE 5 MG/ML
10 INJECTION INTRAMUSCULAR; INTRAVENOUS EVERY 8 HOURS PRN
Status: DISCONTINUED | OUTPATIENT
Start: 2025-02-16 | End: 2025-02-19

## 2025-02-16 RX ORDER — ROCURONIUM BROMIDE 10 MG/ML
INJECTION, SOLUTION INTRAVENOUS AS NEEDED
Status: DISCONTINUED | OUTPATIENT
Start: 2025-02-16 | End: 2025-02-16 | Stop reason: SURG

## 2025-02-16 RX ORDER — ONDANSETRON 2 MG/ML
4 INJECTION INTRAMUSCULAR; INTRAVENOUS EVERY 6 HOURS PRN
Status: DISCONTINUED | OUTPATIENT
Start: 2025-02-16 | End: 2025-02-19

## 2025-02-16 RX ORDER — PHENYLEPHRINE HCL 10 MG/ML
VIAL (ML) INJECTION AS NEEDED
Status: DISCONTINUED | OUTPATIENT
Start: 2025-02-16 | End: 2025-02-16 | Stop reason: SURG

## 2025-02-16 RX ORDER — HYDROMORPHONE HYDROCHLORIDE 1 MG/ML
0.6 INJECTION, SOLUTION INTRAMUSCULAR; INTRAVENOUS; SUBCUTANEOUS EVERY 5 MIN PRN
Status: DISCONTINUED | OUTPATIENT
Start: 2025-02-16 | End: 2025-02-16 | Stop reason: HOSPADM

## 2025-02-16 RX ORDER — ONDANSETRON 2 MG/ML
INJECTION INTRAMUSCULAR; INTRAVENOUS AS NEEDED
Status: DISCONTINUED | OUTPATIENT
Start: 2025-02-16 | End: 2025-02-16 | Stop reason: SURG

## 2025-02-16 RX ORDER — ACETAMINOPHEN 10 MG/ML
1000 INJECTION, SOLUTION INTRAVENOUS ONCE
Status: COMPLETED | OUTPATIENT
Start: 2025-02-16 | End: 2025-02-16

## 2025-02-16 RX ORDER — SENNOSIDES 8.6 MG
17.2 TABLET ORAL NIGHTLY
Status: DISCONTINUED | OUTPATIENT
Start: 2025-02-16 | End: 2025-02-19

## 2025-02-16 RX ORDER — DEXAMETHASONE SODIUM PHOSPHATE 4 MG/ML
VIAL (ML) INJECTION AS NEEDED
Status: DISCONTINUED | OUTPATIENT
Start: 2025-02-16 | End: 2025-02-16 | Stop reason: SURG

## 2025-02-16 RX ORDER — LIDOCAINE HYDROCHLORIDE 10 MG/ML
INJECTION, SOLUTION EPIDURAL; INFILTRATION; INTRACAUDAL; PERINEURAL AS NEEDED
Status: DISCONTINUED | OUTPATIENT
Start: 2025-02-16 | End: 2025-02-16 | Stop reason: SURG

## 2025-02-16 RX ADMIN — ONDANSETRON 4 MG: 2 INJECTION INTRAMUSCULAR; INTRAVENOUS at 11:54:00

## 2025-02-16 RX ADMIN — HYDROMORPHONE HYDROCHLORIDE 0.5 MG: 1 INJECTION, SOLUTION INTRAMUSCULAR; INTRAVENOUS; SUBCUTANEOUS at 13:40:00

## 2025-02-16 RX ADMIN — PHENYLEPHRINE HCL 50 MCG: 10 MG/ML VIAL (ML) INJECTION at 11:55:00

## 2025-02-16 RX ADMIN — PHENYLEPHRINE HCL 100 MCG: 10 MG/ML VIAL (ML) INJECTION at 12:03:00

## 2025-02-16 RX ADMIN — PHENYLEPHRINE HCL 100 MCG: 10 MG/ML VIAL (ML) INJECTION at 11:57:00

## 2025-02-16 RX ADMIN — LIDOCAINE HYDROCHLORIDE 50 MG: 10 INJECTION, SOLUTION EPIDURAL; INFILTRATION; INTRACAUDAL; PERINEURAL at 11:54:00

## 2025-02-16 RX ADMIN — PHENYLEPHRINE HCL 100 MCG: 10 MG/ML VIAL (ML) INJECTION at 12:06:00

## 2025-02-16 RX ADMIN — TRANEXAMIC ACID 1000 MG: 10 INJECTION, SOLUTION INTRAVENOUS at 12:25:00

## 2025-02-16 RX ADMIN — PHENYLEPHRINE HCL 100 MCG: 10 MG/ML VIAL (ML) INJECTION at 12:00:00

## 2025-02-16 RX ADMIN — PHENYLEPHRINE HCL 100 MCG: 10 MG/ML VIAL (ML) INJECTION at 12:08:00

## 2025-02-16 RX ADMIN — ROCURONIUM BROMIDE 20 MG: 10 INJECTION, SOLUTION INTRAVENOUS at 12:05:00

## 2025-02-16 RX ADMIN — DEXAMETHASONE SODIUM PHOSPHATE 8 MG: 4 MG/ML VIAL (ML) INJECTION at 11:54:00

## 2025-02-16 NOTE — ANESTHESIA POSTPROCEDURE EVALUATION
Patient: Narciso Burnett    Procedure Summary       Date: 02/16/25 Room / Location: [facility] MAIN OR  / [facility] MAIN OR    Anesthesia Start: 1147 Anesthesia Stop: 1353    Procedure: RIGHT HIP HEMIARTHROPLASTY (Right: Hip) Diagnosis:       Closed displaced fracture of right femoral neck (HCC)      (Closed displaced fracture of right femoral neck (HCC) [S72.001A])    Surgeons: Tyshawn Lazaro MD Anesthesiologist: Ale Goldstein MD    Anesthesia Type: general, spinal, MAC ASA Status: 3            Anesthesia Type: general, spinal, MAC    Vitals Value Taken Time   /75 02/16/25 1353   Temp 97.8 02/16/25 1353   Pulse 111 02/16/25 1353   Resp 16 02/16/25 1353   SpO2 92 % 02/16/25 1353   Vitals shown include unfiled device data.    [facility] AN Post Evaluation:   Patient Evaluated in PACU  Patient Participation: complete - patient participated  Level of Consciousness: awake and alert  Pain Score: 0  Pain Management: adequate  Airway Patency:patent  Yes    Nausea/Vomiting: none  Cardiovascular Status: acceptable  Respiratory Status: acceptable  Postoperative Hydration acceptable      Ale Goldstein MD  2/16/2025 1:53 PM

## 2025-02-16 NOTE — ANESTHESIA PROCEDURE NOTES
Airway  Date/Time: 2/16/2025 11:56 AM  Urgency: elective    Airway not difficult    General Information and Staff    Patient location during procedure: OR  Anesthesiologist: Ale Goldstein MD  Performed: anesthesiologist   Performed by: Ale Goldstein MD  Authorized by: Ale Goldstein MD      Indications and Patient Condition  Indications for airway management: anesthesia  Spontaneous Ventilation: absent  Sedation level: deep  Preoxygenated: yes  Patient position: sniffing  MILS not maintained throughout  Mask difficulty assessment: 0 - not attempted    Final Airway Details  Final airway type: endotracheal airway      Successful airway: ETT  Cuffed: yes   Successful intubation technique: direct laryngoscopy  Facilitating devices/methods: rapid sequence intubation  Endotracheal tube insertion site: oral  Blade: Sina  Blade size: #4  ETT size (mm): 7.0    Cormack-Lehane Classification: grade I - full view of glottis  Placement verified by: capnometry   Cuff volume (mL): 5  Measured from: lips  ETT to lips (cm): 22  Number of attempts at approach: 1  Number of other approaches attempted: 0

## 2025-02-16 NOTE — OPERATIVE REPORT
[facility]  Department of Orthopedic Surgery  Operative Report      Surgery Date: 2/15/2025 - 2025     Name: Narciso Burnett, : 1945, MRN: [id number]    Pre-operative Diagnosis: Femoral neck fracture, right    Post-operative Diagnosis: Femoral neck fracture, right    Procedure:  RIGHT HIP HEMIARTHROPLASTY: ,    Surgeon:  Tyshawn Lazaro MD       Anesthesia: General    Implants:    Implant Name Type Inv. Item Serial No.  Lot No. LRB No. Used Action   CENTRALIZER STEM DIA8.5MM DST FEM PAULINA MOLD - SNA  CENTRALIZER STEM DIA8.5MM DST FEM PAULINA MOLD NA [employer]  [id number] Right 1 Implanted   STEM FEM 108MM SZ 3 STD OFFSET  TAPR PAULINA - SNA  STEM FEM 108MM SZ 3 STD OFFSET / TAPR PAULINA NA [employer]  [id number] Right 1 Implanted   HEAD BPLR OD40MM ID28MM FEM HIP RUST POS ECC - SNA  HEAD BPLR OD40MM ID28MM FEM HIP RUST POS ECC NA [employer]  [id number] Right 1 Implanted   CEMENT BNE 40GM W/ GENT HI VISC RADPQ - SNA  CEMENT BNE 40GM W/ GENT HI VISC RADPQ NA [employer]  [id number] Right 1 Implanted   CEMENT BNE 40GM W/ GENT HI VISC RADPQ - SNA  CEMENT BNE 40GM W/ GENT HI VISC RADPQ NA [employer]  [id number] Right 1 Implanted   HEAD FEM 28MM +5 OFFSET  ARTC EZ TAPR - SNA  HEAD FEM 28MM +5 OFFSET  ARTC EZ TAPR NA [employer]  [id number] Right 1 Implanted       Complications: None    Condition: Stable    Weight Bearing Status:  Weight bearing as tolerated    Activity:  Activity as tolerated (Patient may move about with assist as indicated or with supervision.)    Indication for surgery: Patient fell and suffered the above injury.  Options were discussed and patient requested surgical correction.  Risks and benefits discussed including infection, neurovascular compromise, limited ambulation, dislocation, failure of hardware, fracture around the prostheses, stiffness and weakness, DVT, pulmonary embolus, MI, CVA, and death.  Patient  understands and wishes to proceed.  No guarantees were given.    Operative report: Patient brought to operative room in supine position after anesthesia induction, patient was placed in a well-padded lateral decubitus position.  The operative hip and leg were prepped and draped in usual manner.    An posterior hip incision was made.  The iliotibial band and fascia of the gluteus eb was divided in line and incision.  The piriformis was tagged and excised off the femur.  The T incision was made in the capsule and the capsule was opened.  The fracture site was identified.  The femoral neck cut was then made approximately 1 cm above the lesser trochanter in line with the prostheses.  The femoral head was removed and sized.  The trial prosthesis was placed in the acetabulum and the fit nicely.    A box chisel was used to get letter on the femur.  The femoral canal was identified and serially reamed to a nice fit.  The proximal femur was then serially rasped until a good fit.  The trial prosthesis of the left and placed in femoral neck and bipolar head was inserted.  The hip was reduced and went through a full range of motion of the stable.  Limb lengths were equal.  The trial prosthesis was removed and the femoral canal was thoroughly irrigated with Betadine solution and then dried.    The cement restrictor was inserted into the proximal femur.  The real femoral component was cemented in place using third-generation cement technique.  The wound was irrigated and cement hardened.  After the cement hardened, excess cement was removed.  The real femoral bipolar head was inserted.  The hip was reduced and went through a full range of motion and was stable.    40 mm bipolar head  Size 3 standard collar [place] cemented hip prostheses  +1.5, 28 mm ceramic femoral head     The wound was irrigated copious amount normal saline with Betadine using the jet lavage.  The capsulotomy closed over 1 g of vancomycin powder with #2  Ethibond.  The piriformis and capsule was then reattached to the proximal femur.  The fascia of the gluteus eb and iliotibial band closed with 0 PDS. Subcutaneous tissue closed with 2-0 Monocryl.The skin edges closed with 4-0 Stratafix.    The joint, deep fascia, and subcutaneous tissue then injected with pain cocktail.    Dressings were applied and patient is sent to recovery room in stable condition.    Estimated blood loss:  200 ml     Tyshawn Lazaro MD

## 2025-02-16 NOTE — CONSULTS
[facility]  part of [facility]    Report of Consultation    Narciso Burnett Patient Status:  Inpatient    1945 MRN [id number]   Location [facility] OPERATING ROOM Attending Tanmay Min MD   Hosp Day # 1 PCP NILO STEEN     Date of Admission:  2/15/2025  Date of Consult: 2025  Reason for Consultation:   Right hip fracture    History of Present Illness:   Patient is a 79 year old female who was admitted to the hospital for Closed displaced fracture of right femoral neck (HCC):  Patient is a independent community ambulator with no assistive devices while going to the bathroom in the middle the night.  She was unable to ambulate.  She is brought to emergency room yesterday.  X-rays show displaced right femoral neck fracture.  I was asked to see her for evaluation and treatment.  Patient denies any numbness or tingling or loss of consciousness.    Past Medical History  Past Medical History:    Anxiety state    Back problem    Cervical spinal stenosis    Chronic fatigue syndrome    Dysphagia    Esophageal reflux    TIA (transient ischemic attack)     - no residual problems    Vertigo    Visual impairment    glasses       Past Surgical History  Past Surgical History:   Procedure Laterality Date    Other      cervical fusion  with hardware        Family History  Family History   Problem Relation Age of Onset    Cancer Sister     Cancer Brother     Other (Other) Father         parkinsons       Social History  Social History     Socioeconomic History    Marital status:    Tobacco Use    Smoking status: Former     Current packs/day: 0.00     Average packs/day: 0.5 packs/day for 10.0 years (5.0 ttl pk-yrs)     Types: Cigarettes     Start date: 2007     Quit date: 2017     Years since quittin.2    Smokeless tobacco: Never   Vaping Use    Vaping status: Never Used   Substance and Sexual Activity    Alcohol use: Yes     Comment: socially     Drug use: No     Social Drivers of Health     Food Insecurity: No Food Insecurity (2/15/2025)    NCSS - Food Insecurity     Worried About Running Out of Food in the Last Year: No     Ran Out of Food in the Last Year: No   Transportation Needs: No Transportation Needs (2/15/2025)    NCSS - Transportation     Lack of Transportation: No   Housing Stability: Not At Risk (2/15/2025)    NCSS - Housing/Utilities     Has Housing: Yes     Worried About Losing Housing: No     Unable to Get Utilities: No        Current Medications:  Current Facility-Administered Medications   Medication Dose Route Frequency    ceFAZolin (Ancef) 2g in 10mL IV syringe premix  2 g Intravenous 30 Min Pre-Op    vancomycin (Vancocin) 750 mg in sodium chloride 0.9% 250 mL IVPB-ADDV  15 mg/kg Intravenous Once    [Transfer Hold] HYDROmorphone (Dilaudid) 1 MG/ML injection 0.2 mg  0.2 mg Intravenous Q2H PRN    Or    [Transfer Hold] HYDROmorphone (Dilaudid) 1 MG/ML injection 0.4 mg  0.4 mg Intravenous Q2H PRN    Or    [Transfer Hold] HYDROmorphone (Dilaudid) 1 MG/ML injection 0.8 mg  0.8 mg Intravenous Q2H PRN    [Transfer Hold] HYDROcodone-acetaminophen (Norco)  MG per tab 1 tablet  1 tablet Oral Q4H PRN    [Transfer Hold] LORazepam (Ativan) tab 0.5 mg  0.5 mg Oral Q6H PRN    [Transfer Hold] ondansetron (Zofran) 4 MG/2ML injection 4 mg  4 mg Intravenous Q6H PRN    [Transfer Hold] sennosides (Senokot) tab 8.6 mg  8.6 mg Oral Nightly    [Transfer Hold] pantoprazole (Protonix) DR tab 40 mg  40 mg Oral QAM AC    [Transfer Hold] meclizine (Antivert) tab 12.5 mg  12.5 mg Oral TID PRN    [Transfer Hold] amphetamine-dextroamphetamine (Adderall) tab 5 mg  5 mg Oral TID    sodium chloride 0.9% infusion   Intravenous Continuous    [Transfer Hold] acetaminophen (Tylenol) tab 650 mg  650 mg Oral Q6H PRN     Medications Prior to Admission   Medication Sig    HYDROcodone-acetaminophen  MG Oral Tab Take 1 tablet by mouth every 6 (six) hours as needed for  Pain.    HYDROcodone-acetaminophen  MG Oral Tab Take 1 tablet by mouth every 6 (six) hours as needed for Pain.    Dextroamphetamine Sulfate 5 MG Oral Tab Take 1 tablet (5 mg total) by mouth 3 (three) times daily.    Senna 8.6 MG Oral Tab Take 1 tablet (8.6 mg total) by mouth nightly.    Meclizine HCl 25 MG Oral Tab Take 1 tablet (25 mg total) by mouth 3 (three) times daily as needed.    diazepam 5 MG Oral Tab Take 1 tablet (5 mg total) by mouth every 6 (six) hours as needed for Anxiety.    cimetidine 400 MG Oral Tab Take 0.5 tablets (200 mg total) by mouth 2 (two) times daily.    methylPREDNISolone (MEDROL) 4 MG Oral Tablet Therapy Pack Dosepack: take as directed    ondansetron 4 MG Oral Tablet Dispersible Take 4 mg by mouth daily.    Cholecalciferol (VITAMIN D3) 1.25 MG (67443 UT) Oral Cap Take 1 tablet by mouth once a week.       Allergies  Allergies[1]    Review of Systems:   Notes that patient reports that she had osteopenia.    Physical Exam:   Blood pressure 157/73, pulse 84, temperature 98.4 °F (36.9 °C), temperature source Oral, resp. rate 19, weight 116 lb 9.6 oz (52.9 kg), SpO2 92%, not currently breastfeeding.      Ortho Exam  HEENT: Normal  Neck: Supple  Cor: Regular rate and rhythm  Lungs: No labored breathing  Extremity: No peripheral edema  Right leg shortened and externally rotated.  Normal motor and sensation to the foot.  Pedal pulses intact    Results:     Laboratory Data:  Lab Results   Component Value Date    WBC 11.0 02/15/2025    HGB 13.0 02/15/2025    HCT 39.4 02/15/2025    .0 02/15/2025    CREATSERUM 0.71 02/15/2025    BUN 10 02/15/2025     02/15/2025    K 3.7 02/15/2025     02/15/2025    CO2 26.0 02/15/2025     (H) 02/15/2025    CA 9.2 02/15/2025    ALB 4.1 02/15/2025    ALKPHO 87 02/15/2025    TP 6.3 02/15/2025    AST 21 02/15/2025    ALT 11 02/15/2025    PTT 27.3 03/08/2018    INR 1.2 03/08/2018    PTP 14.4 03/08/2018    TSH 0.66 03/09/2018    LIP 92  03/25/2020    DDIMER 0.76 01/19/2017    ESRML 11 12/26/2017    MG 2.0 01/19/2017    TROP 0.00 03/08/2018    B12 852 03/09/2018         Imaging:  XR HIP W OR WO PELVIS 2 OR 3 VIEWS, RIGHT (CPT=73502)    Result Date: 2/15/2025  CONCLUSION: Displaced/angulated right femoral neck fracture.    Dictated by (CST): Seven Neal MD on 2/15/2025 at 8:53 AM     Finalized by (CST): Seven Neal MD on 2/15/2025 at 8:54 AM             Impression:     Closed displaced fracture of right femoral neck (HCC)  This is an unstable fragility fracture and surgical correction is recommended.    Osteoporosis.      Recommendations:  Right hip hemiarthroplasty.  Risks and benefits discussed including infection, neurovascular compromise, failure of fixation, limb length discrepancy, limited ambulation, loosening of hardware, fracture around the prosthesis, DVT and pulmonary emboli.  Patient understands these risks and others that may occur and wishes to proceed.  No guarantees were given.    Note that patient will need bone health workup as an outpatient to evaluate and treat for osteoporosis.    Thank you for allowing me to participate in the care of your patient.    JEREMY ALEJANDRE MD, MD  2/16/2025         [1]   Allergies  Allergen Reactions    Aspirin ITCHING

## 2025-02-16 NOTE — PLAN OF CARE
VSS, afebrile. Admitted from ED with right femoral neck fracture after fall at home. Olivier inserted. Remains on bedrest. NPO at 0000 for possible surgery. IVF infusing. Dilaudid prn for pain management. Safety plan in place.     Problem: PAIN - ADULT  Goal: Verbalizes/displays adequate comfort level or patient's stated pain goal  Description: INTERVENTIONS:  - Encourage pt to monitor pain and request assistance  - Assess pain using appropriate pain scale  - Administer analgesics based on type and severity of pain and evaluate response  - Implement non-pharmacological measures as appropriate and evaluate response  - Consider cultural and social influences on pain and pain management  - Manage/alleviate anxiety  - Utilize distraction and/or relaxation techniques  - Monitor for opioid side effects  - Notify MD/LIP if interventions unsuccessful or patient reports new pain  - Anticipate increased pain with activity and pre-medicate as appropriate  Outcome: Progressing     Problem: SAFETY ADULT - FALL  Goal: Free from fall injury  Description: INTERVENTIONS:  - Assess pt frequently for physical needs  - Identify cognitive and physical deficits and behaviors that affect risk of falls.  - [place] fall precautions as indicated by assessment.  - Educate pt/family on patient safety including physical limitations  - Instruct pt to call for assistance with activity based on assessment  - Modify environment to reduce risk of injury  - Provide assistive devices as appropriate  - Consider OT/PT consult to assist with strengthening/mobility  - Encourage toileting schedule  Outcome: Progressing     Problem: DISCHARGE PLANNING  Goal: Discharge to home or other facility with appropriate resources  Description: INTERVENTIONS:  - Identify barriers to discharge w/pt and caregiver  - Include patient/family/discharge partner in discharge planning  - Arrange for needed discharge resources and transportation as appropriate  - Identify  discharge learning needs (meds, wound care, etc)  - Arrange for interpreters to assist at discharge as needed  - Consider post-discharge preferences of patient/family/discharge partner  - Complete POLST form as appropriate  - Assess patient's ability to be responsible for managing their own health  - Refer to Case Management Department for coordinating discharge planning if the patient needs post-hospital services based on physician/LIP order or complex needs related to functional status, cognitive ability or social support system  Outcome: Progressing     Problem: MUSCULOSKELETAL - ADULT  Goal: Return mobility to safest level of function  Description: INTERVENTIONS:  - Assess patient stability and activity tolerance for standing, transferring and ambulating w/ or w/o assistive devices  - Assist with transfers and ambulation using safe patient handling equipment as needed  - Ensure adequate protection for wounds/incisions during mobilization  - Obtain PT/OT consults as needed  - Advance activity as appropriate  - Communicate ordered activity level and limitations with patient/family  Outcome: Progressing

## 2025-02-16 NOTE — PLAN OF CARE
Patient has been resting in bed w/ call light within reach. Is a&ox2-3. Per  Allen pt can be confused at times. Is on general diet, no n/v reported. Has nazario in place. Is on eliquis and has teds and scds for dvt prophylaxis. Has abduction wedge in place, dressing to right hip clean and intact. DC plan pending, per  he feels she should go to rehab due to him being worried of not being able to care for her at home.     Problem: Patient Centered Care  Goal: Patient preferences are identified and integrated in the patient's plan of care  Description: Interventions:  - What would you like us to know as we care for you? I live w/ my    - Provide timely, complete, and accurate information to patient/family  - Incorporate patient and family knowledge, values, beliefs, and cultural backgrounds into the planning and delivery of care  - Encourage patient/family to participate in care and decision-making at the level they choose  - Honor patient and family perspectives and choices  Outcome: Progressing     Problem: Patient/Family Goals  Goal: Patient/Family Long Term Goal  Description: Patient's Long Term Goal: To keep pain level <5    Interventions:  - Pain medications  - pt/ot  - rehab  - See additional Care Plan goals for specific interventions  Outcome: Progressing  Goal: Patient/Family Short Term Goal  Description: Patient's Short Term Goal: to go home    Interventions:   - follow plan of care  - See additional Care Plan goals for specific interventions  Outcome: Progressing     Problem: PAIN - ADULT  Goal: Verbalizes/displays adequate comfort level or patient's stated pain goal  Description: INTERVENTIONS:  - Encourage pt to monitor pain and request assistance  - Assess pain using appropriate pain scale  - Administer analgesics based on type and severity of pain and evaluate response  - Implement non-pharmacological measures as appropriate and evaluate response  - Consider cultural and social  influences on pain and pain management  - Manage/alleviate anxiety  - Utilize distraction and/or relaxation techniques  - Monitor for opioid side effects  - Notify MD/LIP if interventions unsuccessful or patient reports new pain  - Anticipate increased pain with activity and pre-medicate as appropriate  Outcome: Progressing     Problem: SAFETY ADULT - FALL  Goal: Free from fall injury  Description: INTERVENTIONS:  - Assess pt frequently for physical needs  - Identify cognitive and physical deficits and behaviors that affect risk of falls.  - [place] fall precautions as indicated by assessment.  - Educate pt/family on patient safety including physical limitations  - Instruct pt to call for assistance with activity based on assessment  - Modify environment to reduce risk of injury  - Provide assistive devices as appropriate  - Consider OT/PT consult to assist with strengthening/mobility  - Encourage toileting schedule  Outcome: Progressing     Problem: DISCHARGE PLANNING  Goal: Discharge to home or other facility with appropriate resources  Description: INTERVENTIONS:  - Identify barriers to discharge w/pt and caregiver  - Include patient/family/discharge partner in discharge planning  - Arrange for needed discharge resources and transportation as appropriate  - Identify discharge learning needs (meds, wound care, etc)  - Arrange for interpreters to assist at discharge as needed  - Consider post-discharge preferences of patient/family/discharge partner  - Complete POLST form as appropriate  - Assess patient's ability to be responsible for managing their own health  - Refer to Case Management Department for coordinating discharge planning if the patient needs post-hospital services based on physician/LIP order or complex needs related to functional status, cognitive ability or social support system  Outcome: Progressing     Problem: MUSCULOSKELETAL - ADULT  Goal: Return mobility to safest level of  function  Description: INTERVENTIONS:  - Assess patient stability and activity tolerance for standing, transferring and ambulating w/ or w/o assistive devices  - Assist with transfers and ambulation using safe patient handling equipment as needed  - Ensure adequate protection for wounds/incisions during mobilization  - Obtain PT/OT consults as needed  - Advance activity as appropriate  - Communicate ordered activity level and limitations with patient/family  Outcome: Progressing     Problem: Impaired Functional Mobility  Goal: Achieve highest/safest level of mobility/gait  Description: Interventions:  - Assess patient's functional ability and stability  - Promote increasing activity/tolerance for mobility and gait  - Educate and engage patient/family in tolerated activity level and precautions  - Recommend use of total lift for transfers  Outcome: Progressing

## 2025-02-16 NOTE — PLAN OF CARE
Problem: Patient Centered Care  Goal: Patient preferences are identified and integrated in the patient's plan of care  Description: Interventions:  - What would you like us to know as we care for you?   - Provide timely, complete, and accurate information to patient/family  - Incorporate patient and family knowledge, values, beliefs, and cultural backgrounds into the planning and delivery of care  - Encourage patient/family to participate in care and decision-making at the level they choose  - Honor patient and family perspectives and choices  Outcome: Progressing     Problem: Patient/Family Goals  Goal: Patient/Family Long Term Goal  Description: Patient's Long Term Goal:     Interventions:  -   - See additional Care Plan goals for specific interventions  Outcome: Progressing  Goal: Patient/Family Short Term Goal  Description: Patient's Short Term Goal:     Interventions:   -   - See additional Care Plan goals for specific interventions  Outcome: Progressing     Problem: PAIN - ADULT  Goal: Verbalizes/displays adequate comfort level or patient's stated pain goal  Description: INTERVENTIONS:  - Encourage pt to monitor pain and request assistance  - Assess pain using appropriate pain scale  - Administer analgesics based on type and severity of pain and evaluate response  - Implement non-pharmacological measures as appropriate and evaluate response  - Consider cultural and social influences on pain and pain management  - Manage/alleviate anxiety  - Utilize distraction and/or relaxation techniques  - Monitor for opioid side effects  - Notify MD/LIP if interventions unsuccessful or patient reports new pain  - Anticipate increased pain with activity and pre-medicate as appropriate  Outcome: Progressing     Problem: SAFETY ADULT - FALL  Goal: Free from fall injury  Description: INTERVENTIONS:  - Assess pt frequently for physical needs  - Identify cognitive and physical deficits and behaviors that affect risk of  falls.  - [place] fall precautions as indicated by assessment.  - Educate pt/family on patient safety including physical limitations  - Instruct pt to call for assistance with activity based on assessment  - Modify environment to reduce risk of injury  - Provide assistive devices as appropriate  - Consider OT/PT consult to assist with strengthening/mobility  - Encourage toileting schedule  Outcome: Progressing     Problem: DISCHARGE PLANNING  Goal: Discharge to home or other facility with appropriate resources  Description: INTERVENTIONS:  - Identify barriers to discharge w/pt and caregiver  - Include patient/family/discharge partner in discharge planning  - Arrange for needed discharge resources and transportation as appropriate  - Identify discharge learning needs (meds, wound care, etc)  - Arrange for interpreters to assist at discharge as needed  - Consider post-discharge preferences of patient/family/discharge partner  - Complete POLST form as appropriate  - Assess patient's ability to be responsible for managing their own health  - Refer to Case Management Department for coordinating discharge planning if the patient needs post-hospital services based on physician/LIP order or complex needs related to functional status, cognitive ability or social support system  Outcome: Progressing     Problem: MUSCULOSKELETAL - ADULT  Goal: Return mobility to safest level of function  Description: INTERVENTIONS:  - Assess patient stability and activity tolerance for standing, transferring and ambulating w/ or w/o assistive devices  - Assist with transfers and ambulation using safe patient handling equipment as needed  - Ensure adequate protection for wounds/incisions during mobilization  - Obtain PT/OT consults as needed  - Advance activity as appropriate  - Communicate ordered activity level and limitations with patient/family  Outcome: Progressing     Problem: Impaired Functional Mobility  Goal: Achieve highest/safest  level of mobility/gait  Description: Interventions:  - Assess patient's functional ability and stability  - Promote increasing activity/tolerance for mobility and gait  - Educate and engage patient/family in tolerated activity level and precautions  Outcome: Progressing   A&Ox3. Forgetful at times. Patient had a temp. Of 100.2, Tylenol given. Olivier I'm place. PRN Dilaudid given for pain. NPO. Possible surgery today. Patient aware with plan of care.

## 2025-02-16 NOTE — PROGRESS NOTES
[facility]  part of [facility]    Progress Note    Narciso Burnett Patient Status:  Inpatient    1945 MRN [id number]   Location [facility] PRE OP RECOVERY Attending Tanmay Min MD   Hosp Day # 1 PCP NILO STEEN       SUBJECTIVE:  Patient seen in the recovery room.  Doing better.  Complains of pain.      OBJECTIVE:  Vital signs in last 24 hours:  /66 (BP Location: Right arm)   Pulse 99   Temp 99.2 °F (37.3 °C) (Oral)   Resp 18   Wt 116 lb 9.6 oz (52.9 kg)   SpO2 94%   BMI 20.01 kg/m²     Intake/Output:    Intake/Output Summary (Last 24 hours) at 2025 1615  Last data filed at 2025 1432  Gross per 24 hour   Intake 1135 ml   Output 1375 ml   Net -240 ml       Wt Readings from Last 3 Encounters:   02/15/25 116 lb 9.6 oz (52.9 kg)   22 104 lb (47.2 kg)   20 106 lb (48.1 kg)       Exam  Gen: No acute distress, alert  HEENT: No pallor no icterus  Pulm: Lungs clear, normal respiratory effort  CV: Heart with regular rate and rhythm, no peripheral edema  Abd: Abdomen soft, nontender, nondistended, no organomegaly, bowel sounds present  MSK: Full range of motion in extremities, no clubbing, no cyanosis  Skin: no rashes or lesions  CNS: Alert    Data Review:     Labs:        LABS  Recent Labs   Lab 02/15/25  0757   RBC 4.08   HGB 13.0   HCT 39.4   MCV 96.6   MCH 31.9   MCHC 33.0   RDW 12.0   NEPRELIM 7.45   WBC 11.0   .0   AST 21   ALT 11   *       Imaging:      Meds:   Current Facility-Administered Medications   Medication Dose Route Frequency    sodium chloride 0.9 % IV bolus 500 mL  500 mL Intravenous Once PRN    sennosides (Senokot) tab 17.2 mg  17.2 mg Oral Nightly    docusate sodium (Colace) cap 100 mg  100 mg Oral BID    polyethylene glycol (PEG 3350) (Miralax) 17 g oral packet 17 g  17 g Oral Daily PRN    magnesium hydroxide (Milk of Magnesia) 400 MG/5ML oral suspension 30 mL  30 mL Oral Daily PRN    bisacodyl  (Dulcolax) 10 MG rectal suppository 10 mg  10 mg Rectal Daily PRN    fleet enema (Fleet) rectal enema 133 mL  1 enema Rectal Once PRN    ondansetron (Zofran) 4 MG/2ML injection 4 mg  4 mg Intravenous Q6H PRN    metoclopramide (Reglan) 5 mg/mL injection 10 mg  10 mg Intravenous Q8H PRN    diphenhydrAMINE (Benadryl) cap/tab 25 mg  25 mg Oral Q4H PRN    Or    diphenhydrAMINE (Benadryl) 50 mg/mL  injection 12.5 mg  12.5 mg Intravenous Q4H PRN    [START ON 2/17/2025] apixaban (Eliquis) tab 2.5 mg  2.5 mg Oral BID@0600,1800    oxyCODONE immediate release tab 2.5 mg  2.5 mg Oral Q4H PRN    Or    oxyCODONE immediate release tab 5 mg  5 mg Oral Q4H PRN    ceFAZolin (Ancef) 2g in 10mL IV syringe premix  2 g Intravenous Q8H    HYDROmorphone (Dilaudid) 1 MG/ML injection 0.2 mg  0.2 mg Intravenous Q2H PRN    Or    HYDROmorphone (Dilaudid) 1 MG/ML injection 0.4 mg  0.4 mg Intravenous Q2H PRN    Or    HYDROmorphone (Dilaudid) 1 MG/ML injection 0.8 mg  0.8 mg Intravenous Q2H PRN    LORazepam (Ativan) tab 0.5 mg  0.5 mg Oral Q6H PRN    ondansetron (Zofran) 4 MG/2ML injection 4 mg  4 mg Intravenous Q6H PRN    sennosides (Senokot) tab 8.6 mg  8.6 mg Oral Nightly    pantoprazole (Protonix) DR tab 40 mg  40 mg Oral QAM AC    meclizine (Antivert) tab 12.5 mg  12.5 mg Oral TID PRN    amphetamine-dextroamphetamine (Adderall) tab 5 mg  5 mg Oral TID    sodium chloride 0.9% infusion   Intravenous Continuous    acetaminophen (Tylenol) tab 650 mg  650 mg Oral Q6H PRN       Assessment  Patient Active Problem List   Diagnosis    Acute bronchitis with COPD (HCC)    Acute respiratory failure with hypoxia (HCC)    Hypoxemia    Weakness generalized    Dysphagia, unspecified type    Weight loss    Hyponatremia    TIA involving carotid artery    Attention deficit disorder (ADD) without hyperactivity    Moderate protein-calorie malnutrition (HCC)    Closed displaced fracture of right femoral neck (HCC)     Closed displaced fracture of right femoral  neck (HCC)  Orthopedic consulted patient will be going for surgery tomorrow.        Cervical spinal stenosis.     Will continue the patient on   Current pain regimen.        Will continue physical therapy and Occupational Therapy.     Discussed with the nursing staff.  Discussed with the emergency room physician.             Active Orders   Transfer     Bed request Once       Frequency: Once       Number of Occurrences: 1 Occurrences   Consult     ED Consult to Orthopedic Surgery       Frequency: Once             Plan:     Continue current treatment.  Discussed with the nursing staff.       Active Orders   Nourishments    Room Service Eligibility Until Discontinued     Frequency: Until Discontinued     Number of Occurrences: Until Specified    Room Service Notify RN Until Discontinued     Frequency: Until Discontinued     Number of Occurrences: Until Specified   OT    OT eval and treat     Frequency: Once     Number of Occurrences: 1 Occurrences   PT    PT eval and treat     Frequency: Once     Number of Occurrences: 1 Occurrences     Order Comments: Starting today if to floor by 4pm     Respiratory Care    Incentive spriometry: RT to teach pt Once     Frequency: Once     Number of Occurrences: 1 Occurrences   Precaution    Hip precautions Continuous     Frequency: Continuous     Number of Occurrences: Until Specified   Pathology and Cytology    Specimen to Pathology Tissue     Frequency: Release Upon Ordering     Number of Occurrences: 1 Occurrences   Diet    Regular/General diet Is Patient on Accuchecks? No     Frequency: Effective Now     Number of Occurrences: Until Specified   Nursing    Change dressing to affected leg POD#3 or prior to Discharge     Frequency: Q Shift     Number of Occurrences: Until Specified    Early ambulation if no contraindications     Frequency: Continuous     Number of Occurrences: Until Specified    If patient on anticoagulants notify physician of:     Frequency: Until Discontinued      Number of Occurrences: Until Specified    Incentive spirometry     Frequency: 10X/PER HR AWAKE     Number of Occurrences: Until Specified    Initiate electrolyte protocol     Frequency: Until Discontinued     Number of Occurrences: Until Specified    Initiate Oxygen Protocol     Frequency: Until Discontinued     Number of Occurrences: Until Specified    Insert nazario catheter     Frequency: Once     Number of Occurrences: 1 Occurrences    Intake and Output     Frequency: Q Shift     Number of Occurrences: Until Specified    Intermittent Straight Cath Protocol for Acute Urinary Retention     Frequency: Until Discontinued     Number of Occurrences: Until Specified    Neurovascular checks     Frequency: Q4H     Number of Occurrences: Until Specified    Notify physician     Frequency: PRN     Number of Occurrences: Until Specified    Nurse Driven Indwelling Urinary Catheter Removal Protocol     Frequency: Until Discontinued     Number of Occurrences: Until Specified    Nurse Driven Indwelling Urinary Catheter Removal Protocol     Frequency: Until Discontinued     Number of Occurrences: Until Specified    Place sequential compression device     Frequency: Continuous     Number of Occurrences: Until Specified    Place sequential compression device     Frequency: Continuous     Number of Occurrences: Until Specified     Order Comments: Except while ambulating.      Place DAWOOD hose Knee high     Frequency: Continuous     Number of Occurrences: Until Specified    Pulse oximetry     Frequency: Spot Check     Number of Occurrences: Until Specified     Order Comments: With vital signs      Saline lock PIV when tolerating PO intake     Frequency: Once     Number of Occurrences: 1 Occurrences    Up to chair Once     Frequency: Once     Number of Occurrences: 1 Occurrences     Order Comments: Tonight, POD#0      Up with assistance PRN     Frequency: PRN     Number of Occurrences: Until Specified    Vital signs     Frequency: Per  Unit Routine     Number of Occurrences: 2 Hours     Order Comments: ED holding order only  Cancel if other admission orders exist!        Vital signs     Frequency: Per Unit Routine     Number of Occurrences: Until Specified     Order Comments: Every 4 hours for 24 hours, then unit routine      Weight bearing as tolerated     Frequency: Until Discontinued     Number of Occurrences: Until Specified    Weight bearing as tolerated     Frequency: Until Discontinued     Number of Occurrences: Until Specified   Medications    acetaminophen (Tylenol) tab 650 mg     Frequency: Q6H PRN     Dose: 650 mg     Route: Oral    amphetamine-dextroamphetamine (Adderall) tab 5 mg     Frequency: TID     Dose: 5 mg     Route: Oral    apixaban (Eliquis) tab 2.5 mg     Frequency: BID@0600,1800     Dose: 2.5 mg     Route: Oral    bisacodyl (Dulcolax) 10 MG rectal suppository 10 mg     Frequency: Daily PRN     Dose: 10 mg     Route: Rectal    ceFAZolin (Ancef) 2g in 10mL IV syringe premix     Frequency: Q8H     Dose: 2 g     Route: Intravenous    diphenhydrAMINE (Benadryl) 50 mg/mL  injection 12.5 mg     Linked Order: Or     Frequency: Q4H PRN     Dose: 12.5 mg     Route: Intravenous    diphenhydrAMINE (Benadryl) cap/tab 25 mg     Linked Order: Or     Frequency: Q4H PRN     Dose: 25 mg     Route: Oral    docusate sodium (Colace) cap 100 mg     Frequency: BID     Dose: 100 mg     Route: Oral    fleet enema (Fleet) rectal enema 133 mL     Frequency: Once PRN     Dose: 1 enema     Route: Rectal    HYDROmorphone (Dilaudid) 1 MG/ML injection 0.2 mg     Linked Order: Or     Frequency: Q2H PRN     Dose: 0.2 mg     Route: Intravenous    HYDROmorphone (Dilaudid) 1 MG/ML injection 0.4 mg     Linked Order: Or     Frequency: Q2H PRN     Dose: 0.4 mg     Route: Intravenous    HYDROmorphone (Dilaudid) 1 MG/ML injection 0.8 mg     Linked Order: Or     Frequency: Q2H PRN     Dose: 0.8 mg     Route: Intravenous    LORazepam (Ativan) tab 0.5 mg      Frequency: Q6H PRN     Dose: 0.5 mg     Route: Oral    magnesium hydroxide (Milk of Magnesia) 400 MG/5ML oral suspension 30 mL     Frequency: Daily PRN     Dose: 30 mL     Route: Oral    meclizine (Antivert) tab 12.5 mg     Frequency: TID PRN     Dose: 12.5 mg     Route: Oral    metoclopramide (Reglan) 5 mg/mL injection 10 mg     Frequency: Q8H PRN     Dose: 10 mg     Route: Intravenous    ondansetron (Zofran) 4 MG/2ML injection 4 mg     Frequency: Q6H PRN     Dose: 4 mg     Route: Intravenous    ondansetron (Zofran) 4 MG/2ML injection 4 mg     Frequency: Q6H PRN     Dose: 4 mg     Route: Intravenous    oxyCODONE immediate release tab 2.5 mg     Linked Order: Or     Frequency: Q4H PRN     Dose: 2.5 mg     Route: Oral    oxyCODONE immediate release tab 5 mg     Linked Order: Or     Frequency: Q4H PRN     Dose: 5 mg     Route: Oral    pantoprazole (Protonix) DR tab 40 mg     Frequency: QAM AC     Dose: 40 mg     Route: Oral    polyethylene glycol (PEG 3350) (Miralax) 17 g oral packet 17 g     Frequency: Daily PRN     Dose: 17 g     Route: Oral    sennosides (Senokot) tab 17.2 mg     Frequency: Nightly     Dose: 17.2 mg     Route: Oral    sennosides (Senokot) tab 8.6 mg     Frequency: Nightly     Dose: 8.6 mg     Route: Oral    sodium chloride 0.9 % IV bolus 500 mL     Frequency: Once PRN     Dose: 500 mL     Route: Intravenous    sodium chloride 0.9% infusion     Frequency: Continuous     Route: Intravenous     Tanmay Min MD  2/16/2025

## 2025-02-16 NOTE — ANESTHESIA PREPROCEDURE EVALUATION
Anesthesia PreOp Note    HPI:     Narciso Burnett is a 79 year old female who presents for preoperative consultation requested by: Tyshawn Lazaro MD    Date of Surgery: 2/16/2025    Procedure(s):  RIGHT HIP HEMIARTHROPLASTY  Indication: Closed displaced fracture of right femoral neck (HCC) [S72.001A]    Relevant Problems   No relevant active problems       NPO:  Last Liquid Consumption Date: 02/15/25  Last Liquid Consumption Time: 2350  Last Solid Consumption Date: 02/15/25  Last Solid Consumption Time: 1100  Last Liquid Consumption Date: 02/15/25          History Review:  Patient Active Problem List    Diagnosis Date Noted    Closed displaced fracture of right femoral neck (HCC) 02/15/2025    Hyponatremia 03/08/2018    TIA involving carotid artery 03/08/2018    Attention deficit disorder (ADD) without hyperactivity 03/08/2018    Moderate protein-calorie malnutrition (HCC) 03/08/2018    Weight loss 12/26/2017    Weakness generalized 12/25/2017    Dysphagia, unspecified type 12/25/2017    Hypoxemia 01/20/2017    Acute bronchitis with COPD (HCC) 01/19/2017    Acute respiratory failure with hypoxia (HCC) 01/19/2017       Past Medical History:    Anxiety state    Back problem    Cervical spinal stenosis    Chronic fatigue syndrome    Dysphagia    Esophageal reflux    TIA (transient ischemic attack)    2017 - no residual problems    Vertigo    Visual impairment    glasses       Past Surgical History:   Procedure Laterality Date    Other      cervical fusion 4/17 with hardware        Prescriptions Prior to Admission[1]  Current Medications and Prescriptions Ordered in Epic[2]    Allergies[3]    Family History   Problem Relation Age of Onset    Cancer Sister     Cancer Brother     Other (Other) Father         parkinsons     Social History     Socioeconomic History    Marital status:    Tobacco Use    Smoking status: Former     Current packs/day: 0.00     Average packs/day: 0.5 packs/day for 10.0 years (5.0 ttl pk-yrs)      Types: Cigarettes     Start date: 2007     Quit date: 2017     Years since quittin.2    Smokeless tobacco: Never   Vaping Use    Vaping status: Never Used   Substance and Sexual Activity    Alcohol use: Yes     Comment: socially    Drug use: No       Available pre-op labs reviewed.  Lab Results   Component Value Date    WBC 11.0 02/15/2025    RBC 4.08 02/15/2025    HGB 13.0 02/15/2025    HCT 39.4 02/15/2025    MCV 96.6 02/15/2025    MCH 31.9 02/15/2025    MCHC 33.0 02/15/2025    RDW 12.0 02/15/2025    .0 02/15/2025     Lab Results   Component Value Date     02/15/2025    K 3.7 02/15/2025     02/15/2025    CO2 26.0 02/15/2025    BUN 10 02/15/2025    CREATSERUM 0.71 02/15/2025     (H) 02/15/2025    CA 9.2 02/15/2025          Vital Signs:  Body mass index is 20.01 kg/m².   weight is 52.9 kg (116 lb 9.6 oz). Her oral temperature is 98.4 °F (36.9 °C). Her blood pressure is 157/73 and her pulse is 84. Her respiration is 19 and oxygen saturation is 92%.   Vitals:    02/15/25 1229 02/15/25 1823 02/15/25 2114 25 0305   BP: 154/69  148/79 157/73   Pulse: 80  93 84   Resp: 19  18 19   Temp: 98 °F (36.7 °C)  100.2 °F (37.9 °C) 98.4 °F (36.9 °C)   TempSrc: Oral  Oral Oral   SpO2: 93%  92% 92%   Weight:  52.9 kg (116 lb 9.6 oz)          Anesthesia Evaluation     Patient summary reviewed and Nursing notes reviewed    Airway   Mallampati: II  TM distance: >3 FB  Neck ROM: full  Dental      Pulmonary - normal exam   (+) COPD  Cardiovascular - negative ROS and normal exam  Exercise tolerance: good    Neuro/Psych    (+)  TIA, anxiety/panic attacks,        Comments: C-spine fusion    GI/Hepatic/Renal    (+) GERD    Endo/Other - negative ROS   Abdominal  - normal exam                 Anesthesia Plan:   ASA:  3  Plan:   General, spinal and MAC  Airway:  ETT  Post-op Pain Management: IV analgesics  Informed Consent Plan and Risks Discussed With:  Patient  Use of Blood Products Discussed  With:  Patient  Blood Product Use Consented        I have informed Narciso Burnett and/or legal guardian or family member of the nature of the anesthetic plan, benefits, risks including possible dental damage if relevant, major complications, and any alternative forms of anesthetic management.   All of the patient's questions were answered to the best of my ability. The patient desires the anesthetic management as planned.  Ale Goldstein MD  2/16/2025 8:22 AM  Present on Admission:  **None**           [1]   Medications Prior to Admission   Medication Sig Dispense Refill Last Dose/Taking    HYDROcodone-acetaminophen  MG Oral Tab Take 1 tablet by mouth every 6 (six) hours as needed for Pain. 25 tablet 0 2/14/2025    HYDROcodone-acetaminophen  MG Oral Tab Take 1 tablet by mouth every 6 (six) hours as needed for Pain.   2/14/2025    Dextroamphetamine Sulfate 5 MG Oral Tab Take 1 tablet (5 mg total) by mouth 3 (three) times daily.  0 2/14/2025    Senna 8.6 MG Oral Tab Take 1 tablet (8.6 mg total) by mouth nightly.   2/14/2025    Meclizine HCl 25 MG Oral Tab Take 1 tablet (25 mg total) by mouth 3 (three) times daily as needed.   2/14/2025    diazepam 5 MG Oral Tab Take 1 tablet (5 mg total) by mouth every 6 (six) hours as needed for Anxiety.   2/14/2025    cimetidine 400 MG Oral Tab Take 0.5 tablets (200 mg total) by mouth 2 (two) times daily.   2/14/2025    methylPREDNISolone (MEDROL) 4 MG Oral Tablet Therapy Pack Dosepack: take as directed 1 each 0     ondansetron 4 MG Oral Tablet Dispersible Take 4 mg by mouth daily.       Cholecalciferol (VITAMIN D3) 1.25 MG (34115 UT) Oral Cap Take 1 tablet by mouth once a week.      [2]   Current Facility-Administered Medications Ordered in Epic   Medication Dose Route Frequency Provider Last Rate Last Admin    HYDROmorphone (Dilaudid) 1 MG/ML injection 0.2 mg  0.2 mg Intravenous Q2H PRN Tanmay Min MD        Or    HYDROmorphone (Dilaudid) 1 MG/ML injection 0.4 mg  0.4  mg Intravenous Q2H PRN Tanmay Min MD   0.4 mg at 02/16/25 0310    Or    HYDROmorphone (Dilaudid) 1 MG/ML injection 0.8 mg  0.8 mg Intravenous Q2H PRN Tanmay Min MD   0.8 mg at 02/16/25 0623    HYDROcodone-acetaminophen (Norco)  MG per tab 1 tablet  1 tablet Oral Q4H PRN Tanmay Min MD        LORazepam (Ativan) tab 0.5 mg  0.5 mg Oral Q6H PRN Tanmay Min MD        ondansetron (Zofran) 4 MG/2ML injection 4 mg  4 mg Intravenous Q6H PRN Tanmay Min MD        sennosides (Senokot) tab 8.6 mg  8.6 mg Oral Nightly Tanmay Min MD        pantoprazole (Protonix) DR tab 40 mg  40 mg Oral QAM AC Tanmay Min MD        meclizine (Antivert) tab 12.5 mg  12.5 mg Oral TID PRN Tanmay Min MD        amphetamine-dextroamphetamine (Adderall) tab 5 mg  5 mg Oral TID Tanmay Min MD        sodium chloride 0.9% infusion   Intravenous Continuous Tanmay Min MD 75 mL/hr at 02/16/25 0314 New Bag at 02/16/25 0314    acetaminophen (Tylenol) tab 650 mg  650 mg Oral Q6H PRN Tanmay Min MD   650 mg at 02/15/25 2123     No current Epic-ordered outpatient medications on file.   [3]   Allergies  Allergen Reactions    Aspirin ITCHING

## 2025-02-17 LAB
ANION GAP SERPL CALC-SCNC: 9 MMOL/L (ref 0–18)
BASOPHILS # BLD AUTO: 0.04 X10(3) UL (ref 0–0.2)
BASOPHILS NFR BLD AUTO: 0.2 %
BUN BLD-MCNC: 14 MG/DL (ref 9–23)
BUN/CREAT SERPL: 22.2 (ref 10–20)
CALCIUM BLD-MCNC: 8.5 MG/DL (ref 8.7–10.4)
CHLORIDE SERPL-SCNC: 103 MMOL/L (ref 98–112)
CO2 SERPL-SCNC: 27 MMOL/L (ref 21–32)
CREAT BLD-MCNC: 0.63 MG/DL
DEPRECATED RDW RBC AUTO: 43.1 FL (ref 35.1–46.3)
EGFRCR SERPLBLD CKD-EPI 2021: 90 ML/MIN/1.73M2 (ref 60–?)
EOSINOPHIL # BLD AUTO: 0.01 X10(3) UL (ref 0–0.7)
EOSINOPHIL NFR BLD AUTO: 0.1 %
ERYTHROCYTE [DISTWIDTH] IN BLOOD BY AUTOMATED COUNT: 11.9 % (ref 11–15)
GLUCOSE BLD-MCNC: 101 MG/DL (ref 70–99)
HCT VFR BLD AUTO: 31.2 %
HGB BLD-MCNC: 10.1 G/DL
IMM GRANULOCYTES # BLD AUTO: 0.1 X10(3) UL (ref 0–1)
IMM GRANULOCYTES NFR BLD: 0.6 %
LYMPHOCYTES # BLD AUTO: 1.64 X10(3) UL (ref 1–4)
LYMPHOCYTES NFR BLD AUTO: 9.2 %
MCH RBC QN AUTO: 32 PG (ref 26–34)
MCHC RBC AUTO-ENTMCNC: 32.4 G/DL (ref 31–37)
MCV RBC AUTO: 98.7 FL
MONOCYTES # BLD AUTO: 1.37 X10(3) UL (ref 0.1–1)
MONOCYTES NFR BLD AUTO: 7.7 %
NEUTROPHILS # BLD AUTO: 14.71 X10 (3) UL (ref 1.5–7.7)
NEUTROPHILS # BLD AUTO: 14.71 X10(3) UL (ref 1.5–7.7)
NEUTROPHILS NFR BLD AUTO: 82.2 %
OSMOLALITY SERPL CALC.SUM OF ELEC: 289 MOSM/KG (ref 275–295)
PLATELET # BLD AUTO: 260 10(3)UL (ref 150–450)
POTASSIUM SERPL-SCNC: 3.8 MMOL/L (ref 3.5–5.1)
RBC # BLD AUTO: 3.16 X10(6)UL
SODIUM SERPL-SCNC: 139 MMOL/L (ref 136–145)
WBC # BLD AUTO: 17.9 X10(3) UL (ref 4–11)

## 2025-02-17 PROCEDURE — 85025 COMPLETE CBC W/AUTO DIFF WBC: CPT | Performed by: INTERNAL MEDICINE

## 2025-02-17 PROCEDURE — 80048 BASIC METABOLIC PNL TOTAL CA: CPT | Performed by: INTERNAL MEDICINE

## 2025-02-17 PROCEDURE — 97116 GAIT TRAINING THERAPY: CPT

## 2025-02-17 PROCEDURE — 97162 PT EVAL MOD COMPLEX 30 MIN: CPT

## 2025-02-17 RX ORDER — OXYCODONE HYDROCHLORIDE 5 MG/1
5 TABLET ORAL EVERY 6 HOURS PRN
Qty: 20 TABLET | Refills: 0 | Status: SHIPPED | OUTPATIENT
Start: 2025-02-17

## 2025-02-17 NOTE — PLAN OF CARE
Pt is A&O x2. Confused at times. Voiding via [place], removed this morning per protocol. WBAT. Abductor wedge in place. Given Tylenol prn for pain. D.C. plan is TBD pending for PT/OT tevin. Call light within reach. Safety precaution in place.    Problem: Patient Centered Care  Goal: Patient preferences are identified and integrated in the patient's plan of care  Description: Interventions:  - What would you like us to know as we care for you?   - Provide timely, complete, and accurate information to patient/family  - Incorporate patient and family knowledge, values, beliefs, and cultural backgrounds into the planning and delivery of care  - Encourage patient/family to participate in care and decision-making at the level they choose  - Honor patient and family perspectives and choices  Outcome: Progressing     Problem: Patient/Family Goals  Goal: Patient/Family Long Term Goal  Description: Patient's Long Term Goal:     Interventions:  - See additional Care Plan goals for specific interventions  Outcome: Progressing  Goal: Patient/Family Short Term Goal  Description: Patient's Short Term Goal:     Interventions:   -See additional Care Plan goals for specific interventions  Outcome: Progressing     Problem: PAIN - ADULT  Goal: Verbalizes/displays adequate comfort level or patient's stated pain goal  Description: INTERVENTIONS:  - Encourage pt to monitor pain and request assistance  - Assess pain using appropriate pain scale  - Administer analgesics based on type and severity of pain and evaluate response  - Implement non-pharmacological measures as appropriate and evaluate response  - Consider cultural and social influences on pain and pain management  - Manage/alleviate anxiety  - Utilize distraction and/or relaxation techniques  - Monitor for opioid side effects  - Notify MD/LIP if interventions unsuccessful or patient reports new pain  - Anticipate increased pain with activity and pre-medicate as appropriate  Outcome:  Progressing     Problem: SAFETY ADULT - FALL  Goal: Free from fall injury  Description: INTERVENTIONS:  - Assess pt frequently for physical needs  - Identify cognitive and physical deficits and behaviors that affect risk of falls.  - [place] fall precautions as indicated by assessment.  - Educate pt/family on patient safety including physical limitations  - Instruct pt to call for assistance with activity based on assessment  - Modify environment to reduce risk of injury  - Provide assistive devices as appropriate  - Consider OT/PT consult to assist with strengthening/mobility  - Encourage toileting schedule  Outcome: Progressing     Problem: DISCHARGE PLANNING  Goal: Discharge to home or other facility with appropriate resources  Description: INTERVENTIONS:  - Identify barriers to discharge w/pt and caregiver  - Include patient/family/discharge partner in discharge planning  - Arrange for needed discharge resources and transportation as appropriate  - Identify discharge learning needs (meds, wound care, etc)  - Arrange for interpreters to assist at discharge as needed  - Consider post-discharge preferences of patient/family/discharge partner  - Complete POLST form as appropriate  - Assess patient's ability to be responsible for managing their own health  - Refer to Case Management Department for coordinating discharge planning if the patient needs post-hospital services based on physician/LIP order or complex needs related to functional status, cognitive ability or social support system  Outcome: Progressing     Problem: MUSCULOSKELETAL - ADULT  Goal: Return mobility to safest level of function  Description: INTERVENTIONS:  - Assess patient stability and activity tolerance for standing, transferring and ambulating w/ or w/o assistive devices  - Assist with transfers and ambulation using safe patient handling equipment as needed  - Ensure adequate protection for wounds/incisions during mobilization  - Obtain PT/OT  consults as needed  - Advance activity as appropriate  - Communicate ordered activity level and limitations with patient/family  Outcome: Progressing     Problem: Impaired Functional Mobility  Goal: Achieve highest/safest level of mobility/gait  Description: Interventions:  - Assess patient's functional ability and stability  - Promote increasing activity/tolerance for mobility and gait  - Educate and engage patient/family in tolerated activity level and precautions  - Recommend use of  RW for transfers and ambulation  Outcome: Progressing

## 2025-02-17 NOTE — PROGRESS NOTES
[facility]  part of [facility]    Progress Note    Narciso Burnett Patient Status:  Inpatient    1945 MRN [id number]   Location [facility] PRE OP RECOVERY Attending Tanmay Min MD   Hosp Day # 2 PCP NILO STEEN       SUBJECTIVE  Feeling better.  No chest pain no shortness of breath.  Wants to go to rehab.      OBJECTIVE:  Vital signs in last 24 hours:  /69 (BP Location: Right arm)   Pulse 73   Temp 98.2 °F (36.8 °C) (Oral)   Resp 16   Wt 116 lb 9.6 oz (52.9 kg)   SpO2 93%   BMI 20.01 kg/m²     Intake/Output:    Intake/Output Summary (Last 24 hours) at 2025 1321  Last data filed at 2025 0627  Gross per 24 hour   Intake 110 ml   Output 1150 ml   Net -1040 ml       Wt Readings from Last 3 Encounters:   02/15/25 116 lb 9.6 oz (52.9 kg)   22 104 lb (47.2 kg)   20 106 lb (48.1 kg)       Exam  Gen: No acute distress, alert  HEENT: No pallor no icterus  Pulm: Lungs clear, normal respiratory effort  CV: Heart with regular rate and rhythm, no peripheral edema  Abd: Abdomen soft, nontender, nondistended, no organomegaly, bowel sounds present  MSK: Full range of motion in extremities, no clubbing, no cyanosis  Skin: no rashes or lesions  CNS: Alert    Data Review:     Labs:   Lab Results   Component Value Date    WBC 17.9 2025    HGB 10.1 2025    HCT 31.2 2025    .0 2025    CREATSERUM 0.63 2025    BUN 14 2025     2025    K 3.8 2025     2025    CO2 27.0 2025     2025    CA 8.5 2025       LABS  Recent Labs   Lab 02/15/25  0757 25  0518   RBC 4.08 3.16*   HGB 13.0 10.1*   HCT 39.4 31.2*   MCV 96.6 98.7   MCH 31.9 32.0   MCHC 33.0 32.4   RDW 12.0 11.9   NEPRELIM 7.45 14.71*   WBC 11.0 17.9*   .0 260.0   AST 21  --    ALT 11  --    * 101*       Imaging:      Meds:   Current Facility-Administered Medications   Medication Dose  Route Frequency    sodium chloride 0.9 % IV bolus 500 mL  500 mL Intravenous Once PRN    sennosides (Senokot) tab 17.2 mg  17.2 mg Oral Nightly    docusate sodium (Colace) cap 100 mg  100 mg Oral BID    polyethylene glycol (PEG 3350) (Miralax) 17 g oral packet 17 g  17 g Oral Daily PRN    magnesium hydroxide (Milk of Magnesia) 400 MG/5ML oral suspension 30 mL  30 mL Oral Daily PRN    bisacodyl (Dulcolax) 10 MG rectal suppository 10 mg  10 mg Rectal Daily PRN    fleet enema (Fleet) rectal enema 133 mL  1 enema Rectal Once PRN    ondansetron (Zofran) 4 MG/2ML injection 4 mg  4 mg Intravenous Q6H PRN    metoclopramide (Reglan) 5 mg/mL injection 10 mg  10 mg Intravenous Q8H PRN    diphenhydrAMINE (Benadryl) cap/tab 25 mg  25 mg Oral Q4H PRN    Or    diphenhydrAMINE (Benadryl) 50 mg/mL  injection 12.5 mg  12.5 mg Intravenous Q4H PRN    apixaban (Eliquis) tab 2.5 mg  2.5 mg Oral BID@0600,1800    oxyCODONE immediate release tab 2.5 mg  2.5 mg Oral Q4H PRN    Or    oxyCODONE immediate release tab 5 mg  5 mg Oral Q4H PRN    HYDROmorphone (Dilaudid) 1 MG/ML injection 0.2 mg  0.2 mg Intravenous Q2H PRN    Or    HYDROmorphone (Dilaudid) 1 MG/ML injection 0.4 mg  0.4 mg Intravenous Q2H PRN    Or    HYDROmorphone (Dilaudid) 1 MG/ML injection 0.8 mg  0.8 mg Intravenous Q2H PRN    LORazepam (Ativan) tab 0.5 mg  0.5 mg Oral Q6H PRN    ondansetron (Zofran) 4 MG/2ML injection 4 mg  4 mg Intravenous Q6H PRN    sennosides (Senokot) tab 8.6 mg  8.6 mg Oral Nightly    pantoprazole (Protonix) DR tab 40 mg  40 mg Oral QAM AC    meclizine (Antivert) tab 12.5 mg  12.5 mg Oral TID PRN    amphetamine-dextroamphetamine (Adderall) tab 5 mg  5 mg Oral TID    sodium chloride 0.9% infusion   Intravenous Continuous    acetaminophen (Tylenol) tab 650 mg  650 mg Oral Q6H PRN       Assessment  Patient Active Problem List   Diagnosis    Acute bronchitis with COPD (HCC)    Acute respiratory failure with hypoxia (HCC)    Hypoxemia    Weakness generalized     Dysphagia, unspecified type    Weight loss    Hyponatremia    TIA involving carotid artery    Attention deficit disorder (ADD) without hyperactivity    Moderate protein-calorie malnutrition (HCC)    Closed displaced fracture of right femoral neck (HCC)     Closed displaced fracture of right femoral neck (HCC)  Status post surgery.  Leukocytosis.  Could be from the surgery.  Patient is afebrile.          Cervical spinal stenosis.     Will continue the patient on   Current pain regimen.        Will continue physical therapy and Occupational Therapy.     Discussed with the nursing staff.  Discussed with the emergency room physician.             Active Orders   Transfer     Bed request Once       Frequency: Once       Number of Occurrences: 1 Occurrences   Consult     ED Consult to Orthopedic Surgery       Frequency: Once             Plan:     Continue current treatment.  Discussed with the nursing staff.       Active Orders   LAB    Basic Metabolic Panel (8)     Frequency: Tomorrow AM draw     Number of Occurrences: 1 Occurrences    CBC With Differential With Platelet     Frequency: Tomorrow AM draw     Number of Occurrences: 1 Occurrences   Nourishments    Room Service Eligibility Until Discontinued     Frequency: Until Discontinued     Number of Occurrences: Until Specified    Room Service Notify RN Until Discontinued     Frequency: Until Discontinued     Number of Occurrences: Until Specified   OT    OT eval and treat     Frequency: Once     Number of Occurrences: 1 Occurrences   Respiratory Care    Incentive spriometry: RT to teach pt Once     Frequency: Once     Number of Occurrences: 1 Occurrences   Precaution    Hip precautions Continuous     Frequency: Continuous     Number of Occurrences: Until Specified   Diet    Regular/General diet Is Patient on Accuchecks? No     Frequency: Effective Now     Number of Occurrences: Until Specified   Nursing    Change dressing to affected leg POD#3 or prior to Discharge      Frequency: Q Shift     Number of Occurrences: Until Specified    Early ambulation if no contraindications     Frequency: Continuous     Number of Occurrences: Until Specified    If patient on anticoagulants notify physician of:     Frequency: Until Discontinued     Number of Occurrences: Until Specified    Incentive spirometry     Frequency: 10X/PER HR AWAKE     Number of Occurrences: Until Specified    Initiate electrolyte protocol     Frequency: Until Discontinued     Number of Occurrences: Until Specified    Initiate Oxygen Protocol     Frequency: Until Discontinued     Number of Occurrences: Until Specified    Insert nazario catheter     Frequency: Once     Number of Occurrences: 1 Occurrences    Intake and Output     Frequency: Q Shift     Number of Occurrences: Until Specified    Intermittent Straight Cath Protocol for Acute Urinary Retention     Frequency: Until Discontinued     Number of Occurrences: Until Specified    Neurovascular checks     Frequency: Q4H     Number of Occurrences: Until Specified    Notify physician     Frequency: PRN     Number of Occurrences: Until Specified    Nurse Driven Indwelling Urinary Catheter Removal Protocol     Frequency: Until Discontinued     Number of Occurrences: Until Specified    Nurse Driven Indwelling Urinary Catheter Removal Protocol     Frequency: Until Discontinued     Number of Occurrences: Until Specified    Place sequential compression device     Frequency: Continuous     Number of Occurrences: Until Specified    Place sequential compression device     Frequency: Continuous     Number of Occurrences: Until Specified     Order Comments: Except while ambulating.      Place DAWOOD hose Knee high     Frequency: Continuous     Number of Occurrences: Until Specified    Pulse oximetry     Frequency: Spot Check     Number of Occurrences: Until Specified     Order Comments: With vital signs      Saline lock PIV when tolerating PO intake     Frequency: Once     Number of  Occurrences: 1 Occurrences    Up to chair Once     Frequency: Once     Number of Occurrences: 1 Occurrences     Order Comments: Tonight, POD#0      Up with assistance PRN     Frequency: PRN     Number of Occurrences: Until Specified    Vital signs     Frequency: Per Unit Routine     Number of Occurrences: 2 Hours     Order Comments: ED holding order only  Cancel if other admission orders exist!        Vital signs     Frequency: Per Unit Routine     Number of Occurrences: Until Specified     Order Comments: Every 4 hours for 24 hours, then unit routine      Weight bearing as tolerated     Frequency: Until Discontinued     Number of Occurrences: Until Specified    Weight bearing as tolerated     Frequency: Until Discontinued     Number of Occurrences: Until Specified   Consult    Social work     Frequency: Once     Number of Occurrences: 1 Occurrences   Medications    acetaminophen (Tylenol) tab 650 mg     Frequency: Q6H PRN     Dose: 650 mg     Route: Oral    amphetamine-dextroamphetamine (Adderall) tab 5 mg     Frequency: TID     Dose: 5 mg     Route: Oral    apixaban (Eliquis) tab 2.5 mg     Frequency: BID@0600,1800     Dose: 2.5 mg     Route: Oral    bisacodyl (Dulcolax) 10 MG rectal suppository 10 mg     Frequency: Daily PRN     Dose: 10 mg     Route: Rectal    diphenhydrAMINE (Benadryl) 50 mg/mL  injection 12.5 mg     Linked Order: Or     Frequency: Q4H PRN     Dose: 12.5 mg     Route: Intravenous    diphenhydrAMINE (Benadryl) cap/tab 25 mg     Linked Order: Or     Frequency: Q4H PRN     Dose: 25 mg     Route: Oral    docusate sodium (Colace) cap 100 mg     Frequency: BID     Dose: 100 mg     Route: Oral    fleet enema (Fleet) rectal enema 133 mL     Frequency: Once PRN     Dose: 1 enema     Route: Rectal    HYDROmorphone (Dilaudid) 1 MG/ML injection 0.2 mg     Linked Order: Or     Frequency: Q2H PRN     Dose: 0.2 mg     Route: Intravenous    HYDROmorphone (Dilaudid) 1 MG/ML injection 0.4 mg     Linked Order: Or      Frequency: Q2H PRN     Dose: 0.4 mg     Route: Intravenous    HYDROmorphone (Dilaudid) 1 MG/ML injection 0.8 mg     Linked Order: Or     Frequency: Q2H PRN     Dose: 0.8 mg     Route: Intravenous    LORazepam (Ativan) tab 0.5 mg     Frequency: Q6H PRN     Dose: 0.5 mg     Route: Oral    magnesium hydroxide (Milk of Magnesia) 400 MG/5ML oral suspension 30 mL     Frequency: Daily PRN     Dose: 30 mL     Route: Oral    meclizine (Antivert) tab 12.5 mg     Frequency: TID PRN     Dose: 12.5 mg     Route: Oral    metoclopramide (Reglan) 5 mg/mL injection 10 mg     Frequency: Q8H PRN     Dose: 10 mg     Route: Intravenous    ondansetron (Zofran) 4 MG/2ML injection 4 mg     Frequency: Q6H PRN     Dose: 4 mg     Route: Intravenous    ondansetron (Zofran) 4 MG/2ML injection 4 mg     Frequency: Q6H PRN     Dose: 4 mg     Route: Intravenous    oxyCODONE immediate release tab 2.5 mg     Linked Order: Or     Frequency: Q4H PRN     Dose: 2.5 mg     Route: Oral    oxyCODONE immediate release tab 5 mg     Linked Order: Or     Frequency: Q4H PRN     Dose: 5 mg     Route: Oral    pantoprazole (Protonix) DR tab 40 mg     Frequency: QAM AC     Dose: 40 mg     Route: Oral    polyethylene glycol (PEG 3350) (Miralax) 17 g oral packet 17 g     Frequency: Daily PRN     Dose: 17 g     Route: Oral    sennosides (Senokot) tab 17.2 mg     Frequency: Nightly     Dose: 17.2 mg     Route: Oral    sennosides (Senokot) tab 8.6 mg     Frequency: Nightly     Dose: 8.6 mg     Route: Oral    sodium chloride 0.9 % IV bolus 500 mL     Frequency: Once PRN     Dose: 500 mL     Route: Intravenous    sodium chloride 0.9% infusion     Frequency: Continuous     Route: Intravenous     Tanmay Min MD  2/16/2025

## 2025-02-17 NOTE — PROGRESS NOTES
Pt sitting up in chair comfortably. Notes her pain is not too bad.    RLE: Dressing CDI, comp soft, DF/PF/EHL intact, SILT cap refill brisk    A/P 80 yo F s/p R hip lisa POD 1  WBAT RLE  Posterior hip precautions  Pain control  DVT proph  Ortho stable for discharge. F/u with Dr. Lazaro in 7-10 days upon discharge..

## 2025-02-17 NOTE — PLAN OF CARE
Problem: PAIN - ADULT  Goal: Verbalizes/displays adequate comfort level or patient's stated pain goal  Description: INTERVENTIONS:  - Encourage pt to monitor pain and request assistance  - Assess pain using appropriate pain scale  - Administer analgesics based on type and severity of pain and evaluate response  - Implement non-pharmacological measures as appropriate and evaluate response  - Consider cultural and social influences on pain and pain management  - Manage/alleviate anxiety  - Utilize distraction and/or relaxation techniques  - Monitor for opioid side effects  - Notify MD/LIP if interventions unsuccessful or patient reports new pain  - Anticipate increased pain with activity and pre-medicate as appropriate  Outcome: Progressing     Problem: SAFETY ADULT - FALL  Goal: Free from fall injury  Description: INTERVENTIONS:  - Assess pt frequently for physical needs  - Identify cognitive and physical deficits and behaviors that affect risk of falls.  - [place] fall precautions as indicated by assessment.  - Educate pt/family on patient safety including physical limitations  - Instruct pt to call for assistance with activity based on assessment  - Modify environment to reduce risk of injury  - Provide assistive devices as appropriate  - Consider OT/PT consult to assist with strengthening/mobility  - Encourage toileting schedule  Outcome: Progressing     Problem: GENITOURINARY - ADULT  Goal: Absence of urinary retention  Description: INTERVENTIONS:  - Assess patient’s ability to void and empty bladder  - Monitor intake/output and perform bladder scan as needed  - Follow urinary retention protocol/standard of care  - Consider collaborating with pharmacy to review patient's medication profile  - Implement strategies to promote bladder emptying  Outcome: Progressing  Patient remains alert and oriented x2-3. Frequent re-orientation provided. Aquacel dressing to R hip intact. Tylenol and ice packs given as needed.  Patient was oob to the chair with 2 assist/walker. Lift used for transfer patient back to bed. Posterior hip precautions and fall precautions in place. Patient was unable to urinate after the nazario cath removal. Bladder scan 123mls. Continue IV fluid,and encouraged pt to increase PO intake.   Pt's  undated on POC.   Plan: KRISHNA   Purse String (Simple) Text: Given the location of the defect and the characteristics of the surrounding skin a purse string closure was deemed most appropriate.  Undermining was performed circumfirentially around the surgical defect.  A purse string suture was then placed and tightened.

## 2025-02-17 NOTE — PHYSICAL THERAPY NOTE
PHYSICAL THERAPY HIP EVALUATION - INPATIENT     Room Number: 414/414-A  Evaluation Date: 2/17/2025  Type of Evaluation: Initial  Physician Order: PT Eval and Treat    Presenting Problem: R hip hemiathroplasty  Co-Morbidities : TIA, Dyspnea, ARF, Hypoxia  Reason for Therapy: Mobility Dysfunction and Discharge Planning    PHYSICAL THERAPY ASSESSMENT   Patient is a 79 year old female admitted 2/15/2025 for RIGHT HIP HEMIARTHROPLASTY .  Prior to admission, patient's baseline is Lives at home with spouse mod indep with a RW retired.  Patient is currently functioning below baseline with bed mobility, transfers, gait, stair negotiation, maintaining seated position, standing prolonged periods, and performing household tasks.  Patient is requiring moderate assist as a result of the following impairments: decreased functional strength, decreased endurance/aerobic capacity, pain, impaired standing balance, impaired motor planning, decreased muscular endurance, and medical status.  Physical Therapy will continue to follow for duration of hospitalization.    Patient will benefit from continued skilled PT Services to promote return to prior level of function and safety with continuous assistance and gradual rehabilitative therapy .    PLAN DURING HOSPITALIZATION  Nursing Mobility Recommendation : 1 Assist  PT Device Recommendation: Rolling walker  PT Treatment Plan: Bed mobility;Body mechanics;Endurance;Energy conservation;Patient education;Gait training;Strengthening;Transfer training;Balance training;Stoop training  Rehab Potential : Good  Frequency (Obs): Daily     PHYSICAL THERAPY MEDICAL/SOCIAL HISTORY   History related to current admission: RIGHT HIP HEMIARTHROPLASTY      Problem List  Principal Problem:    Closed displaced fracture of right femoral neck (HCC)      HOME SITUATION  Type of Home: House  Home Layout: One level;Able to live on main level (ranch home)  Stairs to Enter : 2   Railing: Yes              Lives With:  Spouse    Drives: No         Prior Level of [place]: Pt lives with spouse mod indep with all mobility and ADL's with a RW    SUBJECTIVE  I feel ok just a little weak and unsteady with trying to get to a chair or walk.     PHYSICAL THERAPY EXAMINATION   OBJECTIVE  Precautions: MARYLU - posterior;Limb alert - right  Fall Risk: Standard fall risk    WEIGHT BEARING RESTRICTION  R Lower Extremity: Weight Bearing as Tolerated      PAIN ASSESSMENT  Rating: 3  Location: R LE  Management Techniques: Activity promotion;Body mechanics;Breathing techniques;Relaxation;Repositioning    COGNITION  Overall Cognitive Status:  WFL - within functional limits    RANGE OF MOTION AND STRENGTH ASSESSMENT  Upper extremity ROM and strength are within functional limits   Lower extremity ROM is within functional limits   Lower extremity strength is within functional limits RLE 4-/5    BALANCE  Static Sitting: Fair  Dynamic Sitting: Fair -  Static Standing: Poor +  Dynamic Standing: Poor    ACTIVITY TOLERANCE  Pulse: 77  Heart Rate Source: Monitor  Resp: 16  BP: 146/69  BP Location: Right arm  BP Method: Automatic  Patient Position: Sitting    O2 WALK  Oxygen Therapy  SPO2% Ambulation on Oxygen: 96    AM-PAC '6-Clicks' INPATIENT SHORT FORM - BASIC MOBILITY  How much difficulty does the patient currently have...  Patient Difficulty: Turning over in bed (including adjusting bedclothes, sheets and blankets)?: A Lot   Patient Difficulty: Sitting down on and standing up from a chair with arms (e.g., wheelchair, bedside commode, etc.): A Lot   Patient Difficulty: Moving from lying on back to sitting on the side of the bed?: A Lot   How much help from another person does the patient currently need...   Help from Another: Moving to and from a bed to a chair (including a wheelchair)?: A Lot   Help from Another: Need to walk in hospital room?: A Lot   Help from Another: Climbing 3-5 steps with a railing?: Total     AM-PAC Score:  Raw Score: 11    Approx Degree of Impairment: 72.57%   Standardized Score (AM-PAC Scale): 33.86   CMS Modifier (G-Code): CL    FUNCTIONAL ABILITY STATUS  Functional Mobility/Gait Assessment  Gait Assistance: Moderate assistance  Distance (ft): 4  Assistive Device: Rolling walker  Pattern: Shuffle;R Decreased stance time;R Foot flat (decreased step length and cadenc unsteady gait)  Rolling: moderate assist  Supine to Sit: moderate assist  Sit to Supine: moderate assist  Sit to Stand: moderate assist    Exercise/Education Provided:  Bed mobility  Body mechanics  Energy conservation  Functional activity tolerated  Gait training  Posture  Strengthening  Lower therapeutic exercise:  Ankle pumps  Heel slides  LAQ  Transfer training    Skilled Therapy Provided: PT ed with bed mobility and transfers with mod A with RW 4 shuffling steps and up to a chair with mod a for transfers. Pt ed with therex in chair x 10 reps as per HEP. Pt presents as slightly retropulsive with limited balance. Pt is on track fro GR with PT, OT to regain her maximal PLOF and safety as medical progress allows.     The patient's Approx Degree of Impairment: 72.57% has been calculated based on documentation in the [facility] '6 clicks' Inpatient Basic Mobility Short Form.  Research supports that patients with this level of impairment may benefit from GR with PT, OT as medical progress allows.    Final disposition will be made by interdisciplinary medical team.    Patient End of Session: Up in chair;With  staff;Needs met;Call light within reach;RN aware of session/findings;All patient questions and concerns addressed;Alarm set    CURRENT GOALS  Goals to be met by: 2/30/2025  Patient Goal Patient's self-stated goal is: Return home    Goal #1 Patient is able to demonstrate supine - sit EOB @ level: modified independent   Goal #1   Current Status    Goal #2 Patient is able to demonstrate transfers Sit to/from Stand at assistance level: modified independent     Goal  #2  Current Status    Goal #3 Patient is able to ambulate 300 feet with assistive device at assistance level: modified independent    Goal #3   Current Status    Goal #4 Patient will negotiate 2 stairs/one curb w/ assistive device and supervision   Goal #4   Current Status    Goal #5 Patient verbalizes and/or demonstrates all precautions and safety concerns independently   Goal #5   Current Status    Goal #6 Patient independently performs home exercise program for ROM/strengthening per the instructions provided in preparation for discharge.   Goal #6  Current Status      Patient Evaluation Complexity Level:  History Moderate - 1 or 2 personal factors and/or co-morbidities   Examination of body systems Moderate - addressing a total of 3 or more elements   Clinical Presentation  Moderate - Evolving   Clinical Decision Making  Moderate Complexity     Gait Training: 10 minutes

## 2025-02-17 NOTE — CM/SW NOTE
02/17/25 1700   CM/SW Referral Data   Referral Source Physician;Social Work (self-referral)   Reason for Referral Discharge planning   Informant Spouse/Significant Other   Medical Hx   Does patient have an established PCP? Yes   Patient Info   Patient's Current Mental Status at Time of Assessment Alert;Confused or unable to complete assessment;Memory Impairments   Patient's Home Environment House   Number of Levels in Home 1   Patient lives with Spouse/Significant other   Patient Status Prior to Admission   Independent with ADLs and Mobility Yes   Discharge Needs   Anticipated D/C needs Subacute rehab   Services Requested   Submitted to [facility] Yes   PASRR Level 1 Submitted Yes   PMR Consult Requested Not clinically appropriate at this time   Choice of Post-Acute Provider   Informed patient of right to choose their preferred provider Yes   List of appropriate post-acute services provided to patient/family with quality data Yes   Patient/family choice [facility]     SW called and spoke with Spouse regarding SNF list    Spouse is requesting [facility] - if [facility] cannot accept will need to discuss back up choice. Spouse hoping for a facility that's close by their house    Will need to follow up with more choices    PLAN: [facility]LO [facility] - pending [facility] vs back up choice    Connie EAGLE LSW, MSW ext. [phone]

## 2025-02-17 NOTE — CM/SW NOTE
Department  notified of request for ke[facility] referrals started. Assigned CM/SW to follow up with pt/family on further discharge planning.     Precious Bolton, DSC

## 2025-02-18 LAB
ANION GAP SERPL CALC-SCNC: 9 MMOL/L (ref 0–18)
BASOPHILS # BLD AUTO: 0.06 X10(3) UL (ref 0–0.2)
BASOPHILS NFR BLD AUTO: 0.5 %
BUN BLD-MCNC: 9 MG/DL (ref 9–23)
BUN/CREAT SERPL: 18.8 (ref 10–20)
CALCIUM BLD-MCNC: 8.1 MG/DL (ref 8.7–10.4)
CHLORIDE SERPL-SCNC: 107 MMOL/L (ref 98–112)
CO2 SERPL-SCNC: 22 MMOL/L (ref 21–32)
CREAT BLD-MCNC: 0.48 MG/DL
DEPRECATED RDW RBC AUTO: 43.7 FL (ref 35.1–46.3)
EGFRCR SERPLBLD CKD-EPI 2021: 96 ML/MIN/1.73M2 (ref 60–?)
EOSINOPHIL # BLD AUTO: 0.11 X10(3) UL (ref 0–0.7)
EOSINOPHIL NFR BLD AUTO: 0.9 %
ERYTHROCYTE [DISTWIDTH] IN BLOOD BY AUTOMATED COUNT: 12 % (ref 11–15)
GLUCOSE BLD-MCNC: 96 MG/DL (ref 70–99)
HCT VFR BLD AUTO: 27.3 %
HGB BLD-MCNC: 9.3 G/DL
IMM GRANULOCYTES # BLD AUTO: 0.06 X10(3) UL (ref 0–1)
IMM GRANULOCYTES NFR BLD: 0.5 %
LYMPHOCYTES # BLD AUTO: 2.1 X10(3) UL (ref 1–4)
LYMPHOCYTES NFR BLD AUTO: 16.9 %
MCH RBC QN AUTO: 33.5 PG (ref 26–34)
MCHC RBC AUTO-ENTMCNC: 34.1 G/DL (ref 31–37)
MCV RBC AUTO: 98.2 FL
MONOCYTES # BLD AUTO: 1.03 X10(3) UL (ref 0.1–1)
MONOCYTES NFR BLD AUTO: 8.3 %
NEUTROPHILS # BLD AUTO: 9.04 X10 (3) UL (ref 1.5–7.7)
NEUTROPHILS # BLD AUTO: 9.04 X10(3) UL (ref 1.5–7.7)
NEUTROPHILS NFR BLD AUTO: 72.9 %
OSMOLALITY SERPL CALC.SUM OF ELEC: 285 MOSM/KG (ref 275–295)
PLATELET # BLD AUTO: 238 10(3)UL (ref 150–450)
POTASSIUM SERPL-SCNC: 3.8 MMOL/L (ref 3.5–5.1)
RBC # BLD AUTO: 2.78 X10(6)UL
SODIUM SERPL-SCNC: 138 MMOL/L (ref 136–145)
WBC # BLD AUTO: 12.4 X10(3) UL (ref 4–11)

## 2025-02-18 PROCEDURE — 85025 COMPLETE CBC W/AUTO DIFF WBC: CPT | Performed by: INTERNAL MEDICINE

## 2025-02-18 PROCEDURE — 80048 BASIC METABOLIC PNL TOTAL CA: CPT | Performed by: INTERNAL MEDICINE

## 2025-02-18 PROCEDURE — 97116 GAIT TRAINING THERAPY: CPT

## 2025-02-18 PROCEDURE — 97110 THERAPEUTIC EXERCISES: CPT

## 2025-02-18 PROCEDURE — 97535 SELF CARE MNGMENT TRAINING: CPT

## 2025-02-18 PROCEDURE — 97166 OT EVAL MOD COMPLEX 45 MIN: CPT

## 2025-02-18 NOTE — CONGREGATE LIVING REVIEW
[facility] Living Authorization    The [facility] Review Committee has reviewed this case and the patient IS APPROVED for discharge to a facility for Short Term Skilled once the following procedure is followed:     - The physician discharge instructions (contained within the [facility] note for SNF) must inlcude the below appropriate and approved COVID instructions to the facility    For questions regarding [facility]LR[facility] approval process, please contact the CM assigned to the case.  For questions regarding RN discharge workflow, please contact the unit Clinical Leader.

## 2025-02-18 NOTE — PLAN OF CARE
Alert and oriented x3. 2 assist walker. Max assist lift. Purewick in place but not voiding. Bladder scanned, will need straight cath. Oxycodone for pain. Plan for [facility] once cleared.  Problem: Patient Centered Care  Goal: Patient preferences are identified and integrated in the patient's plan of care  Description: Interventions:  - What would you like us to know as we care for you? Home with spouse  - Provide timely, complete, and accurate information to patient/family  - Incorporate patient and family knowledge, values, beliefs, and cultural backgrounds into the planning and delivery of care  - Encourage patient/family to participate in care and decision-making at the level they choose  - Honor patient and family perspectives and choices  Outcome: Progressing     Problem: Patient/Family Goals  Goal: Patient/Family Long Term Goal  Description: Patient's Long Term Goal: pain management    Interventions:  - pain administration  - See additional Care Plan goals for specific interventions  Outcome: Progressing  Goal: Patient/Family Short Term Goal  Description: Patient's Short Term Goal: calling for assistance out of bed    Interventions:   - safe ambulation  - See additional Care Plan goals for specific interventions  Outcome: Progressing     Problem: PAIN - ADULT  Goal: Verbalizes/displays adequate comfort level or patient's stated pain goal  Description: INTERVENTIONS:  - Encourage pt to monitor pain and request assistance  - Assess pain using appropriate pain scale  - Administer analgesics based on type and severity of pain and evaluate response  - Implement non-pharmacological measures as appropriate and evaluate response  - Consider cultural and social influences on pain and pain management  - Manage/alleviate anxiety  - Utilize distraction and/or relaxation techniques  - Monitor for opioid side effects  - Notify MD/LIP if interventions unsuccessful or patient reports new pain  - Anticipate increased pain  with activity and pre-medicate as appropriate  Outcome: Progressing     Problem: SAFETY ADULT - FALL  Goal: Free from fall injury  Description: INTERVENTIONS:  - Assess pt frequently for physical needs  - Identify cognitive and physical deficits and behaviors that affect risk of falls.  - [place] fall precautions as indicated by assessment.  - Educate pt/family on patient safety including physical limitations  - Instruct pt to call for assistance with activity based on assessment  - Modify environment to reduce risk of injury  - Provide assistive devices as appropriate  - Consider OT/PT consult to assist with strengthening/mobility  - Encourage toileting schedule  Outcome: Progressing     Problem: DISCHARGE PLANNING  Goal: Discharge to home or other facility with appropriate resources  Description: INTERVENTIONS:  - Identify barriers to discharge w/pt and caregiver  - Include patient/family/discharge partner in discharge planning  - Arrange for needed discharge resources and transportation as appropriate  - Identify discharge learning needs (meds, wound care, etc)  - Arrange for interpreters to assist at discharge as needed  - Consider post-discharge preferences of patient/family/discharge partner  - Complete POLST form as appropriate  - Assess patient's ability to be responsible for managing their own health  - Refer to Case Management Department for coordinating discharge planning if the patient needs post-hospital services based on physician/LIP order or complex needs related to functional status, cognitive ability or social support system  Outcome: Progressing     Problem: MUSCULOSKELETAL - ADULT  Goal: Return mobility to safest level of function  Description: INTERVENTIONS:  - Assess patient stability and activity tolerance for standing, transferring and ambulating w/ or w/o assistive devices  - Assist with transfers and ambulation using safe patient handling equipment as needed  - Ensure adequate protection  for wounds/incisions during mobilization  - Obtain PT/OT consults as needed  - Advance activity as appropriate  - Communicate ordered activity level and limitations with patient/family  Outcome: Progressing     Problem: Impaired Functional Mobility  Goal: Achieve highest/safest level of mobility/gait  Description: Interventions:  - Assess patient's functional ability and stability  - Promote increasing activity/tolerance for mobility and gait  - Educate and engage patient/family in tolerated activity level and precautions    Outcome: Progressing     Problem: GENITOURINARY - ADULT  Goal: Absence of urinary retention  Description: INTERVENTIONS:  - Assess patient’s ability to void and empty bladder  - Monitor intake/output and perform bladder scan as needed  - Follow urinary retention protocol/standard of care  - Consider collaborating with pharmacy to review patient's medication profile  - Implement strategies to promote bladder emptying  Outcome: Progressing

## 2025-02-18 NOTE — PROGRESS NOTES
[facility]  part of [facility]    Progress Note    Narciso Burnett Patient Status:  Inpatient    1945 MRN [id number]   Location [facility] PRE OP RECOVERY Attending Tanmay Min MD   Hosp Day # 3 PCP NILO STEEN       SUBJECTIVE  Feeling better.  Pain is controlled.  Nursing staff reports no complaints.  Patient wants to go to subacute rehab      OBJECTIVE:  Vital signs in last 24 hours:  /72 (BP Location: Right arm)   Pulse 81   Temp 98.4 °F (36.9 °C) (Oral)   Resp 18   Wt 116 lb 9.6 oz (52.9 kg)   SpO2 92%   BMI 20.01 kg/m²     Intake/Output:    Intake/Output Summary (Last 24 hours) at 2025 0800  Last data filed at 2025 0521  Gross per 24 hour   Intake 1290.5 ml   Output 900 ml   Net 390.5 ml       Wt Readings from Last 3 Encounters:   02/15/25 116 lb 9.6 oz (52.9 kg)   22 104 lb (47.2 kg)   20 106 lb (48.1 kg)       Exam  Gen: No acute distress, alert  HEENT: No pallor no icterus  Pulm: Lungs clear, normal respiratory effort  CV: Heart with regular rate and rhythm, no peripheral edema  Abd: Abdomen soft, nontender, nondistended, no organomegaly, bowel sounds present  MSK: Full range of motion in extremities, no clubbing, no cyanosis  Skin: no rashes or lesions  CNS: Alert    Data Review:     Labs:   Lab Results   Component Value Date    WBC 12.4 2025    HGB 9.3 2025    HCT 27.3 2025    .0 2025    CREATSERUM 0.48 2025    BUN 9 2025     2025    K 3.8 2025     2025    CO2 22.0 2025    GLU 96 2025    CA 8.1 2025       LABS  Recent Labs   Lab 02/15/25  0757 25  0518 25  0436 25  0726   RBC 4.08 3.16*  --  2.78*   HGB 13.0 10.1*  --  9.3*   HCT 39.4 31.2*  --  27.3*   MCV 96.6 98.7  --  98.2   MCH 31.9 32.0  --  33.5   MCHC 33.0 32.4  --  34.1   RDW 12.0 11.9  --  12.0   NEPRELIM 7.45 14.71*  --  9.04*   WBC 11.0 17.9*  --   12.4*   .0 260.0  --  238.0   AST 21  --   --   --    ALT 11  --   --   --    * 101* 96  --        Imaging:      Meds:   Current Facility-Administered Medications   Medication Dose Route Frequency    sennosides (Senokot) tab 17.2 mg  17.2 mg Oral Nightly    docusate sodium (Colace) cap 100 mg  100 mg Oral BID    polyethylene glycol (PEG 3350) (Miralax) 17 g oral packet 17 g  17 g Oral Daily PRN    magnesium hydroxide (Milk of Magnesia) 400 MG/5ML oral suspension 30 mL  30 mL Oral Daily PRN    bisacodyl (Dulcolax) 10 MG rectal suppository 10 mg  10 mg Rectal Daily PRN    fleet enema (Fleet) rectal enema 133 mL  1 enema Rectal Once PRN    ondansetron (Zofran) 4 MG/2ML injection 4 mg  4 mg Intravenous Q6H PRN    metoclopramide (Reglan) 5 mg/mL injection 10 mg  10 mg Intravenous Q8H PRN    diphenhydrAMINE (Benadryl) cap/tab 25 mg  25 mg Oral Q4H PRN    Or    diphenhydrAMINE (Benadryl) 50 mg/mL  injection 12.5 mg  12.5 mg Intravenous Q4H PRN    apixaban (Eliquis) tab 2.5 mg  2.5 mg Oral BID@0600,1800    oxyCODONE immediate release tab 2.5 mg  2.5 mg Oral Q4H PRN    Or    oxyCODONE immediate release tab 5 mg  5 mg Oral Q4H PRN    HYDROmorphone (Dilaudid) 1 MG/ML injection 0.2 mg  0.2 mg Intravenous Q2H PRN    Or    HYDROmorphone (Dilaudid) 1 MG/ML injection 0.4 mg  0.4 mg Intravenous Q2H PRN    Or    HYDROmorphone (Dilaudid) 1 MG/ML injection 0.8 mg  0.8 mg Intravenous Q2H PRN    LORazepam (Ativan) tab 0.5 mg  0.5 mg Oral Q6H PRN    ondansetron (Zofran) 4 MG/2ML injection 4 mg  4 mg Intravenous Q6H PRN    sennosides (Senokot) tab 8.6 mg  8.6 mg Oral Nightly    pantoprazole (Protonix) DR tab 40 mg  40 mg Oral QAM AC    meclizine (Antivert) tab 12.5 mg  12.5 mg Oral TID PRN    amphetamine-dextroamphetamine (Adderall) tab 5 mg  5 mg Oral TID    acetaminophen (Tylenol) tab 650 mg  650 mg Oral Q6H PRN       Assessment  Patient Active Problem List   Diagnosis    Acute bronchitis with COPD (HCC)    Acute  respiratory failure with hypoxia (HCC)    Hypoxemia    Weakness generalized    Dysphagia, unspecified type    Weight loss    Hyponatremia    TIA involving carotid artery    Attention deficit disorder (ADD) without hyperactivity    Moderate protein-calorie malnutrition (HCC)    Closed displaced fracture of right femoral neck (HCC)     Closed displaced fracture of right femoral neck (HCC)  Status post surgery.  Leukocytosis.  Could be from the surgery.  Patient is afebrile.    Much improved today.            Cervical spinal stenosis.     Will continue the patient on   Current pain regimen.        Will continue physical therapy and Occupational Therapy.     Discussed with the nursing staff.    Discharge planning to subacute rehab once accepted             Active Orders   Transfer     Bed request Once       Frequency: Once       Number of Occurrences: 1 Occurrences   Consult     ED Consult to Orthopedic Surgery       Frequency: Once             Plan:     Continue current treatment.  Discussed with the nursing staff.       Active Orders   LAB    Basic Metabolic Panel (8)     Frequency: Tomorrow AM draw     Number of Occurrences: 1 Occurrences    CBC With Differential With Platelet     Frequency: Tomorrow AM draw     Number of Occurrences: 1 Occurrences   Nourishments    Room Service Eligibility Until Discontinued     Frequency: Until Discontinued     Number of Occurrences: Until Specified    Room Service Notify RN Until Discontinued     Frequency: Until Discontinued     Number of Occurrences: Until Specified   OT    OT eval and treat     Frequency: Once     Number of Occurrences: 1 Occurrences   Respiratory Care    Incentive spriometry: RT to teach pt Once     Frequency: Once     Number of Occurrences: 1 Occurrences   Precaution    Hip precautions Continuous     Frequency: Continuous     Number of Occurrences: Until Specified   Diet    Regular/General diet Is Patient on Accuchecks? No     Frequency: Effective Now      Number of Occurrences: Until Specified   Nursing    Change dressing to affected leg POD#3 or prior to Discharge     Frequency: Q Shift     Number of Occurrences: Until Specified    Early ambulation if no contraindications     Frequency: Continuous     Number of Occurrences: Until Specified    If patient on anticoagulants notify physician of:     Frequency: Until Discontinued     Number of Occurrences: Until Specified    Incentive spirometry     Frequency: 10X/PER HR AWAKE     Number of Occurrences: Until Specified    Initiate electrolyte protocol     Frequency: Until Discontinued     Number of Occurrences: Until Specified    Initiate Oxygen Protocol     Frequency: Until Discontinued     Number of Occurrences: Until Specified    Insert nazario catheter     Frequency: Once     Number of Occurrences: 1 Occurrences    Intake and Output     Frequency: Q Shift     Number of Occurrences: Until Specified    Intermittent Straight Cath Protocol for Acute Urinary Retention     Frequency: Until Discontinued     Number of Occurrences: Until Specified    Neurovascular checks     Frequency: Q4H     Number of Occurrences: Until Specified    Notify physician     Frequency: PRN     Number of Occurrences: Until Specified    Nurse Driven Indwelling Urinary Catheter Removal Protocol     Frequency: Until Discontinued     Number of Occurrences: Until Specified    Nurse Driven Indwelling Urinary Catheter Removal Protocol     Frequency: Until Discontinued     Number of Occurrences: Until Specified    Place sequential compression device     Frequency: Continuous     Number of Occurrences: Until Specified    Place sequential compression device     Frequency: Continuous     Number of Occurrences: Until Specified     Order Comments: Except while ambulating.      Place DAWOOD hose Knee high     Frequency: Continuous     Number of Occurrences: Until Specified    Pulse oximetry     Frequency: Spot Check     Number of Occurrences: Until Specified      Order Comments: With vital signs      Saline lock PIV when tolerating PO intake     Frequency: Once     Number of Occurrences: 1 Occurrences    Up to chair Once     Frequency: Once     Number of Occurrences: 1 Occurrences     Order Comments: Tonight, POD#0      Up with assistance PRN     Frequency: PRN     Number of Occurrences: Until Specified    Vital signs     Frequency: Per Unit Routine     Number of Occurrences: 2 Hours     Order Comments: ED holding order only  Cancel if other admission orders exist!        Vital signs     Frequency: Per Unit Routine     Number of Occurrences: Until Specified     Order Comments: Every 4 hours for 24 hours, then unit routine      Weight bearing as tolerated     Frequency: Until Discontinued     Number of Occurrences: Until Specified    Weight bearing as tolerated     Frequency: Until Discontinued     Number of Occurrences: Until Specified   Medications    acetaminophen (Tylenol) tab 650 mg     Frequency: Q6H PRN     Dose: 650 mg     Route: Oral    amphetamine-dextroamphetamine (Adderall) tab 5 mg     Frequency: TID     Dose: 5 mg     Route: Oral    apixaban (Eliquis) tab 2.5 mg     Frequency: BID@0600,1800     Dose: 2.5 mg     Route: Oral    bisacodyl (Dulcolax) 10 MG rectal suppository 10 mg     Frequency: Daily PRN     Dose: 10 mg     Route: Rectal    diphenhydrAMINE (Benadryl) 50 mg/mL  injection 12.5 mg     Linked Order: Or     Frequency: Q4H PRN     Dose: 12.5 mg     Route: Intravenous    diphenhydrAMINE (Benadryl) cap/tab 25 mg     Linked Order: Or     Frequency: Q4H PRN     Dose: 25 mg     Route: Oral    docusate sodium (Colace) cap 100 mg     Frequency: BID     Dose: 100 mg     Route: Oral    fleet enema (Fleet) rectal enema 133 mL     Frequency: Once PRN     Dose: 1 enema     Route: Rectal    HYDROmorphone (Dilaudid) 1 MG/ML injection 0.2 mg     Linked Order: Or     Frequency: Q2H PRN     Dose: 0.2 mg     Route: Intravenous    HYDROmorphone (Dilaudid) 1 MG/ML injection  0.4 mg     Linked Order: Or     Frequency: Q2H PRN     Dose: 0.4 mg     Route: Intravenous    HYDROmorphone (Dilaudid) 1 MG/ML injection 0.8 mg     Linked Order: Or     Frequency: Q2H PRN     Dose: 0.8 mg     Route: Intravenous    LORazepam (Ativan) tab 0.5 mg     Frequency: Q6H PRN     Dose: 0.5 mg     Route: Oral    magnesium hydroxide (Milk of Magnesia) 400 MG/5ML oral suspension 30 mL     Frequency: Daily PRN     Dose: 30 mL     Route: Oral    meclizine (Antivert) tab 12.5 mg     Frequency: TID PRN     Dose: 12.5 mg     Route: Oral    metoclopramide (Reglan) 5 mg/mL injection 10 mg     Frequency: Q8H PRN     Dose: 10 mg     Route: Intravenous    ondansetron (Zofran) 4 MG/2ML injection 4 mg     Frequency: Q6H PRN     Dose: 4 mg     Route: Intravenous    ondansetron (Zofran) 4 MG/2ML injection 4 mg     Frequency: Q6H PRN     Dose: 4 mg     Route: Intravenous    oxyCODONE immediate release tab 2.5 mg     Linked Order: Or     Frequency: Q4H PRN     Dose: 2.5 mg     Route: Oral    oxyCODONE immediate release tab 5 mg     Linked Order: Or     Frequency: Q4H PRN     Dose: 5 mg     Route: Oral    pantoprazole (Protonix) DR tab 40 mg     Frequency: QAM AC     Dose: 40 mg     Route: Oral    polyethylene glycol (PEG 3350) (Miralax) 17 g oral packet 17 g     Frequency: Daily PRN     Dose: 17 g     Route: Oral    sennosides (Senokot) tab 17.2 mg     Frequency: Nightly     Dose: 17.2 mg     Route: Oral    sennosides (Senokot) tab 8.6 mg     Frequency: Nightly     Dose: 8.6 mg     Route: Oral    sodium chloride 0.9 % IV bolus 500 mL     Frequency: Once PRN     Dose: 500 mL     Route: Intravenous     Tanmay Min MD  2/16/2025

## 2025-02-18 NOTE — OCCUPATIONAL THERAPY NOTE
OCCUPATIONAL THERAPY EVALUATION - INPATIENT     Room Number: 414/414-A  Evaluation Date: 2/18/2025  Type of Evaluation: Initial   Presenting Problem: fall at home; closed displaced fracture of right femoral neck; s/p R hip hemiarthroplasty (2/16/25)  Co-Morbidities: chronic pain syndrome, TIA    Physician Order: IP Consult to Occupational Therapy  Reason for Therapy: ADL/IADL Dysfunction and Discharge Planning    OCCUPATIONAL THERAPY ASSESSMENT   Patient is a 79 year old female admitted 2/15/2025 for fall at home; closed displaced fracture of right femoral neck; s/p R hip hemiarthroplasty (2/16/25).  Prior to admission, patient's baseline is independent with ADLS and mod I for functional mobility using RW.  Patient is currently functioning below baseline with toileting, bathing, lower body dressing, transfers, static standing balance, dynamic standing balance, and functional standing tolerance.  Patient is requiring  min-mod assist for functional transfers and up to max assist for ADLs  as a result of the following impairments: decreased functional strength, decreased endurance, pain, impaired standing balance, decreased muscular endurance, and medical status. Occupational Therapy will continue to follow for duration of hospitalization.    Patient will benefit from continued skilled OT Services to promote return to prior level of function and safety with continuous assistance and gradual rehabilitative therapy.    PLAN DURING HOSPITALIZATION  OT Device Recommendations: TBD  OT Treatment Plan: Balance activities;Energy conservation/work simplification techniques;ADL training;Functional transfer training;Endurance training;Patient/Family education;Patient/Family training;Compensatory technique education     OCCUPATIONAL THERAPY MEDICAL/SOCIAL HISTORY   Problem List   Principal Problem:    Closed displaced fracture of right femoral neck (HCC)    HOME SITUATION  Type of Home: House  Home Layout: One level; Able to live  on main level (ranch home)  Lives With: Spouse  Shower/Tub and Equipment: Tub-shower combo  Drives: No    Prior Level of [place]: Prior to admission pt was independent with all ADLs and mod I for functional mobility using RW. Pt reports living with her .     SUBJECTIVE  \"I don't think anyone has told me about my precautions.\"     OCCUPATIONAL THERAPY EXAMINATION   OBJECTIVE  Precautions: MARYLU - posterior; Limb alert - right  Fall Risk: Standard fall risk    WEIGHT BEARING RESTRICTION  R Lower Extremity: Weight Bearing as Tolerated    PAIN ASSESSMENT  Ratin  Location: RLE  Management Techniques: Activity promotion; Relaxation; Repositioning    COGNITION  Overall Cognitive Status:  WFL - within functional limits  Orientation Level:  oriented x4  Pt demonstrated impaired carry-over to education, requiring multiple repetitions of posterior hip precautions. Unable to recall any precautions at end of session.     RANGE OF MOTION   Upper extremity ROM is within functional limits     STRENGTH ASSESSMENT  Upper extremity strength is within functional limits     ACTIVITIES OF DAILY LIVING ASSESSMENT  AM-PAC ‘6-Clicks’ Inpatient Daily Activity Short Form  How much help from another person does the patient currently need…  -   Putting on and taking off regular lower body clothing?: A Lot  -   Bathing (including washing, rinsing, drying)?: A Lot  -   Toileting, which includes using toilet, bedpan or urinal? : A Lot  -   Putting on and taking off regular upper body clothing?: A Little  -   Taking care of personal grooming such as brushing teeth?: None  -   Eating meals?: None    AM-PAC Score:  Score: 17  Approx Degree of Impairment: 50.11%  Standardized Score (AM-PAC Scale): 37.26  CMS Modifier (G-Code): CK    FUNCTIONAL TRANSFER ASSESSMENT  Sit to Stand: Chair  Chair: Minimal Assist (mod assist with increased fatigue)  Comments: Pt completed 4 sit to stands from chair level in prep for functional transfers. Pt  initially required min assist to complete, but declined to mod assist for last 2 stands due to increased fatigue.     BALANCE ASSESSMENT  Static Sitting: Supervision  Static Standing: Contact Guard Assist  Dynamic sitting: SBA    FUNCTIONAL ADL ASSESSMENT  Grooming Seated: Independent  LB Dressing Seated: Maximum Assist  LB Dressing Standing: Maximum Assist    Skilled Therapy Provided:   RN cleared pt for participation. Pt received in chair with no visitors present. Pt agreeable to participation in therapy. Provided pt education on posterior hip precautions and activity modification / compensatory strategies for managing LE dressing and functional transfers. Pt tolerated treatment fairly well and completed all functional tasks with assist levels listed above. Pt demo decreased carry-over to ed, unable to recall any posterior hip precautions despite multiple attempts at ed. Pt given posterior hip precautions handout to refer to. Pt required max assist to thread BLE through brief and pull brief up over hips in standing with RW support. Pt remained in recliner with alarm on and all needs in reach. RN aware.         EDUCATION PROVIDED  Patient Education : Role of Occupational Therapy; Plan of Care; Functional Transfer Techniques; Fall Prevention; Weight Bear Status; Surgical Precautions; Posture/Positioning; Proper Body Mechanics  Patient's Response to Education: Verbalized Understanding; Returned Demonstration    The patient's Approx Degree of Impairment: 50.11% has been calculated based on documentation in the [facility] '6 clicks' Inpatient Daily Activity Short Form.  Research supports that patients with this level of impairment may benefit from rehab.  Final disposition will be made by interdisciplinary medical team.    Patient End of Session: Up in chair;Needs met;Call light within reach;RN aware of session/findings;Hospital anti-slip socks;All patient questions and concerns addressed;Alarm set    OT Goals  Patient  self-stated goal is: none stated      Patient will complete LE dressing with SBA  Comment:     Patient will complete toilet transfer with SBA  Comment:     Patient will complete self care task at sink level with SBA   Comment:    Patient will independently recall posterior hip precautions  Comment:         Goals  on: 25  Frequency: 3x/week    Patient Evaluation Complexity Level:   Occupational Profile/Medical History MODERATE - Expanded review of history including review of medical or therapy record   Specific performance deficits impacting engagement in ADL/IADL MODERATE  3 - 5 performance deficits   Client Assessment/Performance Deficits HIGH - Comorbidities and significant modifications of tasks    Clinical Decision Making MODERATE - Analysis of occupational profile, detailed assessments, several treatment options    Overall Complexity MODERATE     OT Session Time: 25 minutes  Self-Care Home Management: 25 minutes

## 2025-02-18 NOTE — CM/SW NOTE
JUANA followed up on DC planning.     JUANA verified with Ilana that [facility] is able to accept    RN updated pt is not ready for DC yet - waiting for script from MD ARIAS updated pt's spouse and notified him of above. Spouse happy - is hoping [facility] will have a bed for pt tomorrow     JUANA reserved [facility] and notified Ilana who confirms bed should be available for the pt tomorrow     [facility] on WILL CALL for the pt, once cleared in the AM can be arranged with [facility] [phone]    PCS form completed.     Will need to update spouse on the discharge per his request    PLAN: DC to [facility] via [facility] - placed on WILL CALL    Connie EAGLE LSW, MSW ext. [phone]

## 2025-02-18 NOTE — PHYSICAL THERAPY NOTE
PHYSICAL THERAPY TREATMENT NOTE - INPATIENT     Room Number: 414/414-A       Presenting Problem: R hip hemiathroplasty  Co-Morbidities : TIA, Dyspnea, ARF, Hypoxia    Problem List  Principal Problem:    Closed displaced fracture of right femoral neck (HCC)      PHYSICAL THERAPY ASSESSMENT   Patient demonstrates good  progress this session, goals  updated to reflect patient performance.      Patient is requiring moderate assist as a result of the following impairments: decreased functional strength, decreased endurance/aerobic capacity, pain, impaired standing balance, impaired coordination, impaired motor planning, decreased muscular endurance, and medical status.     Patient continues to function below baseline with bed mobility, transfers, gait, maintaining seated position, standing prolonged periods, and performing household tasks.  Next session anticipate patient to progress bed mobility, transfers, gait, maintaining seated position, standing prolonged periods, and performing household tasks.  Physical Therapy will continue to follow patient for duration of hospitalization.    Patient continues to benefit from continued skilled PT services: to promote return to prior level of function and safety with continuous assistance and gradual rehabilitative therapy .    PLAN DURING HOSPITALIZATION  Nursing Mobility Recommendation : 1 Assist  PT Device Recommendation: Rolling walker  PT Treatment Plan: Bed mobility;Body mechanics;Endurance;Energy conservation;Patient education;Gait training;Range of motion;Strengthening;Transfer training;Balance training  Frequency (Obs): Daily     SUBJECTIVE  I feel ok the pain is better and I mostly feel weak so I am going to rehab to get my strength back prior to returning home.     OBJECTIVE  Precautions: MARYLU - posterior;Limb alert - right    WEIGHT BEARING RESTRICTION  R Lower Extremity: Weight Bearing as Tolerated      PAIN ASSESSMENT   Ratin  Location: RLE  Management  Techniques: Activity promotion;Body mechanics;Breathing techniques;Relaxation;Repositioning    BALANCE  Static Sitting: Fair +  Dynamic Sitting: Fair  Static Standing: Poor +  Dynamic Standing: Poor +    ACTIVITY TOLERANCE                          O2 WALK  Oxygen Therapy  SPO2% Ambulation on Oxygen: 97    AM-PAC '6-Clicks' INPATIENT SHORT FORM - BASIC MOBILITY  How much difficulty does the patient currently have...  Patient Difficulty: Turning over in bed (including adjusting bedclothes, sheets and blankets)?: A Lot   Patient Difficulty: Sitting down on and standing up from a chair with arms (e.g., wheelchair, bedside commode, etc.): A Lot   Patient Difficulty: Moving from lying on back to sitting on the side of the bed?: A Lot   How much help from another person does the patient currently need...   Help from Another: Moving to and from a bed to a chair (including a wheelchair)?: A Lot   Help from Another: Need to walk in hospital room?: A Lot   Help from Another: Climbing 3-5 steps with a railing?: Total     AM-PAC Score:  Raw Score: 11   Approx Degree of Impairment: 72.57%   Standardized Score (AM-PAC Scale): 33.86   CMS Modifier (G-Code): CL    FUNCTIONAL ABILITY STATUS  Functional Mobility/Gait Assessment  Gait Assistance: Moderate assistance  Distance (ft): 5  Assistive Device: Rolling walker  Pattern: Shuffle;R Decreased stance time;R Foot flat (decreased step length limited RLE step length swing phase)  Rolling: moderate assist  Supine to Sit: moderate assist  Sit to Supine: moderate assist  Sit to Stand: moderate assist    Skilled Therapy Provided: Pt ed with bed mobility and transfers with mod a with RW. Pt ed with mod a to amb 5 steps with mod a for balance and weight shifting assist with retropulsive balance demonstrated. Pt ed with therex in chair x 10 reps as per HEP for LE strength and ROM as tolerated. Pt is on track for GR with PT, OT as medical progress allows.     The patient's Approx Degree of  Impairment: 72.57% has been calculated based on documentation in the [facility] '6 clicks' Inpatient Daily Activity Short Form.  Research supports that patients with this level of impairment may benefit from GR with PT, OT.    Final disposition will be made by interdisciplinary medical team.    THERAPEUTIC EXERCISES  Lower Extremity Ankle pumps  Heel slides  LAQ     Position Sitting & Standing       Patient End of Session: Up in chair;With  staff;Needs met;Call light within reach;RN aware of session/findings;All patient questions and concerns addressed;Alarm set    CURRENT GOALS   Goals to be met by: 2/30/2025  Patient Goal Patient's self-stated goal is: Return home    Goal #1 Patient is able to demonstrate supine - sit EOB @ level: modified independent   Goal #1   Current Status  Mod A    Goal #2 Patient is able to demonstrate transfers Sit to/from Stand at assistance level: modified independent      Goal #2  Current Status  Mod A with RW   Goal #3 Patient is able to ambulate 300 feet with assistive device at assistance level: modified independent    Goal #3   Current Status  Mod A with RW 5' unsteady gait   Goal #4 Patient will negotiate 2 stairs/one curb w/ assistive device and supervision   Goal #4   Current Status  NT ongoing   Goal #5 Patient verbalizes and/or demonstrates all precautions and safety concerns independently   Goal #5   Current Status  Ongoing   Goal #6 Patient independently performs home exercise program for ROM/strengthening per the instructions provided in preparation for discharge.   Goal #6  Current Status  Ongoing     Gait Training: 15 minutes  Therapeutic Exercise: 10 minutes

## 2025-02-19 VITALS
SYSTOLIC BLOOD PRESSURE: 161 MMHG | BODY MASS INDEX: 20 KG/M2 | WEIGHT: 116.63 LBS | DIASTOLIC BLOOD PRESSURE: 73 MMHG | OXYGEN SATURATION: 94 % | TEMPERATURE: 99 F | HEART RATE: 73 BPM | RESPIRATION RATE: 18 BRPM

## 2025-02-19 LAB
ANION GAP SERPL CALC-SCNC: 8 MMOL/L (ref 0–18)
BASOPHILS # BLD AUTO: 0.06 X10(3) UL (ref 0–0.2)
BASOPHILS NFR BLD AUTO: 0.5 %
BUN BLD-MCNC: 13 MG/DL (ref 9–23)
BUN/CREAT SERPL: 26.5 (ref 10–20)
CALCIUM BLD-MCNC: 8.6 MG/DL (ref 8.7–10.4)
CHLORIDE SERPL-SCNC: 103 MMOL/L (ref 98–112)
CO2 SERPL-SCNC: 27 MMOL/L (ref 21–32)
CREAT BLD-MCNC: 0.49 MG/DL
DEPRECATED RDW RBC AUTO: 43.4 FL (ref 35.1–46.3)
EGFRCR SERPLBLD CKD-EPI 2021: 95 ML/MIN/1.73M2 (ref 60–?)
EOSINOPHIL # BLD AUTO: 0.23 X10(3) UL (ref 0–0.7)
EOSINOPHIL NFR BLD AUTO: 2 %
ERYTHROCYTE [DISTWIDTH] IN BLOOD BY AUTOMATED COUNT: 12.1 % (ref 11–15)
GLUCOSE BLD-MCNC: 100 MG/DL (ref 70–99)
HCT VFR BLD AUTO: 29.5 %
HGB BLD-MCNC: 10.1 G/DL
IMM GRANULOCYTES # BLD AUTO: 0.05 X10(3) UL (ref 0–1)
IMM GRANULOCYTES NFR BLD: 0.4 %
LYMPHOCYTES # BLD AUTO: 2.56 X10(3) UL (ref 1–4)
LYMPHOCYTES NFR BLD AUTO: 21.9 %
MCH RBC QN AUTO: 33.3 PG (ref 26–34)
MCHC RBC AUTO-ENTMCNC: 34.2 G/DL (ref 31–37)
MCV RBC AUTO: 97.4 FL
MONOCYTES # BLD AUTO: 1 X10(3) UL (ref 0.1–1)
MONOCYTES NFR BLD AUTO: 8.6 %
NEUTROPHILS # BLD AUTO: 7.79 X10 (3) UL (ref 1.5–7.7)
NEUTROPHILS # BLD AUTO: 7.79 X10(3) UL (ref 1.5–7.7)
NEUTROPHILS NFR BLD AUTO: 66.6 %
OSMOLALITY SERPL CALC.SUM OF ELEC: 286 MOSM/KG (ref 275–295)
PLATELET # BLD AUTO: 287 10(3)UL (ref 150–450)
POTASSIUM SERPL-SCNC: 3.4 MMOL/L (ref 3.5–5.1)
RBC # BLD AUTO: 3.03 X10(6)UL
SARS-COV-2 RNA RESP QL NAA+PROBE: NOT DETECTED
SODIUM SERPL-SCNC: 138 MMOL/L (ref 136–145)
WBC # BLD AUTO: 11.7 X10(3) UL (ref 4–11)

## 2025-02-19 PROCEDURE — 85025 COMPLETE CBC W/AUTO DIFF WBC: CPT | Performed by: INTERNAL MEDICINE

## 2025-02-19 PROCEDURE — 80048 BASIC METABOLIC PNL TOTAL CA: CPT | Performed by: INTERNAL MEDICINE

## 2025-02-19 RX ORDER — POTASSIUM CHLORIDE 1500 MG/1
40 TABLET, EXTENDED RELEASE ORAL ONCE
Status: COMPLETED | OUTPATIENT
Start: 2025-02-19 | End: 2025-02-19

## 2025-02-19 RX ORDER — POLYETHYLENE GLYCOL 3350 17 G/17G
17 POWDER, FOR SOLUTION ORAL DAILY PRN
Qty: 10 EACH | Refills: 0 | Status: SHIPPED | COMMUNITY
Start: 2025-02-19

## 2025-02-19 RX ORDER — PANTOPRAZOLE SODIUM 40 MG/1
40 TABLET, DELAYED RELEASE ORAL
Qty: 30 TABLET | Refills: 0 | Status: SHIPPED | OUTPATIENT
Start: 2025-02-20

## 2025-02-19 RX ORDER — DIAZEPAM 5 MG/1
5 TABLET ORAL EVERY 6 HOURS PRN
Qty: 12 TABLET | Refills: 0 | Status: SHIPPED | OUTPATIENT
Start: 2025-02-19

## 2025-02-19 RX ORDER — DEXTROAMPHETAMINE SULFATE 5 MG/1
5 TABLET ORAL 3 TIMES DAILY
Qty: 30 TABLET | Refills: 0 | Status: SHIPPED | OUTPATIENT
Start: 2025-02-19

## 2025-02-19 RX ORDER — PSEUDOEPHEDRINE HCL 30 MG
100 TABLET ORAL 2 TIMES DAILY
Qty: 60 CAPSULE | Refills: 0 | Status: SHIPPED | COMMUNITY
Start: 2025-02-19

## 2025-02-19 NOTE — PLAN OF CARE
Patient voiding freely via rolling chair over toilet, cannot use purewick. Cleared for discharge to [facility] tomorrow. Needs script for anxiety medication from MD.     Problem: Patient Centered Care  Goal: Patient preferences are identified and integrated in the patient's plan of care  Description: Interventions:  - Provide timely, complete, and accurate information to patient/family  - Incorporate patient and family knowledge, values, beliefs, and cultural backgrounds into the planning and delivery of care  - Encourage patient/family to participate in care and decision-making at the level they choose  - Honor patient and family perspectives and choices  Outcome: Progressing       Problem: PAIN - ADULT  Goal: Verbalizes/displays adequate comfort level or patient's stated pain goal  Description: INTERVENTIONS:  - Encourage pt to monitor pain and request assistance  - Assess pain using appropriate pain scale  - Administer analgesics based on type and severity of pain and evaluate response  - Implement non-pharmacological measures as appropriate and evaluate response  - Consider cultural and social influences on pain and pain management  - Manage/alleviate anxiety  - Utilize distraction and/or relaxation techniques  - Monitor for opioid side effects  - Notify MD/LIP if interventions unsuccessful or patient reports new pain  - Anticipate increased pain with activity and pre-medicate as appropriate  Outcome: Progressing     Problem: SAFETY ADULT - FALL  Goal: Free from fall injury  Description: INTERVENTIONS:  - Assess pt frequently for physical needs  - Identify cognitive and physical deficits and behaviors that affect risk of falls.  - [place] fall precautions as indicated by assessment.  - Educate pt/family on patient safety including physical limitations  - Instruct pt to call for assistance with activity based on assessment  - Modify environment to reduce risk of injury  - Provide assistive devices as  appropriate  - Consider OT/PT consult to assist with strengthening/mobility  - Encourage toileting schedule  Outcome: Progressing     Problem: DISCHARGE PLANNING  Goal: Discharge to home or other facility with appropriate resources  Description: INTERVENTIONS:  - Identify barriers to discharge w/pt and caregiver  - Include patient/family/discharge partner in discharge planning  - Arrange for needed discharge resources and transportation as appropriate  - Identify discharge learning needs (meds, wound care, etc)  - Arrange for interpreters to assist at discharge as needed  - Consider post-discharge preferences of patient/family/discharge partner  - Complete POLST form as appropriate  - Assess patient's ability to be responsible for managing their own health  - Refer to Case Management Department for coordinating discharge planning if the patient needs post-hospital services based on physician/LIP order or complex needs related to functional status, cognitive ability or social support system  Outcome: Progressing     Problem: MUSCULOSKELETAL - ADULT  Goal: Return mobility to safest level of function  Description: INTERVENTIONS:  - Assess patient stability and activity tolerance for standing, transferring and ambulating w/ or w/o assistive devices  - Assist with transfers and ambulation using safe patient handling equipment as needed  - Ensure adequate protection for wounds/incisions during mobilization  - Obtain PT/OT consults as needed  - Advance activity as appropriate  - Communicate ordered activity level and limitations with patient/family  Outcome: Progressing     Problem: Impaired Functional Mobility  Goal: Achieve highest/safest level of mobility/gait  Description: Interventions:  - Assess patient's functional ability and stability  - Promote increasing activity/tolerance for mobility and gait  - Educate and engage patient/family in tolerated activity level and precautions  Outcome: Progressing     Problem:  GENITOURINARY - ADULT  Goal: Absence of urinary retention  Description: INTERVENTIONS:  - Assess patient’s ability to void and empty bladder  - Monitor intake/output and perform bladder scan as needed  - Follow urinary retention protocol/standard of care  - Consider collaborating with pharmacy to review patient's medication profile  - Implement strategies to promote bladder emptying  Outcome: Progressing

## 2025-02-19 NOTE — DISCHARGE SUMMARY
[facility]  part of [facility]    Discharge Summary    Narciso Burnett Patient Status:  Inpatient    1945 MRN [id number]   Location [facility] 4W/SW/SE Attending Tanmay Min MD   Hosp Day # 4 PAULA BEAULIEUANGELICA JUAREZDOROTHY                Date of Admission: 2/15/2025   Date of Discharge: 2024    Admitting Diagnosis: Closed displaced fracture of right femoral neck (HCC) [S72.001A]    Disposition: Transfer to Skilled Nursing Facility:      Discharge Diagnosis: .Principal Problem:    Closed displaced fracture of right femoral neck (HCC)      Hospital Course:   Reason for Admission: Acute femoral neck fracture    Discharge Physical Exam:  Vital Signs:  Blood pressure (!) 161/73, pulse 73, temperature 99 °F (37.2 °C), temperature source Oral, resp. rate 18, weight 116 lb 9.6 oz (52.9 kg), SpO2 94%, not currently breastfeeding.     General: No acute distress. Alert and oriented x 3.  HEENT: Moist mucous membranes. EOM-I. PERRL  Neck: No lymphadenopathy.  No JVD. No carotid bruits.  Respiratory: Clear to auscultation bilaterally.  No wheezes. No rhonchi.  Cardiovascular: S1, S2.  Regular rate and rhythm.  No murmurs. Equal pulses   Abdomen: Soft, nontender, nondistended.  Positive bowel sounds. No rebound tenderness  Neurologic: No focal neurological deficits.  Musculoskeletal: Full range of motion of all extremities.  No swelling noted.  Integument: No lesions. No erythema.  Psychiatric: Appropriate mood and affect.    Hospital Course: Narciso Burnett is a(n) 79 year old female. Chronic pain syndrome, TIA, in the past, cervical spinal stenosis status post cervical fusion, who came into the emergency room with complaining of pain in the right hip after she fell out of a rolling chair.  Patient denies any chest pain.  No shortness a breath Lozol no loss of consciousness.    Patient is not on any anticoagulants.    Patient is found to have a right hip fracture and being admitted to the  hospital.    The patient denies any history of coronary artery disease.  Denies any history of congestive heart failure.      Patient was admitted to the hospital.  Patient was seen by orthopedic she underwent surgery overall did well today the patient is doing fine today decided to discharge her to the skilled nursing facility.  Please refer to my progress note as well as other progress notes for all the details.           Complications:    Consultants         Provider   Role Specialty     Tyshawn Lazaro MD      Consulting Physician SURGERY, ORTHOPEDIC          Surgical Procedures       Case IDs Date Procedure Surgeon Location Status    [id number] 2/16/25 RIGHT HIP HEMIARTHROPLASTY Tyshawn Lazaro MD [facility] MAIN OR Comp              Discharge Plan:   Discharge Condition: Good    Current Discharge Medication List        New Orders    Details   docusate sodium 100 MG Oral Cap Take 100 mg by mouth 2 (two) times daily.      pantoprazole 40 MG Oral Tab EC Take 1 tablet (40 mg total) by mouth every morning before breakfast.      polyethylene glycol, PEG 3350, 17 g Oral Powd Pack Take 17 g by mouth daily as needed.      apixaban 2.5 MG Oral Tab Take 1 tablet (2.5 mg total) by mouth 2 (two) times daily.      oxyCODONE 5 MG Oral Tab Take 1 tablet (5 mg total) by mouth every 6 (six) hours as needed.      Naloxone HCl 4 MG/0.1ML Nasal Liquid 4 mg by Nasal route as needed. If patient remains unresponsive, repeat dose in other nostril 2-5 minutes after first dose.           Home Meds - Modified    Details   Dextroamphetamine Sulfate 5 MG Oral Tab Take 1 tablet (5 mg total) by mouth 3 (three) times daily.      diazePAM 5 MG Oral Tab Take 1 tablet (5 mg total) by mouth every 6 (six) hours as needed for Anxiety.           Home Meds - Unchanged    Details   Senna 8.6 MG Oral Tab Take 1 tablet (8.6 mg total) by mouth nightly.      Meclizine HCl 25 MG Oral Tab Take 1 tablet (25 mg total) by mouth 3 (three) times daily as needed.       ondansetron 4 MG Oral Tablet Dispersible Take 4 mg by mouth daily.      Cholecalciferol (VITAMIN D3) 1.25 MG (60849 UT) Oral Cap Take 1 tablet by mouth once a week.                 Discharge Diet: Cardiac diet      Discharge Activity: As tolerated    Follow up:      Follow-up Information       Tyshawn Lazaro MD. Schedule an appointment as soon as possible for a visit in 1 week(s).    Specialty: SURGERY, ORTHOPEDIC  Why: Surgery follow up  Contact information:  [address]  [place] [address] [phone]               Arely Cote. Schedule an appointment as soon as possible for a visit.    Specialty: Internal Medicine  Why: As needed  Contact information:  [address] [id number] [phone]                             Follow up Labs:          Other Discharge Instructions:         Weight bearing as tolerated right lower extremity  Posterior hip precautions      Tanmay Min MD   2/19/2025

## 2025-02-19 NOTE — CM/SW NOTE
Inquiry from RN-Dr. Min will be here later this afternoon to do her med rec/scripts will [facility] have a bed today?    Per PP We can take her today! We're ready as early as 12pm, but after is fine. RAPID COVID test please.    Plan  [facility] order   Room 204-2,  [facility] on will call due to poor sitting/standing balance  Pcs done  RN report: [phone]     RN to schedule transport time and notify pt/family once known.    / to remain available for support and/or discharge planning.     Yolanda Quach, ELOINA    Ext [phone]

## 2025-02-19 NOTE — PLAN OF CARE
Post-op day 3. Dressing in place to R hip. No acute changes overnight. 2L oxygen via nasal cannula. Pain medication given as needed. Voiding freely. Safety precautions maintained, bed locked and in lowest position, call light in reach. Frequent rounding by nursing staff. Plan for [facility] pending medical clearance.    Problem: Patient Centered Care  Goal: Patient preferences are identified and integrated in the patient's plan of care  Description: Interventions:  - What would you like us to know as we care for you?   - Provide timely, complete, and accurate information to patient/family  - Incorporate patient and family knowledge, values, beliefs, and cultural backgrounds into the planning and delivery of care  - Encourage patient/family to participate in care and decision-making at the level they choose  - Honor patient and family perspectives and choices  Outcome: Progressing    Problem: PAIN - ADULT  Goal: Verbalizes/displays adequate comfort level or patient's stated pain goal  Description: INTERVENTIONS:  - Encourage pt to monitor pain and request assistance  - Assess pain using appropriate pain scale  - Administer analgesics based on type and severity of pain and evaluate response  - Implement non-pharmacological measures as appropriate and evaluate response  - Consider cultural and social influences on pain and pain management  - Manage/alleviate anxiety  - Utilize distraction and/or relaxation techniques  - Monitor for opioid side effects  - Notify MD/LIP if interventions unsuccessful or patient reports new pain  - Anticipate increased pain with activity and pre-medicate as appropriate  Outcome: Progressing     Problem: SAFETY ADULT - FALL  Goal: Free from fall injury  Description: INTERVENTIONS:  - Assess pt frequently for physical needs  - Identify cognitive and physical deficits and behaviors that affect risk of falls.  - [place] fall precautions as indicated by assessment.  - Educate pt/family on  patient safety including physical limitations  - Instruct pt to call for assistance with activity based on assessment  - Modify environment to reduce risk of injury  - Provide assistive devices as appropriate  - Consider OT/PT consult to assist with strengthening/mobility  - Encourage toileting schedule  Outcome: Progressing     Problem: DISCHARGE PLANNING  Goal: Discharge to home or other facility with appropriate resources  Description: INTERVENTIONS:  - Identify barriers to discharge w/pt and caregiver  - Include patient/family/discharge partner in discharge planning  - Arrange for needed discharge resources and transportation as appropriate  - Identify discharge learning needs (meds, wound care, etc)  - Arrange for interpreters to assist at discharge as needed  - Consider post-discharge preferences of patient/family/discharge partner  - Complete POLST form as appropriate  - Assess patient's ability to be responsible for managing their own health  - Refer to Case Management Department for coordinating discharge planning if the patient needs post-hospital services based on physician/LIP order or complex needs related to functional status, cognitive ability or social support system  Outcome: Progressing     Problem: MUSCULOSKELETAL - ADULT  Goal: Return mobility to safest level of function  Description: INTERVENTIONS:  - Assess patient stability and activity tolerance for standing, transferring and ambulating w/ or w/o assistive devices  - Assist with transfers and ambulation using safe patient handling equipment as needed  - Ensure adequate protection for wounds/incisions during mobilization  - Obtain PT/OT consults as needed  - Advance activity as appropriate  - Communicate ordered activity level and limitations with patient/family  Outcome: Not Progressing     Problem: Impaired Functional Mobility  Goal: Achieve highest/safest level of mobility/gait  Description: Interventions:  - Assess patient's functional  ability and stability  - Promote increasing activity/tolerance for mobility and gait  - Educate and engage patient/family in tolerated activity level and precautions  Outcome: Not Progressing     Problem: GENITOURINARY - ADULT  Goal: Absence of urinary retention  Description: INTERVENTIONS:  - Assess patient’s ability to void and empty bladder  - Monitor intake/output and perform bladder scan as needed  - Follow urinary retention protocol/standard of care  - Consider collaborating with pharmacy to review patient's medication profile  - Implement strategies to promote bladder emptying  Outcome: Progressing

## 2025-02-19 NOTE — PLAN OF CARE
Patient cleared for discharge. After visit summary reviewed with patient. Report called into [facility] at [facility]. Personal belongings sent with patient, transported via [facility] [employer].    Problem: Patient Centered Care  Goal: Patient preferences are identified and integrated in the patient's plan of care  Description: Interventions:  - What would you like us to know as we care for you? My right shoulder has been hurting for a month  - Provide timely, complete, and accurate information to patient/family  - Incorporate patient and family knowledge, values, beliefs, and cultural backgrounds into the planning and delivery of care  - Encourage patient/family to participate in care and decision-making at the level they choose  - Honor patient and family perspectives and choices  Outcome: Adequate for Discharge     Problem: Patient/Family Goals  Goal: Patient/Family Long Term Goal  Description: Patient's Long Term Goal: go to rehab    Interventions:  - surgery  -medications  - See additional Care Plan goals for specific interventions  Outcome: Adequate for Discharge  Goal: Patient/Family Short Term Goal  Description: Patient's Short Term Goal: pain management    Interventions:   - medications  -ice  -rest  - See additional Care Plan goals for specific interventions  Outcome: Adequate for Discharge     Problem: PAIN - ADULT  Goal: Verbalizes/displays adequate comfort level or patient's stated pain goal  Description: INTERVENTIONS:  - Encourage pt to monitor pain and request assistance  - Assess pain using appropriate pain scale  - Administer analgesics based on type and severity of pain and evaluate response  - Implement non-pharmacological measures as appropriate and evaluate response  - Consider cultural and social influences on pain and pain management  - Manage/alleviate anxiety  - Utilize distraction and/or relaxation techniques  - Monitor for opioid side effects  - Notify MD/LIP if interventions unsuccessful or  patient reports new pain  - Anticipate increased pain with activity and pre-medicate as appropriate  Outcome: Adequate for Discharge     Problem: SAFETY ADULT - FALL  Goal: Free from fall injury  Description: INTERVENTIONS:  - Assess pt frequently for physical needs  - Identify cognitive and physical deficits and behaviors that affect risk of falls.  - [place] fall precautions as indicated by assessment.  - Educate pt/family on patient safety including physical limitations  - Instruct pt to call for assistance with activity based on assessment  - Modify environment to reduce risk of injury  - Provide assistive devices as appropriate  - Consider OT/PT consult to assist with strengthening/mobility  - Encourage toileting schedule  Outcome: Adequate for Discharge     Problem: DISCHARGE PLANNING  Goal: Discharge to home or other facility with appropriate resources  Description: INTERVENTIONS:  - Identify barriers to discharge w/pt and caregiver  - Include patient/family/discharge partner in discharge planning  - Arrange for needed discharge resources and transportation as appropriate  - Identify discharge learning needs (meds, wound care, etc)  - Arrange for interpreters to assist at discharge as needed  - Consider post-discharge preferences of patient/family/discharge partner  - Complete POLST form as appropriate  - Assess patient's ability to be responsible for managing their own health  - Refer to Case Management Department for coordinating discharge planning if the patient needs post-hospital services based on physician/LIP order or complex needs related to functional status, cognitive ability or social support system  Outcome: Adequate for Discharge     Problem: MUSCULOSKELETAL - ADULT  Goal: Return mobility to safest level of function  Description: INTERVENTIONS:  - Assess patient stability and activity tolerance for standing, transferring and ambulating w/ or w/o assistive devices  - Assist with transfers and  ambulation using safe patient handling equipment as needed  - Ensure adequate protection for wounds/incisions during mobilization  - Obtain PT/OT consults as needed  - Advance activity as appropriate  - Communicate ordered activity level and limitations with patient/family  Outcome: Adequate for Discharge     Problem: Impaired Functional Mobility  Goal: Achieve highest/safest level of mobility/gait  Description: Interventions:  - Assess patient's functional ability and stability  - Promote increasing activity/tolerance for mobility and gait  - Educate and engage patient/family in tolerated activity level and precautions  - Recommend use of  RW for transfers and ambulation  Outcome: Adequate for Discharge     Problem: GENITOURINARY - ADULT  Goal: Absence of urinary retention  Description: INTERVENTIONS:  - Assess patient’s ability to void and empty bladder  - Monitor intake/output and perform bladder scan as needed  - Follow urinary retention protocol/standard of care  - Consider collaborating with pharmacy to review patient's medication profile  - Implement strategies to promote bladder emptying  Outcome: Adequate for Discharge

## 2025-02-19 NOTE — PROGRESS NOTES
[facility]  part of [facility]    Progress Note    Narciso Burnett Patient Status:  Inpatient    1945 MRN [id number]   Location [facility] PRE OP RECOVERY Attending Tanmay Min MD   Hosp Day # 4 PCP NILO STEEN       SUBJECTIVE  Feeling fine   Pain is controlled.  Nursing staff reports no complaints.  Patient wants to go to subacute rehab      OBJECTIVE:  Vital signs in last 24 hours:  BP (!) 161/73 (BP Location: Right arm)   Pulse 73   Temp 99 °F (37.2 °C) (Oral)   Resp 18   Wt 116 lb 9.6 oz (52.9 kg)   SpO2 94%   BMI 20.01 kg/m²     Intake/Output:    Intake/Output Summary (Last 24 hours) at 2025 1028  Last data filed at 2025 1028  Gross per 24 hour   Intake 175 ml   Output 600 ml   Net -425 ml       Wt Readings from Last 3 Encounters:   02/15/25 116 lb 9.6 oz (52.9 kg)   22 104 lb (47.2 kg)   20 106 lb (48.1 kg)       Exam  Patient is lying comfortably in bed  Gen: No acute distress, alert  HEENT: No pallor no icterus  Pulm: Lungs clear, normal respiratory effort  CV: Heart with regular rate and rhythm, no peripheral edema  Abd: Abdomen soft, nontender, nondistended, no organomegaly, bowel sounds present  MSK: Full range of motion in extremities, no clubbing, no cyanosis  Skin: no rashes or lesions  CNS: Alert    Data Review:     Labs:   Lab Results   Component Value Date    WBC 11.7 2025    HGB 10.1 2025    HCT 29.5 2025    .0 2025    CREATSERUM 0.49 2025    BUN 13 2025     2025    K 3.4 2025     2025    CO2 27.0 2025     2025    CA 8.6 2025       LABS  Recent Labs   Lab 02/15/25  0757 25  0518 25  0436 25  0726 25  0454   RBC 4.08 3.16*  --  2.78* 3.03*   HGB 13.0 10.1*  --  9.3* 10.1*   HCT 39.4 31.2*  --  27.3* 29.5*   MCV 96.6 98.7  --  98.2 97.4   MCH 31.9 32.0  --  33.5 33.3   MCHC 33.0 32.4  --  34.1  34.2   RDW 12.0 11.9  --  12.0 12.1   NEPRELIM 7.45 14.71*  --  9.04* 7.79*   WBC 11.0 17.9*  --  12.4* 11.7*   .0 260.0  --  238.0 287.0   AST 21  --   --   --   --    ALT 11  --   --   --   --    * 101* 96  --  100*       Imaging:      Meds:   Current Facility-Administered Medications   Medication Dose Route Frequency    sennosides (Senokot) tab 17.2 mg  17.2 mg Oral Nightly    docusate sodium (Colace) cap 100 mg  100 mg Oral BID    polyethylene glycol (PEG 3350) (Miralax) 17 g oral packet 17 g  17 g Oral Daily PRN    magnesium hydroxide (Milk of Magnesia) 400 MG/5ML oral suspension 30 mL  30 mL Oral Daily PRN    bisacodyl (Dulcolax) 10 MG rectal suppository 10 mg  10 mg Rectal Daily PRN    fleet enema (Fleet) rectal enema 133 mL  1 enema Rectal Once PRN    ondansetron (Zofran) 4 MG/2ML injection 4 mg  4 mg Intravenous Q6H PRN    metoclopramide (Reglan) 5 mg/mL injection 10 mg  10 mg Intravenous Q8H PRN    diphenhydrAMINE (Benadryl) cap/tab 25 mg  25 mg Oral Q4H PRN    Or    diphenhydrAMINE (Benadryl) 50 mg/mL  injection 12.5 mg  12.5 mg Intravenous Q4H PRN    apixaban (Eliquis) tab 2.5 mg  2.5 mg Oral BID@0600,1800    oxyCODONE immediate release tab 2.5 mg  2.5 mg Oral Q4H PRN    Or    oxyCODONE immediate release tab 5 mg  5 mg Oral Q4H PRN    HYDROmorphone (Dilaudid) 1 MG/ML injection 0.2 mg  0.2 mg Intravenous Q2H PRN    Or    HYDROmorphone (Dilaudid) 1 MG/ML injection 0.4 mg  0.4 mg Intravenous Q2H PRN    Or    HYDROmorphone (Dilaudid) 1 MG/ML injection 0.8 mg  0.8 mg Intravenous Q2H PRN    LORazepam (Ativan) tab 0.5 mg  0.5 mg Oral Q6H PRN    ondansetron (Zofran) 4 MG/2ML injection 4 mg  4 mg Intravenous Q6H PRN    pantoprazole (Protonix) DR tab 40 mg  40 mg Oral QAM AC    meclizine (Antivert) tab 12.5 mg  12.5 mg Oral TID PRN    amphetamine-dextroamphetamine (Adderall) tab 5 mg  5 mg Oral TID    acetaminophen (Tylenol) tab 650 mg  650 mg Oral Q6H PRN       Assessment  Patient Active Problem  List   Diagnosis    Acute bronchitis with COPD (HCC)    Acute respiratory failure with hypoxia (HCC)    Hypoxemia    Weakness generalized    Dysphagia, unspecified type    Weight loss    Hyponatremia    TIA involving carotid artery    Attention deficit disorder (ADD) without hyperactivity    Moderate protein-calorie malnutrition (HCC)    Closed displaced fracture of right femoral neck (HCC)     Closed displaced fracture of right femoral neck (HCC)  Status post surgery.    Leukocytosis.,  Resolved  Could be from the surgery.  Patient is afebrile.    Much improved today.            Cervical spinal stenosis.     Will continue the patient on   Current pain regimen.        Will continue physical therapy and Occupational Therapy.     Discussed with the nursing staff.    Discharge planning to subacute rehab once accepted             Active Orders   Transfer     Bed request Once       Frequency: Once       Number of Occurrences: 1 Occurrences   Consult     ED Consult to Orthopedic Surgery       Frequency: Once             Plan:     Continue current treatment.  Discussed with the nursing staff.       Active Orders   LAB    Potassium     Frequency: Tomorrow AM draw     Number of Occurrences: 1 Occurrences     Order Comments: DO NOT DISCONTINUE MUST REMAIN FOR ELECTROLYTE PROTOCOL       Nourishments    Room Service Eligibility Until Discontinued     Frequency: Until Discontinued     Number of Occurrences: Until Specified    Room Service Notify RN Until Discontinued     Frequency: Until Discontinued     Number of Occurrences: Until Specified   Respiratory Care    Incentive spriometry: RT to teach pt Once     Frequency: Once     Number of Occurrences: 1 Occurrences   Precaution    Hip precautions Continuous     Frequency: Continuous     Number of Occurrences: Until Specified   Diet    Regular/General diet Is Patient on Accuchecks? No     Frequency: Effective Now     Number of Occurrences: Until Specified   Nursing    Change  dressing to affected leg POD#3 or prior to Discharge     Frequency: Q Shift     Number of Occurrences: Until Specified    Early ambulation if no contraindications     Frequency: Continuous     Number of Occurrences: Until Specified    If patient on anticoagulants notify physician of:     Frequency: Until Discontinued     Number of Occurrences: Until Specified    Incentive spirometry     Frequency: 10X/PER HR AWAKE     Number of Occurrences: Until Specified    Initiate electrolyte protocol     Frequency: Until Discontinued     Number of Occurrences: Until Specified    Initiate Oxygen Protocol     Frequency: Until Discontinued     Number of Occurrences: Until Specified    Insert nazario catheter     Frequency: Once     Number of Occurrences: 1 Occurrences    Intake and Output     Frequency: Q Shift     Number of Occurrences: Until Specified    Intermittent Straight Cath Protocol for Acute Urinary Retention     Frequency: Until Discontinued     Number of Occurrences: Until Specified    Neurovascular checks     Frequency: Q4H     Number of Occurrences: Until Specified    Notify physician     Frequency: PRN     Number of Occurrences: Until Specified    Nurse Driven Indwelling Urinary Catheter Removal Protocol     Frequency: Until Discontinued     Number of Occurrences: Until Specified    Nurse Driven Indwelling Urinary Catheter Removal Protocol     Frequency: Until Discontinued     Number of Occurrences: Until Specified    Place sequential compression device     Frequency: Continuous     Number of Occurrences: Until Specified    Place sequential compression device     Frequency: Continuous     Number of Occurrences: Until Specified     Order Comments: Except while ambulating.      Place DAWOOD hose Knee high     Frequency: Continuous     Number of Occurrences: Until Specified    Pulse oximetry     Frequency: Spot Check     Number of Occurrences: Until Specified     Order Comments: With vital signs      Saline lock PIV when  tolerating PO intake     Frequency: Once     Number of Occurrences: 1 Occurrences    Up to chair Once     Frequency: Once     Number of Occurrences: 1 Occurrences     Order Comments: Tonight, POD#0      Up with assistance PRN     Frequency: PRN     Number of Occurrences: Until Specified    Vital signs     Frequency: Per Unit Routine     Number of Occurrences: 2 Hours     Order Comments: ED holding order only  Cancel if other admission orders exist!        Vital signs     Frequency: Per Unit Routine     Number of Occurrences: Until Specified     Order Comments: Every 4 hours for 24 hours, then unit routine      Weight bearing as tolerated     Frequency: Until Discontinued     Number of Occurrences: Until Specified    Weight bearing as tolerated     Frequency: Until Discontinued     Number of Occurrences: Until Specified   Medications    acetaminophen (Tylenol) tab 650 mg     Frequency: Q6H PRN     Dose: 650 mg     Route: Oral    amphetamine-dextroamphetamine (Adderall) tab 5 mg     Frequency: TID     Dose: 5 mg     Route: Oral    apixaban (Eliquis) tab 2.5 mg     Frequency: BID@0600,1800     Dose: 2.5 mg     Route: Oral    bisacodyl (Dulcolax) 10 MG rectal suppository 10 mg     Frequency: Daily PRN     Dose: 10 mg     Route: Rectal    diphenhydrAMINE (Benadryl) 50 mg/mL  injection 12.5 mg     Linked Order: Or     Frequency: Q4H PRN     Dose: 12.5 mg     Route: Intravenous    diphenhydrAMINE (Benadryl) cap/tab 25 mg     Linked Order: Or     Frequency: Q4H PRN     Dose: 25 mg     Route: Oral    docusate sodium (Colace) cap 100 mg     Frequency: BID     Dose: 100 mg     Route: Oral    fleet enema (Fleet) rectal enema 133 mL     Frequency: Once PRN     Dose: 1 enema     Route: Rectal    HYDROmorphone (Dilaudid) 1 MG/ML injection 0.2 mg     Linked Order: Or     Frequency: Q2H PRN     Dose: 0.2 mg     Route: Intravenous    HYDROmorphone (Dilaudid) 1 MG/ML injection 0.4 mg     Linked Order: Or     Frequency: Q2H PRN      Dose: 0.4 mg     Route: Intravenous    HYDROmorphone (Dilaudid) 1 MG/ML injection 0.8 mg     Linked Order: Or     Frequency: Q2H PRN     Dose: 0.8 mg     Route: Intravenous    LORazepam (Ativan) tab 0.5 mg     Frequency: Q6H PRN     Dose: 0.5 mg     Route: Oral    magnesium hydroxide (Milk of Magnesia) 400 MG/5ML oral suspension 30 mL     Frequency: Daily PRN     Dose: 30 mL     Route: Oral    meclizine (Antivert) tab 12.5 mg     Frequency: TID PRN     Dose: 12.5 mg     Route: Oral    metoclopramide (Reglan) 5 mg/mL injection 10 mg     Frequency: Q8H PRN     Dose: 10 mg     Route: Intravenous    ondansetron (Zofran) 4 MG/2ML injection 4 mg     Frequency: Q6H PRN     Dose: 4 mg     Route: Intravenous    ondansetron (Zofran) 4 MG/2ML injection 4 mg     Frequency: Q6H PRN     Dose: 4 mg     Route: Intravenous    oxyCODONE immediate release tab 2.5 mg     Linked Order: Or     Frequency: Q4H PRN     Dose: 2.5 mg     Route: Oral    oxyCODONE immediate release tab 5 mg     Linked Order: Or     Frequency: Q4H PRN     Dose: 5 mg     Route: Oral    pantoprazole (Protonix) DR tab 40 mg     Frequency: QAM AC     Dose: 40 mg     Route: Oral    polyethylene glycol (PEG 3350) (Miralax) 17 g oral packet 17 g     Frequency: Daily PRN     Dose: 17 g     Route: Oral    sennosides (Senokot) tab 17.2 mg     Frequency: Nightly     Dose: 17.2 mg     Route: Oral     Tanmay Min MD  2/16/2025

## 2025-02-20 PROBLEM — Z96.649 STATUS POST HIP HEMIARTHROPLASTY: Status: ACTIVE | Noted: 2025-02-20

## 2025-02-20 PROBLEM — Z74.09 IMPAIRED MOBILITY: Status: ACTIVE | Noted: 2025-02-20

## 2025-02-20 PROBLEM — Z87.39 HISTORY OF SPINAL STENOSIS: Status: ACTIVE | Noted: 2025-02-20

## 2025-02-20 PROBLEM — K21.9 GASTROESOPHAGEAL REFLUX DISEASE WITHOUT ESOPHAGITIS: Status: ACTIVE | Noted: 2025-02-20

## 2025-02-20 PROBLEM — Z98.1 HISTORY OF FUSION OF CERVICAL SPINE: Status: ACTIVE | Noted: 2025-02-20

## 2025-02-20 PROBLEM — R11.0 NAUSEA: Status: ACTIVE | Noted: 2025-02-20

## 2025-02-20 PROBLEM — F41.1 GAD (GENERALIZED ANXIETY DISORDER): Status: ACTIVE | Noted: 2025-02-20

## 2025-02-20 PROBLEM — R52 INADEQUATE PAIN CONTROL: Status: ACTIVE | Noted: 2025-02-20

## 2025-02-20 PROBLEM — K59.00 CONSTIPATION: Status: ACTIVE | Noted: 2025-02-20

## 2025-02-20 PROBLEM — R42 DIZZINESSES: Status: ACTIVE | Noted: 2025-02-20

## 2025-02-24 ENCOUNTER — SNF VISIT (OUTPATIENT)
Dept: INTERNAL MEDICINE CLINIC | Facility: SKILLED NURSING FACILITY | Age: 80
End: 2025-02-24

## 2025-02-24 VITALS
OXYGEN SATURATION: 96 % | RESPIRATION RATE: 18 BRPM | TEMPERATURE: 97 F | HEART RATE: 94 BPM | DIASTOLIC BLOOD PRESSURE: 62 MMHG | SYSTOLIC BLOOD PRESSURE: 115 MMHG

## 2025-02-24 DIAGNOSIS — K59.00 CONSTIPATION, UNSPECIFIED CONSTIPATION TYPE: ICD-10-CM

## 2025-02-24 DIAGNOSIS — Z98.1 HISTORY OF FUSION OF CERVICAL SPINE: ICD-10-CM

## 2025-02-24 DIAGNOSIS — Z96.649 STATUS POST HIP HEMIARTHROPLASTY: Primary | ICD-10-CM

## 2025-02-24 DIAGNOSIS — R53.1 WEAKNESS GENERALIZED: ICD-10-CM

## 2025-02-24 DIAGNOSIS — J96.01 ACUTE RESPIRATORY FAILURE WITH HYPOXIA (HCC): ICD-10-CM

## 2025-02-24 DIAGNOSIS — K21.9 GASTROESOPHAGEAL REFLUX DISEASE WITHOUT ESOPHAGITIS: ICD-10-CM

## 2025-02-24 DIAGNOSIS — F98.8 ATTENTION DEFICIT DISORDER (ADD) WITHOUT HYPERACTIVITY: ICD-10-CM

## 2025-02-24 DIAGNOSIS — S72.001A CLOSED DISPLACED FRACTURE OF RIGHT FEMORAL NECK (HCC): ICD-10-CM

## 2025-02-24 DIAGNOSIS — F41.1 GAD (GENERALIZED ANXIETY DISORDER): ICD-10-CM

## 2025-02-24 NOTE — PROGRESS NOTES
Narciso Burnett  : 1945  Age 79 year old  female patient is admitted to [facility] for rehabilitation and strengthening.       [facility] Admission 2/15/2025 - 25     Reason for visit: f/u on Closed displaced fracture of right femoral neck      HPI     Narciso Burnett is a(n) 79 year old female with pmh of chronic pain syndrome, TIA, cervical spinal stenosis status post cervical fusion, who came into [place] emergency room with complaining of pain in the right hip after she fell out of a rolling chair. Patient is not on any anticoagulants.    In ED she was found to have a right hip fracture and being admitted to the hospital.  Seen by orthopedics and underwent right hip hemiarthroplasty on 25 with Dr. Tyshawn Lazaro. No post op issues/concerns. Pt was stabilized and discharged to [facility] for rehab and straightening.     Pt seen and examined in follow up. Pt is sitting up in chair, appears comfortable in NAD. Pt reports feeling well and that she's participating and progressing in PT daily. Denies chest pain or sob. She's been eating well, but has been feeling constipated for the last couple of days. Denies n/v or abdominal pain. She has no other issues/concerns. Vss     ALLERGIES:  Allergies[1]    IMMUNIZATIONS  Immunization History   Administered Date(s) Administered    Covid-19 Vaccine Moderna 100 mcg/0.5 ml 2021, 2021    Covid-19 Vaccine Moderna 50 Mcg/0.25 Ml 12/10/2021    Covid-19 Vaccine Moderna Bivalent 50mcg/0.5mL 12+ years 2022    FLU VAC High Dose 65 YRS & Older PRSV Free (84749) 10/01/2019    Fluvirin, 3 Years & >, Im 2014, 2017    Influenza 2010, 2011, 2012, 2013, 2017    Moderna Covid-19 Vaccine 50mcg/0.5ml 12yrs+ 2023        CODE STATUS:  Full Code    ADVANCED CARE PLANNING TEAM: Will need family care plan    CURRENT MEDICATIONS - reviewed and updated on [facility] EMR     SUBJECTIVE    Pt seen and examined in follow up. Pt is  sitting up in chair, appears comfortable in NAD. Pt reports feeling well and that she's participating and progressing in PT daily. Denies chest pain or sob. She's been eating well, but has been feeling constipated for the last couple of days. Denies n/v or abdominal pain. She has no other issues/concerns. Vss     PHYSICAL EXAM:  Vitals:    02/24/25 1142   BP: 115/62   Pulse: 94   Resp: 18   Temp: 97 °F (36.1 °C)   SpO2: 96%      GENERAL HEALTH:well developed, well nourished, in no apparent distress   LINES, TUBES, DRAINS:  none  SKIN: no rashes, no suspicious lesions  WOUND: see wound assessment,   EYES: PERRLA, EOMI, sclera anicteric, conjunctiva normal; there is no nystagmus, no drainage from eyes  HENT: normocephalic; normal nose, no nasal drainage, mucous membranes pink, moist, pharynx no exudate, no visible cerumen.   NECK:supple, FROM; no JVD, no TMG, no carotid bruits   BREAST: deferred exam   RESPIRATORY:clear to percussion and auscultation  CARDIOVASCULAR: S1, S2 normal, RRR; no S3, no S4  ABDOMEN:  normal active BS+, soft, nondistended; no organomegaly, no masses; no bruits; nontender, no guarding, no rebound tenderness.  :no suprapubic distension  LYMPHATIC:no lymphedema  MUSCULOSKELETAL: limited mobility to the right leg   EXTREMITIES/VASCULAR:edema right LE  NEUROLOGIC:intact; no sensorimotor deficit  PSYCHIATRIC: alert and oriented x 3; affect appropriate . Forgetful at times     DIAGNOSTICS REVIEWED AT THIS VISIT:  Labs 02/21/25: wbc 9.24, hgb 10.5, plt 373, gluc 92, na 139, k 4.5, bun 15, creat 0.55    SEE PLAN BELOW    Closed displaced fracture of right femoral neck   Impaired mobility and ADLs/At risk for falling  Fall   - s/p right hip hemiarthroplasty on 02/16/25 with Dr. Tyshawn Lazaro  - continue oxycodone 5 mg q6h prn   - start tylenol 1000 mg TID   - ortho f/u as instructed   - continue eliquis 2.5 mg bid   - Fall Precautions  - PT/OT/ST to evaluate and treat  - [facility] team to establish care  plan meeting with patient and POA/family as appropriate  - Anticipate DC on or before TBD; SW to assist patient/family w/ DC planning  - DC Plan:  TBD     Constipation  Bowel regimen  - start senokot 2 tabs bid   - continue miralax daily prn, will schedule daily   - Monitor BM status      HX of Spinal Stenosis   - s/p cervical fusion 04/17 with hardware by Dr. Luis Little   - stable   - moniotr      ADD  - continue Dextroamphetamine Sulfate 5 mg TID      LANG   - continue diazepam 5 mg q6h prn     Insomnia   - started on melatonin 5 mg hs prn      HX of Dizziness   Hx of Nausea   - continue meclizine 25 mg tid prn   - continue zofran 4 mg daily prn      GERD   - continue protonix 40 mg daily      Vitamins/supplements as r/t deficiencies  - [facility] RD to follow while in rehab; supplementation/diet as per [facility]  - May continue home supplements  - continue vit d 50,000 weekly      LABS  - CBC/CMP weekly while in [facility]    FOLLOW UP APPOINTMENTS  No future appointments.     35 minutes spent w/ patient and staff, including but not limited to/ reviewing present status, needs, abilities with disciplines, reviewing medical records, vital signs, labs, completing medication reconciliation and entering orders for continued care in [facility]     Note to patient: The 21st Century Cures Act makes medical notes like these available to patients in the interest of transparency. However, this is a medical document intended as peer to peer communication. It is written in medical language and may contain abbreviations or verbiage that are unfamiliar. It may appear blunt or direct. Medical documents are intended to carry relevant information, facts as evident, and the clinical opinion of the practitioner who signs the document.     Connie Goldberg, APRN   02/24/25         [1]   Allergies  Allergen Reactions    Aspirin ITCHING

## 2025-03-03 ENCOUNTER — SNF VISIT (OUTPATIENT)
Dept: INTERNAL MEDICINE CLINIC | Facility: SKILLED NURSING FACILITY | Age: 80
End: 2025-03-03

## 2025-03-03 DIAGNOSIS — E44.0 MODERATE PROTEIN-CALORIE MALNUTRITION (HCC): ICD-10-CM

## 2025-03-03 DIAGNOSIS — K59.00 CONSTIPATION, UNSPECIFIED CONSTIPATION TYPE: ICD-10-CM

## 2025-03-03 DIAGNOSIS — S72.001A CLOSED DISPLACED FRACTURE OF RIGHT FEMORAL NECK (HCC): Primary | ICD-10-CM

## 2025-03-03 DIAGNOSIS — R53.1 WEAKNESS GENERALIZED: ICD-10-CM

## 2025-03-03 DIAGNOSIS — J96.01 ACUTE RESPIRATORY FAILURE WITH HYPOXIA (HCC): ICD-10-CM

## 2025-03-03 DIAGNOSIS — K21.9 GASTROESOPHAGEAL REFLUX DISEASE WITHOUT ESOPHAGITIS: ICD-10-CM

## 2025-03-03 DIAGNOSIS — Z96.649 STATUS POST HIP HEMIARTHROPLASTY: ICD-10-CM

## 2025-03-03 DIAGNOSIS — R09.02 HYPOXEMIA: ICD-10-CM

## 2025-03-03 DIAGNOSIS — E87.1 HYPONATREMIA: ICD-10-CM

## 2025-03-03 DIAGNOSIS — F41.1 GAD (GENERALIZED ANXIETY DISORDER): ICD-10-CM

## 2025-03-03 PROCEDURE — 99309 SBSQ NF CARE MODERATE MDM 30: CPT

## 2025-03-04 VITALS
OXYGEN SATURATION: 94 % | SYSTOLIC BLOOD PRESSURE: 138 MMHG | DIASTOLIC BLOOD PRESSURE: 78 MMHG | RESPIRATION RATE: 18 BRPM | HEART RATE: 87 BPM | TEMPERATURE: 97 F

## 2025-03-04 NOTE — PROGRESS NOTES
Narciso Burnett  : 1945  Age 79 year old  female patient is admitted to [facility] for rehabilitation and strengthening.       [facility] Admission 2/15/2025 - 25     Reason for visit: f/u on Closed displaced fracture of right femoral neck      HPI     Narciso Burnett is a(n) 79 year old female with pmh of chronic pain syndrome, TIA, cervical spinal stenosis status post cervical fusion, who came into [place] emergency room with complaining of pain in the right hip after she fell out of a rolling chair. Patient is not on any anticoagulants.    In ED she was found to have a right hip fracture and being admitted to the hospital.  Seen by orthopedics and underwent right hip hemiarthroplasty on 25 with Dr. Tyshawn Lazaro. No post op issues/concerns. Pt was stabilized and discharged to [facility] for rehab and straightening.    Pt seen and examined in follow up. Pt is sitting up in chair, accompanied by her , appears comfortable in NAD. Pt reports feeling well, denies having pain. States she's been participating and progressing in PT daily. Denies chest pain or sob. Pt states she's been eating well, but has been constipated and believes it's been about 5 days since she had a BM. Denies urinary retention. Pt has no other issues/concerns. Vss     ALLERGIES:  Allergies[1]    IMMUNIZATIONS  Immunization History   Administered Date(s) Administered    Covid-19 Vaccine Moderna 100 mcg/0.5 ml 2021, 2021    Covid-19 Vaccine Moderna 50 Mcg/0.25 Ml 12/10/2021    Covid-19 Vaccine Moderna Bivalent 50mcg/0.5mL 12+ years 2022    FLU VAC High Dose 65 YRS & Older PRSV Free (90758) 10/01/2019    Fluvirin, 3 Years & >, Im 2014, 2017    Influenza 2010, 2011, 2012, 2013, 2017    Moderna Covid-19 Vaccine 50mcg/0.5ml 12yrs+ 2023        CODE STATUS:  Full Code    ADVANCED CARE PLANNING TEAM: Will need family care plan    CURRENT MEDICATIONS - reviewed and updated  on SNF EMR     SUBJECTIVE    Pt seen and examined in follow up. Pt is sitting up in chair, accompanied by her , appears comfortable in NAD. Pt reports feeling well, denies having pain. States she's been participating and progressing in PT daily. Denies chest pain or sob. Pt states she's been eating well, but has been constipated and believes it's been about 5 days since she had a BM. Denies urinary retention. Pt has no other issues/concerns. Vss     PHYSICAL EXAM:  Vitals:    03/04/25 1450   BP: 138/78   Pulse: 87   Resp: 18   Temp: 97.1 °F (36.2 °C)   SpO2: 94%      GENERAL HEALTH:well developed, well nourished, in no apparent distress   LINES, TUBES, DRAINS:  none  SKIN: no rashes, no suspicious lesions  WOUND: see wound assessment,   EYES: PERRLA, EOMI, sclera anicteric, conjunctiva normal; there is no nystagmus, no drainage from eyes  HENT: normocephalic; normal nose, no nasal drainage, mucous membranes pink, moist, pharynx no exudate, no visible cerumen.   NECK:supple, FROM; no JVD, no TMG, no carotid bruits   BREAST: deferred exam   RESPIRATORY:clear to percussion and auscultation  CARDIOVASCULAR: S1, S2 normal, RRR; no S3, no S4  ABDOMEN:  normal active BS+, soft, nondistended; no organomegaly, no masses; no bruits; nontender, no guarding, no rebound tenderness.  :no suprapubic distension  LYMPHATIC:no lymphedema  MUSCULOSKELETAL: limited mobility to the right leg   EXTREMITIES/VASCULAR:edema right LE  NEUROLOGIC:intact; no sensorimotor deficit  PSYCHIATRIC: alert and oriented x 3; affect appropriate . Forgetful at times     DIAGNOSTICS REVIEWED AT THIS VISIT:    Labs 02/21/25: wbc 9.24, hgb 10.5, plt 373, gluc 92, na 139, k 4.5, bun 15, creat 0.55     SEE PLAN BELOW    Closed displaced fracture of right femoral neck   Impaired mobility and ADLs/At risk for falling  Fall   - s/p right hip hemiarthroplasty on 02/16/25 with Dr. Tyshawn Lazaro  - continue oxycodone 5 mg q6h prn   - start tylenol 1000 mg  TID   - ortho f/u as instructed   - continue eliquis 2.5 mg bid   - Fall Precautions  - PT/OT/ST to evaluate and treat  - [facility] team to establish care plan meeting with patient and POA/family as appropriate  - Anticipate DC on or before 03/10/25; SW to assist patient/family w/ DC planning  - DC Plan: 03/10/25 home with hh      Constipation  Bowel regimen  - start senokot 2 tabs bid   - continue miralax daily prn, will schedule daily   - Monitor BM status      HX of Spinal Stenosis   - s/p cervical fusion 04/17 with hardware by Dr. Luis Little   - stable   - moniotr      ADD  - continue Dextroamphetamine Sulfate 5 mg TID      LANG   - continue diazepam 5 mg q6h prn      Insomnia   - started on melatonin 5 mg hs prn      HX of Dizziness   Hx of Nausea   - continue meclizine 25 mg tid prn   - continue zofran 4 mg daily prn      GERD   - continue protonix 40 mg daily      Vitamins/supplements as r/t deficiencies  - [facility] RD to follow while in rehab; supplementation/diet as per [facility] RD  - May continue home supplements  - continue vit d 50,000 weekly      LABS  - CBC/CMP weekly while in [facility]    FOLLOW UP APPOINTMENTS  No future appointments.     35 minutes spent w/ patient and staff, including but not limited to/ reviewing present status, needs, abilities with disciplines, reviewing medical records, vital signs, labs, completing medication reconciliation and entering orders for continued care in [facility]     Note to patient: The 21st Century Cures Act makes medical notes like these available to patients in the interest of transparency. However, this is a medical document intended as peer to peer communication. It is written in medical language and may contain abbreviations or verbiage that are unfamiliar. It may appear blunt or direct. Medical documents are intended to carry relevant information, facts as evident, and the clinical opinion of the practitioner who signs the document.     Connie Goldberg, APRN   03/03/25         [1]    Allergies  Allergen Reactions    Aspirin ITCHING

## (undated) DEVICE — PACK CDS TOTAL HIP

## (undated) DEVICE — SUT ETHBND XL 1 30IN NABSRB GRN 36MM CT-1 1/2 CIR TAPR

## (undated) DEVICE — INTENT TO BE USED WITH SUTURE MATERIAL FOR TISSUE CLOSURE: Brand: RICHARD-ALLAN® NEEDLE 3/8 CIRCLE TROCAR

## (undated) DEVICE — SUT ETHBND XL 0 30IN CT-1 NABSRB GRN 36MM 1/2

## (undated) DEVICE — CEMENT MIXING SYSTEM WITH FEMORAL BREAKWAY NOZZLE: Brand: REVOLUTION

## (undated) DEVICE — SUCTION CANISTER, 3000CC,SAFELINER: Brand: DEROYAL

## (undated) DEVICE — GLOVE SUR 7 SENSICARE PI PIP GRN PWD F

## (undated) DEVICE — KIT EVAC 400CC 3/16IN PVC RND DRN Y CONN

## (undated) DEVICE — 3M(TM) MEDIPORE(TM) H SOFT CLOTH TAPE 2866: Brand: 3M™ MEDIPORE™

## (undated) DEVICE — SUT ETHBND XL 2 30IN V-37 NABSRB GRN 40MM 1/2

## (undated) DEVICE — ANTIBACTERIAL UNDYED BRAIDED (POLYGLACTIN 910), SYNTHETIC ABSORBABLE SUTURE: Brand: COATED VICRYL

## (undated) DEVICE — FORCEP RADIAL JAW 4

## (undated) DEVICE — SUT PDS II 2-0 27IN CT-1 ABSRB VLT L36MM 1/2

## (undated) DEVICE — BLADE ELECTRODE: Brand: EDGE

## (undated) DEVICE — SOLUTION IRRIG 1000ML 0.9% NACL USP BTL

## (undated) DEVICE — Device: Brand: DEFENDO AIR/WATER/SUCTION AND BIOPSY VALVE

## (undated) DEVICE — GLOVE SUR 7 SENSICARE PI PIP CRM PWD F

## (undated) DEVICE — SKIN PREP TRAY 4 COMPARTM TRAY: Brand: MEDLINE INDUSTRIES, INC.

## (undated) DEVICE — MEDI-VAC NON-CONDUCTIVE SUCTION TUBING: Brand: CARDINAL HEALTH

## (undated) DEVICE — SUT PDS II 0 36IN CT-1 ABSRB VLT L36MM 1/2

## (undated) DEVICE — ENCORE® LATEX MICRO SIZE 7, STERILE LATEX POWDER-FREE SURGICAL GLOVE: Brand: ENCORE

## (undated) DEVICE — FEMORAL CANAL BRUSH, IRRIGATION/SUCTION

## (undated) DEVICE — HANDLE SUR BLU PLAS LT FLX SLIP ON ST DISP

## (undated) DEVICE — DRESSING ADAPTIC L16XW3IN OIL ADH

## (undated) DEVICE — SUT PDS II 0 60IN TP-1 ABSRB VLT L65MM 1/2

## (undated) DEVICE — ENDOSCOPY PACK UPPER: Brand: MEDLINE INDUSTRIES, INC.

## (undated) NOTE — LETTER
Patient Name: Alexia Walker  YOB: 1945          MRN number:  [id number]  Date:  2/22/2020  Referring Physician:  Joshua Melchor*        LUMBAR SPINE EVALUATION:   Alexia Walker    9/25/1945  Referring Physician:  Alena Loomis Her previous PCP stopped prescribing it for her. She has seen 3 different pain physicians who have not helped her and pain continues to get worse.  She had a recommendation of revision of cervical spine by neurosurgery, but is not willing to go through consuelo factors/comorbidities, 3 body structures involved/activity limitations, and evolving symptoms including changing pain levels.      SUBJECTIVE:     Visit count 5/21/2020 1/8     Date 2/21/2020     Lumbar/L iliac crest      Pain Range 5-10/10     Pain Ave 8/1 Deviations: lateral deviations due to c/o vertigo       Devices: none       Assistance: none  Gillet Test: wnl  Standing Forward Bend Test: wnl    Abdominals 2/21/2020   Tone  Very poor     Diastasis in fingers 2/21/2020   6 cm above umbilicus 1   At BONES:             There is demineralization of the bony structures. There is moderate scoliosis. Mild osteoarthritic changes are seen in the spine and both shoulders. Postop changes in the cervical spine. OTHER:             Negative.        Dictated by ( on possible soreness after evaluation w use of modalities as needed (ice/heat), postural corrections and the importance of staying active.     Charges: PT Eval, TE, Self    Total Timed treatment: 35 min      Total Treatment Time: 60 min    Thank you for you

## (undated) NOTE — IP AVS SNAPSHOT
[address] [facility]  [address] [facility] [phone]                Discharge Summary   1/19/2017    Echo Elise           Admission Information        Provider Department    1/19/2017 Justin Mckeon MD [facility] 3w/Sw predniSONE 10 MG Tabs   Last time this was given:  60 mg on 1/21/2017  8:43 AM   Commonly known as:  Pepe Smith   What changed:    - medication strength  - how much to take  - when to take this  - additional instructions   Next dose due:   [facility] [id number] [phone]          Follow up with Chapis Vu DO In 2 weeks.     Specialty:  PULMONARY DISEASES    Contact information:    [facility] Jigar Kasey [facility]  [phone]        Immunization Histo 137 (01/21/17)  3.9 (01/21/17)  100      Pending Labs     Order Current Status    RAINBOW DRAW DARK GREEN Collected (01/19/17 1353)    BLOOD CULTURE Preliminary result    BLOOD CULTURE Preliminary result      Radiology Exams     None      Patient Belonging Support Staff. Remember, MyChart is NOT to be used for urgent needs.   For medical emergencies, dial 911.             _____________________________________________________________________________    Medication Side Effects - Medications to be taken at home itching           Steroids     predniSONE 10 MG Oral Tab         Use: To treat inflammation, to prevent or treat allergic reactions, chemotherapy related nausea, used as part of some chemo protocols   Most common side effects:  Increased blood sugar, high b

## (undated) NOTE — LETTER
Patient Name: Narciso Burnett  YOB: 1945          MRN number:  [id number]  Date:  4/16/2024  Referring Physician:  Nalini Hernandez-         Progress Summary  Pt has attended 9 visits in Physical Therapy.      Subjective: Pt has not been to therapy in about 2 months because of the cold weather. She has not been doing exercises at home, and feels like her lower extremities have been weaker. She feels like she can stand for less time, and walk for less time due to weakness. Her legs would feel shaky. When she would do the exercises, she would feel better. She would like to continue to work on her strengthening since it was making a difference. She would like to try a few more sessions of therapy. Her pain continues straight across her waist, her pain has is now lower. Denies incontinence and saddle like paraesthesia. Patient states she has been sitting more than usual.    Pain: Current: 5/10 with pain medication; Best: 5/10; Worst: 10/10       Objective:  See outpatient daily record    Strength:    Seated Knee Ext: 3/5 B  Seated Knee Flex: 3/5 B  Standing Hip Flex: 3-/5 B  Standing Hip Abd: 3/5 B  Standing Hip Ext: 3-/5 B with pain     Palpation: Tenderness and pain B lumbar paraspinals, QL L>R    Standing posture: Patient stands with forward flexed posture, B knees slightly flexed.     Gait: Patient ambulates with a forward flexed posture, decreased arm swing, decreased aislinn, decreased hip ext B    Assessment: Emphasized importance of strengthening to help patient be able to stand longer with less pain and feelings of fatigue. Pt verbalized understanding. Patient has regressed with standing and walking tolerance. Showing increased area of pain as well, but pain numbers have stayed around the same. Patient has been sitting more which could be contributing to increased pain. Pt hasn't had a follow up with referring provider, and will be making one. Pt continues to demonstrate weakness in B LE, and  would benefit from stretching and strengthening to help prevent injury at home and to decrease pain.         Goals:   (to be met in 10 visits)   Patient to be independent with HEP. - Not Met  Patient to be able to stand for 5 minutes while doing household activities - In progress  Patient to increase B hip strength = 4-/5 to allow patient to amb within the home without c/o pain for 5 minutes - Not Met   Patient to increase score on Oswestry by >9 points to show improvement in QOL  Patient to have an overall subjective improvement of 70% or greater. - In progress  Patient to be able to demonstrate and verbalize an upright posture - Met       Plan: Continue with STM/LE Strengthening. Issue additional HEP packets.  Date: 12/13/23  Tx#: 7/10 Date: 1/9/24  Tx#: 8/10 Date: 4/16/24  Tx#: 9/10   Manual:  STM Lower Back and QL B Manual:   STM Lower Back and QL B Manual:  STM Lower Back and QL B   TherEx:  Standing March x15  LAQ 2x10B  Heel raises x10 TherEx:  Hip Ext x10 B standing  Standing Hip abd x10  Standing March x10  Heel raises x10 TherEx:  Seated QL stretch L 10 sec hold x2  Seated heel toe raises x15  Seated LAQ x10 B  Seated march x10 B  Re-assessment completed               HEP: Diaphragmatic breathing  1/9/23: Standing Hip abd, ext, flex; Heel raises     Charges: 1 Manual x10min min, 2 TherEx 30xmin      Total Timed Treatment: 40 min  Total Treatment Time: 40 min  Post Oswestry Disability Index Score  Post Score: 36 % (11/14/2023  1:48 PM)    24 % improvement    Plan: Continue skilled Physical Therapy 2 x/week or a total of 8 visits over a 90 day period. Treatment will include: TherEx, TherAct, NeuroEd, Manual Prn, Gait training, balance training.        Patient/Family/Caregiver was advised of these findings, precautions, and treatment options and has agreed to actively participate in planning and for this course of care.    Thank you for your referral. If you have any questions, please contact me at Dept:  [phone].    Sincerely,  Electronically signed by therapist: Sravan Pascual PT     Physician's certification required:  Yes  Please co-sign or sign and return this letter via fax as soon as possible to [phone].   I certify the need for these services furnished under this plan of treatment and while under my care.    X___________________________________________________ Date____________________    Certification From: 4/16/2024  To:7/15/2024     21st Century Cures Act Notice to Patient: Medical documents like this are made available to patients in the interest of transparency. However, be advised this is a medical document and it is intended as qxjy-ap-jxta communication between your medical providers. This medical document may contain abbreviations, assessments, medical data, and results or other terms that are unfamiliar. Medical documents are intended to carry relevant information, facts as evident, and the clinical opinion of the practitioner. As such, this medical document may be written in language that appears blunt or direct. You are encouraged to contact your medical provider and/or [facility] Patient Experience if you have any questions about this medical document.

## (undated) NOTE — LETTER
[address], [place], [place]    Authorization for Surgical Operation and Procedure                               I hereby authorize Tyshawn Lazaro MD, my physician and his/her assistants (if applicable), which may include medical students, residents, and/or fellows, to perform the following surgical operation/ procedure and administer such anesthesia as may be determined necessary by my physician: Operation/Procedure name (s) RIGHT HIP HEMIARTHROPLASTY on Narciso Burnett   2.   I recognize that during the surgical operation/procedure, unforeseen conditions may necessitate additional or different procedures than those listed above.  I, therefore, further authorize and request that the above-named surgeon, assistants, or designees perform such procedures as are, in their judgment, necessary and desirable.    3.   My surgeon/physician has discussed prior to my surgery the potential benefits, risks and side effects of this procedure; the likelihood of achieving goals; and potential problems that might occur during recuperation.  They also discussed reasonable alternatives to the procedure, including risks, benefits, and side effects related to the alternatives and risks related to not receiving this procedure.  I have had all my questions answered and I acknowledge that no guarantee has been made as to the result that may be obtained.    4.   Should the need arise during my operation/procedure, which includes change of level of care prior to discharge, I also consent to the administration of blood and/or blood products.  Further, I understand that despite careful testing and screening of blood or blood products by collecting agencies, I may still be subject to ill effects as a result of receiving a blood transfusion and/or blood products.  The following are some, but not all, of the potential risks that can occur: fever and allergic reactions, hemolytic reactions, transmission of diseases  such as Hepatitis, AIDS and Cytomegalovirus (CMV) and fluid overload.  In the event that I wish to have an autologous transfusion of my own blood, or a directed donor transfusion, I will discuss this with my physician.  Check only if Refusing Blood or Blood Products  I understand refusal of blood or blood products as deemed necessary by my physician may have serious consequences to my condition to include possible death. I hereby assume responsibility for my refusal and release the hospital, its personnel, and my physicians from any responsibility for the consequences of my refusal.    o  Refuse   5.   I authorize the use of any specimen, organs, tissues, body parts or foreign objects that may be removed from my body during the operation/procedure for diagnosis, research or teaching purposes and their subsequent disposal by hospital authorities.  I also authorize the release of specimen test results and/or written reports to my treating physician on the hospital medical staff or other referring or consulting physicians involved in my care, at the discretion of the Pathologist or my treating physician.    6.   I consent to the photographing or videotaping of the operations or procedures to be performed, including appropriate portions of my body for medical, scientific, or educational purposes, provided my identity is not revealed by the pictures or by descriptive texts accompanying them.  If the procedure has been photographed/videotaped, the surgeon will obtain the original picture, image, videotape or CD.  The hospital will not be responsible for storage, release or maintenance of the picture, image, tape or CD.    7.   I consent to the presence of a  or observers in the operating room as deemed necessary by my physician or their designees.    8.   I recognize that in the event my procedure results in extended X-Ray/fluoroscopy time, I may develop a skin reaction.    9. If I have a Do Not Attempt  Resuscitation (DNAR) order in place, that status will be suspended while in the operating room, procedural suite, and during the recovery period unless otherwise explicitly stated by me (or a person authorized to consent on my behalf). The surgeon or my attending physician will determine when the applicable recovery period ends for purposes of reinstating the DNAR order.  10. Patients having a sterilization procedure: I understand that if the procedure is successful the results will be permanent and it will therefore be impossible for me to inseminate, conceive, or bear children.  I also understand that the procedure is intended to result in sterility, although the result has not been guaranteed.   11. I acknowledge that my physician has explained sedation/analgesia administration to me including the risk and benefits I consent to the administration of sedation/analgesia as may be necessary or desirable in the judgment of my physician.    I CERTIFY THAT I HAVE READ AND FULLY UNDERSTAND THE ABOVE CONSENT TO OPERATION and/or OTHER PROCEDURE.     ____________________________________  _________________________________        ______________________________  Signature of Patient    Signature of Responsible Person                Printed Name of Responsible Person                                      ____________________________________  _____________________________                ________________________________  Signature of Witness        Date  Time         Relationship to Patient    STATEMENT OF PHYSICIAN My signature below affirms that prior to the time of the procedure; I have explained to the patient and/or his/her legal representative, the risks and benefits involved in the proposed treatment and any reasonable alternative to the proposed treatment. I have also explained the risks and benefits involved in refusal of the proposed treatment and alternatives to the proposed treatment and have answered the patient's  questions. If I have a significant financial interest in a co-management agreement or a significant financial interest in any product or implant, or other significant relationship used in this procedure/surgery, I have disclosed this and had a discussion with my patient.     _____________________________________________________              _____________________________  (Signature of Physician)                                                                                         (Date)                                   (Time)  Patient Name: Narciso Burnett      : 1945      Printed: 2025     Medical Record #: [id number]                                      Page 1 of 1

## (undated) NOTE — LETTER
Patient Name: Anni Levine  YOB: 1945          MRN :  [id number]  Date:  11/14/2023  Referring Physician:  Michael Quick                 21st Century Cures Act Notice to Patient: Medical documents like this are made available to patients in the interest of transparency. However, be advised this is a medical document and it is intended as ikmk-bi-vebj communication between your medical providers. This medical document may contain abbreviations, assessments, medical data, and results or other terms that are unfamiliar. Medical documents are intended to carry relevant information, facts as evident, and the clinical opinion of the practitioner. As such, this medical document may be written in language that appears blunt or direct. You are encouraged to contact your medical provider and/or [facility] if you have any questions about this medical document.